# Patient Record
Sex: FEMALE | Race: WHITE | Employment: UNEMPLOYED | URBAN - METROPOLITAN AREA
[De-identification: names, ages, dates, MRNs, and addresses within clinical notes are randomized per-mention and may not be internally consistent; named-entity substitution may affect disease eponyms.]

---

## 2017-09-28 ENCOUNTER — HOSPITAL ENCOUNTER (OUTPATIENT)
Dept: MRI IMAGING | Age: 77
Discharge: HOME | End: 2017-09-28
Attending: ORTHOPAEDIC SURGERY
Payer: MEDICARE

## 2017-09-28 DIAGNOSIS — M41.50 DEGENERATIVE SCOLIOSIS IN ADULT PATIENT: ICD-10-CM

## 2017-09-28 DIAGNOSIS — M54.40 LOW BACK PAIN WITH SCIATICA: ICD-10-CM

## 2017-09-28 PROCEDURE — 72148 MRI LUMBAR SPINE W/O DYE: CPT

## 2017-11-20 NOTE — PERIOP NOTES
[facility]  Ambulatory Surgery Unit  Pre-operative Instructions    Procedure Date  11/21/17            Tentative Arrival Time 2:00pm      1. On the day of your procedure, please report to the Ambulatory Surgery Unit Registration Desk and sign in at your designated time. The Ambulatory Surgery Unit is located in [facility] on the [facility] side of the [facility] across from the [address] [place] [address]. Please have all of your health insurance cards and a photo ID. 2. You must have someone with you to drive you home as directed by your surgeon. 3. You may have a light breakfast and take normal morning medications. 4. We recommend you do not drink any alcoholic beverages for 24 hours before and after your procedure. 5. Contact your surgeons office for instructions on the following medications: non-steroidal anti-inflammatory drugs (i.e. Advil, Aleve), vitamins, and supplements. (Some surgeons will want you to stop these medications prior to surgery and others may allow you to take them)   **If you are currently taking Plavix, Coumadin, Aspirin and/or other blood-thinning agents, contact your surgeon for instructions. ** Your surgeon will partner with the physician prescribing these medications to determine if it is safe to stop or if you need to continue taking. Please do not stop taking these medications without instructions from your surgeon. 6. In an effort to help prevent surgical site infection, we ask that you shower with an anti-bacterial soap (i.e. Dial or Safeguard) on the morning of your procedure. Do not apply any lotions, powders, or deodorants after showering. 7. Wear comfortable clothes. Wear glasses instead of contacts. Do not bring any jewelry or money (other than copays or fees as instructed). Do not wear make-up, particularly mascara, the morning of your procedure. Wear your hair loose or down, no ponytails, buns, pati pins or clips.  All body piercings must be removed. 8. You should understand that if you do not follow these instructions your procedure may be cancelled. If your physical condition changes (i.e. fever, cold or flu) please contact your surgeon as soon as possible. 9. It is important that you be on time. If a situation occurs where you may be late, or if you have any questions or problems, please call [phone].    10. Your procedure time may be subject to change. You will receive a phone call the day prior to confirm your arrival time. I understand a pre-operative phone call will be made to verify my procedure time. In the event that I am not available, I give permission for a message to be left on my answering service and/or with another person?       yes         ___________________      ___________________      ___________________reviewed with patient and daughter, Gaby Tadeo per patient's request.  (Signature of Patient)          (Witness)                   (Date and Time)

## 2017-11-20 NOTE — PERIOP NOTES
Patient gave permission for me to review pre op instructions, directions, arrival time with her daughter, Angela Tiwari. I instructed Marko Giraldo to have patient bring list of medications day of procedure since patient very hard of hearing.

## 2017-11-21 ENCOUNTER — HOSPITAL ENCOUNTER (OUTPATIENT)
Age: 77
Setting detail: OUTPATIENT SURGERY
Discharge: HOME | End: 2017-11-21
Attending: PHYSICAL MEDICINE & REHABILITATION | Admitting: PHYSICAL MEDICINE & REHABILITATION
Payer: MEDICARE

## 2017-11-21 ENCOUNTER — APPOINTMENT (OUTPATIENT)
Dept: GENERAL RADIOLOGY | Age: 77
End: 2017-11-21
Attending: PHYSICAL MEDICINE & REHABILITATION
Payer: MEDICARE

## 2017-11-21 VITALS
RESPIRATION RATE: 16 BRPM | TEMPERATURE: 98.7 F | HEIGHT: 62 IN | WEIGHT: 158.38 LBS | BODY MASS INDEX: 29.15 KG/M2 | OXYGEN SATURATION: 96 % | SYSTOLIC BLOOD PRESSURE: 169 MMHG | HEART RATE: 53 BPM | DIASTOLIC BLOOD PRESSURE: 58 MMHG

## 2017-11-21 PROCEDURE — 74011000250 HC RX REV CODE- 250: Performed by: PHYSICAL MEDICINE & REHABILITATION

## 2017-11-21 PROCEDURE — 72020 X-RAY EXAM OF SPINE 1 VIEW: CPT

## 2017-11-21 PROCEDURE — 76210000046 HC AMBSU PH II REC FIRST 0.5 HR: Performed by: PHYSICAL MEDICINE & REHABILITATION

## 2017-11-21 PROCEDURE — 76030000002 HC AMB SURG OR TIME FIRST 0.: Performed by: PHYSICAL MEDICINE & REHABILITATION

## 2017-11-21 PROCEDURE — 74011250636 HC RX REV CODE- 250/636: Performed by: PHYSICAL MEDICINE & REHABILITATION

## 2017-11-21 PROCEDURE — 77030003665 HC NDL SPN BBMI -A: Performed by: PHYSICAL MEDICINE & REHABILITATION

## 2017-11-21 PROCEDURE — 76000 FLUOROSCOPY <1 HR PHYS/QHP: CPT

## 2017-11-21 RX ORDER — METHYLPREDNISOLONE ACETATE 40 MG/ML
40 INJECTION, SUSPENSION INTRA-ARTICULAR; INTRALESIONAL; INTRAMUSCULAR; SOFT TISSUE ONCE
Status: COMPLETED | OUTPATIENT
Start: 2017-11-21 | End: 2017-11-21

## 2017-11-21 RX ORDER — LIDOCAINE HYDROCHLORIDE 20 MG/ML
5 INJECTION, SOLUTION INFILTRATION; PERINEURAL ONCE
Status: COMPLETED | OUTPATIENT
Start: 2017-11-21 | End: 2017-11-21

## 2017-11-21 RX ORDER — MECLIZINE HCL 12.5 MG 12.5 MG/1
6.25 TABLET ORAL
COMMUNITY

## 2017-11-21 RX ORDER — BUPIVACAINE HYDROCHLORIDE 5 MG/ML
5 INJECTION, SOLUTION EPIDURAL; INTRACAUDAL ONCE
Status: COMPLETED | OUTPATIENT
Start: 2017-11-21 | End: 2017-11-21

## 2017-11-21 RX ORDER — ASCORBIC ACID 500 MG
500 TABLET ORAL DAILY
COMMUNITY

## 2017-11-21 RX ORDER — CARVEDILOL 12.5 MG/1
12.5 TABLET ORAL 2 TIMES DAILY WITH MEALS
COMMUNITY
End: 2020-08-13 | Stop reason: DRUGHIGH

## 2017-11-21 RX ORDER — AMLODIPINE BESYLATE 2.5 MG/1
2.5 TABLET ORAL DAILY
Status: ON HOLD | COMMUNITY
End: 2020-10-21 | Stop reason: SDUPTHER

## 2017-11-21 NOTE — OP NOTES
Facet Steroid Injection Operative Report    Indications: This is a 76 y.o. female who presents with LBP. She was positive for LS DJD. The patient was admitted for surgery as conservative measures have failed. Date of Surgery: 11/21/2017    Preoperative Diagnosis: LS DJD    Postoperative Diagnosis: LS DJD    Surgeon(s) and Role:     * Vianney Morrow MD - Primary     Procedure:  Procedure(s):  RIGHT L4-5 AND L5-S1 FACET INJECTION    Procedure in Detail:  After appropriate informed consent was obtained, the patient was taken to the operating suite and placed in the prone position on the operating table on appropriate padding. The LS region was prepped and draped in the usual sterile fashion. Intraoperative fluoroscopy was used to localize the LS spine. The skin was infiltrated with 2% lidocaine. An 22-g needle was advanced into the Right L4-5 and L5-S1 facets under fluoroscopic guidance. Next, 1ml of 0.5% marcaine and 40mg of Depo-Medrol were injected. The needle was removed from the patient. The patient was then turned back into the supine position on the stretcher and was taken to the Recovery Room in stable condition.     Estimated Blood Loss:  none     Specimens: None       Drains: None          Complications:  None    Signed By: Vianney Morrow MD                        November 21, 2017

## 2017-11-21 NOTE — PERIOP NOTES
Skin assessment:   WNL       Neuro:  Push/Pull assessment:     LUE Response: equal weak   LLE Response: equal weak   RUE Response: equal weak   RLE Response: equal weak    OB/Gyn assessment:    LMP: post menopause

## 2017-11-21 NOTE — IP AVS SNAPSHOT
[address] [facility] 
[phone] Patient: Kasey Reynoso MRN: [id number] :1940 About your hospitalization You were admitted on:  2017 You last received care in the:  [facility] [place] [facility] You were discharged on:  2017 Why you were hospitalized Your primary diagnosis was:  Not on File Discharge Orders None A check collin indicates which time of day the medication should be taken. My Medications ASK your physician about these medications Instructions Each Dose to Equal  
 Morning Noon Evening Bedtime AMARYL 2 mg tablet Generic drug:  glimepiride Your last dose was: Your next dose is: Take 2 mg by mouth every morning. 2 mg  
    
   
   
   
  
 citalopram 20 mg tablet Commonly known as:  Petar Clements Your last dose was: Your next dose is: Take 20 mg by mouth every evening. 20 mg  
    
   
   
   
  
 fenofibrate nanocrystallized 145 mg tablet Commonly known as:  [employer] Your last dose was: Your next dose is: Take 145 mg by mouth nightly. 145 mg  
    
   
   
   
  
 ferrous sulfate 325 mg (65 mg iron) EC tablet Commonly known as:  IRON Your last dose was: Your next dose is: Take 325 mg by mouth Daily (before breakfast). 325 mg FLEXERIL 5 mg tablet Generic drug:  cyclobenzaprine Your last dose was: Your next dose is: Take 5 mg by mouth three (3) times daily as needed for Muscle Spasm(s). 5 mg  
    
   
   
   
  
 lisinopril 20 mg tablet Commonly known as:  May Burgess Your last dose was: Your next dose is: Take 20 mg by mouth daily. 20 mg  
    
   
   
   
  
 pentoxifylline  mg CR tablet Commonly known as:  TRENTAL Your last dose was: Your next dose is: Take 400 mg by mouth daily. 400 mg  
    
   
   
   
  
 promethazine 6.25 mg/5 mL syrup Commonly known as:  PHENERGAN Your last dose was: Your next dose is: Take 6.25 mg by mouth four (4) times daily as needed for Nausea. 6.25 mg  
    
   
   
   
  
 ROBITUSSIN COUGH & COLD CF PO Your last dose was: Your next dose is: Take  by mouth. simvastatin 20 mg tablet Commonly known as:  ZOCOR Your last dose was: Your next dose is: Take 20 mg by mouth nightly. 20 mg  
    
   
   
   
  
 timolol maleate 0.5 % Drpd ophthalmic solution Your last dose was: Your next dose is:    
   
   
 Administer 1 Drop to both eyes two (2) times a day. 1 Drop  
    
   
   
   
  
 traMADol 50 mg tablet Commonly known as:  ULTRAM  
   
Your last dose was: Your next dose is: Take 50 mg by mouth every six (6) hours as needed for Pain. 50 mg  
    
   
   
   
  
 traZODone 50 mg tablet Commonly known as:  Ant Lipps Your last dose was: Your next dose is: Take 1 Tab by mouth nightly. 50 mg Discharge Instructions Discharge Instructions Lumbar Facet Block/Medial Branch Block You had a lumbar facet injection today. You will probably have some numbness in your lower back area for the next 6-8 hours. The steroids will slowly become effective, reducing your pain, over the next 2 weeks. You should begin feeling better after a few days, but it may take up to 2 weeks to notice the difference. The benefit you get from your injection will last a variable amount of time, depending on the severity of your spine problem.    
If the results you experience are significant, but not lasting a long time, you may be a candidate for a procedure that can be longer lasting (radiofrequency ablation of the nerves innervating the facet joints). ? Pain:  Most people do not have any increase in pain after this injection. However, you might experience some soreness at the site of the injection. If this happens, putting an icepack over the sore area will help. ? Bandage: You have a small bandage covering the site of the injection. You may remove it when you get home. ? Restrictions:  Someone should drive you home after the injection. After that, you have no restrictions. You may resume your normal level of activity. You may take a shower or bath, and you may eat normally. You should continue your current exercised and/or therapy routine. ? Medications:  Continue your current medications as prescribed. If your pain decreases, you may reduce the amount of your pain medicines. If you stopped taking anticoagulants or blood-thinners before the injection, start them tomorrow. If you have diabetes, your blood sugar may be elevated for a few days. Call your primary doctor with any questions. Call Dr. Pancho Castro at [phone] if you experience: ? Fever (101 degrees Fahrenheit or greater) ? Nausea or vomiting 
? Headache unrelieved by your normal pain medicine ? Redness or swelling at the injection site that lasts more than 1 day ? New numbness, tingling, weakness, or pain that you didnt have before the injection If still having pain in 1-2 weeks, call office at [phone] for a follow up appointment. DISCHARGE SUMMARY from Nurse The following personal items collected during your admission are returned to you:  
Dental Appliance:   
Vision:   
Hearing Aid:   
Jewelry:   
Clothing:   
Other Valuables:   
Valuables sent to safe: If you were given prescriptions, please review the written information on prescribed medications.   
 
· You will receive a Post Operative Call from one of the Recovery Room Nurses on the day after your surgery to check on you. It is very important for us to know how you are recovering after your surgery. · You may receive an e-mail or letter in the mail from [facility] regarding your experience with us in the Ambulatory Surgery Unit. Your feedback is valuable to us and we appreciate your participation in the survey. If you have not had your influenza or pneumococcal vaccines, please follow up with your primary care physician. The discharge information has been reviewed with the patient. The patient verbalized understanding. Introducing [facility]! Tawny Fuentes introduces Suso patient portal. Now you can access parts of your medical record, email your doctor's office, and request medication refills online. 1. In your internet browser, go to [email or web address] 2. Click on the First Time User? Click Here link in the Sign In box. You will see the New Member Sign Up page. 3. Enter your Suso Access Code exactly as it appears below. You will not need to use this code after youve completed the sign-up process. If you do not sign up before the expiration date, you must request a new code. · Suso Access Code: [email or web address] Expires: 12/27/2017 11:58 AM 
 
4. Enter the last four digits of your Social Security Number (xxxx) and Date of Birth (mm/dd/yyyy) as indicated and click Submit. You will be taken to the next sign-up page. 5. Create a Suso ID. This will be your Suso login ID and cannot be changed, so think of one that is secure and easy to remember. 6. Create a Suso password. You can change your password at any time. 7. Enter your Password Reset Question and Answer. This can be used at a later time if you forget your password. 8. Enter your e-mail address. You will receive e-mail notification when new information is available in [address] 9. Click Sign Up. You can now view and download portions of your medical record. 10. Click the Download Summary menu link to download a portable copy of your medical information. If you have questions, please visit the Frequently Asked Questions section of the [employer]Tweet website. Remember, [employer] is NOT to be used for urgent needs. For medical emergencies, dial 911. Now available from your iPhone and Android! Providers Seen During Your Hospitalization Provider Specialty Primary office phone Allyson Bermgan MD Physical Medicine and Rehab [phone] Your Primary Care Physician (PCP) Primary Care Physician Office Phone Office Fax Vitaly Trimble [address] [phone] You are allergic to the following Allergen Reactions Aspirin Hives Codeine Hives And N&V Recent Documentation Height Weight BMI OB Status Smoking Status 1.575 m 71.8 kg 28.97 kg/m2 Postmenopausal Never Smoker Emergency Contacts Name Discharge Info Relation Home Work Mobile Mayte Sheehan DISCHARGE CAREGIVER [3] Other Relative [6] [phone] Danni Camargo DISCHARGE CAREGIVER [3] Child [2]   [phone] Patient Belongings The following personal items are in your possession at time of discharge: 
                             
 
  
  
 Please provide this summary of care documentation to your next provider. Signatures-by signing, you are acknowledging that this After Visit Summary has been reviewed with you and you have received a copy. Patient Signature:  ____________________________________________________________ Date:  ____________________________________________________________  
  
Rina Alex Provider Signature:  ____________________________________________________________ Date:  ____________________________________________________________

## 2017-11-21 NOTE — H&P
Procedural Case Note    11/21/2017    (3:31 PM)    Mateo Kraft    1940   (76 y.o.)    [id number]    CC:  pain    ROS:   Complete ROS obtained, no CP, no SOB, no N or V    PMH:     Past Medical History:   Diagnosis Date    Chronic pain     hip, back    Diabetes ([address]     GERD (gastroesophageal reflux disease)     Hemorrhagic gastritis     10/2013    [place] (hard of hearing[place]     no hearing aides    Hyperlipemia     Hypertension     Psychiatric disorder     Depression    Stroke [facility]) 1990's    No residual says patient       ALLERGIES:     Allergies   Allergen Reactions    Aspirin Hives    Codeine Hives     And N&V       MEDS:     Current Facility-Administered Medications   Medication Dose Route Frequency    bupivacaine (PF) (MARCAINE) 0.5 % (5 mg/mL) injection 25 mg  5 mL Epidural ONCE    methylPREDNISolone acetate (DEPO-MEDROL) 40 mg/mL injection 40 mg  40 mg IntraMUSCular ONCE    lidocaine (XYLOCAINE) 20 mg/mL (2 %) injection 100 mg  5 mL IntraDERMal ONCE    iohexol (OMNIPAQUE) 180 mg iodine/mL injection 10 mL  10 mL Intra artICUlar ONCE    sodium bicarbonate (4%) (NEUT) injection 1 mL  1 mL SubCUTAneous ONCE          Visit Vitals    /53 (BP 1 Location: Right arm, BP Patient Position: At rest)    Pulse (!) 58    Temp 98.3 °F (36.8 °C)    Resp 20    Ht 5' 2\" (1.575 m)    Wt 71.8 kg (158 lb 6 oz)    SpO2 96%    BMI 28.97 kg/m2     PE:  Gen: NAD  Head: normocephalic  Heart: RRR  Lungs: CTA mana  Abd: NT, ND, soft  Neuro: awake and alert  Skin: intact    IMPRESSION:   LS DJD    Note:  The clinical status was discussed in detail with the patient. The procedure was again discussed and described in detail. All understand and accept the planned procedure and risks; reject other forms of treatment. All questions are answered.     Dave Saab MD

## 2017-11-21 NOTE — PERIOP NOTES
Maninder Padgett  1940  [id number]    Situation:  Verbal report given from: NATALIE Grimes RN  Procedure: Procedure(s):  RIGHT L4-5 AND L5-S1 FACET INJECTION    Background:    Preoperative diagnosis: LUMBAR SPINE DEGENERATIVE JOINT DISEASE    Postoperative diagnosis: LUMBAR SPINE DEGENERATIVE JOINT DISEASE    :  Dr. Shandra White:  Intra-procedure medications, procedure, and allergies reviewed        Recommendation:    Discharge patient home after discharge instructions reviewed with patient. Rest until local has worn off.    1605 Pt. Alert. Denies pain or chill. Discharge instructions reviewed with caregiver and patient. Allowed and answered questions. Tolerating PO fluids. Both state ready for discharge.

## 2017-11-21 NOTE — DISCHARGE INSTRUCTIONS
Discharge Instructions    Lumbar Facet Block/Medial Branch Block    You had a lumbar facet injection today. You will probably have some numbness in your lower back area for the next 6-8 hours. The steroids will slowly become effective, reducing your pain, over the next 2 weeks. You should begin feeling better after a few days, but it may take up to 2 weeks to notice the difference. The benefit you get from your injection will last a variable amount of time, depending on the severity of your spine problem. If the results you experience are significant, but not lasting a long time, you may be a candidate for a procedure that can be longer lasting (radiofrequency ablation of the nerves innervating the facet joints).  Pain:  Most people do not have any increase in pain after this injection. However, you might experience some soreness at the site of the injection. If this happens, putting an icepack over the sore area will help.  Bandage: You have a small bandage covering the site of the injection. You may remove it when you get home.  Restrictions:  Someone should drive you home after the injection. After that, you have no restrictions. You may resume your normal level of activity. You may take a shower or bath, and you may eat normally. You should continue your current exercised and/or therapy routine.  Medications:  Continue your current medications as prescribed. If your pain decreases, you may reduce the amount of your pain medicines. If you stopped taking anticoagulants or blood-thinners before the injection, start them tomorrow. If you have diabetes, your blood sugar may be elevated for a few days. Call your primary doctor with any questions.     Call Dr. Meño Simon at [phone] if you experience:   Fever (101 degrees Fahrenheit or greater)   Nausea or vomiting   Headache unrelieved by your normal pain medicine   Redness or swelling at the injection site that lasts more than 1 day   New numbness, tingling, weakness, or pain that you didnt have before the injection     If still having pain in 1-2 weeks, call office at [phone] for a follow up appointment. DISCHARGE SUMMARY from Nurse    The following personal items collected during your admission are returned to you:   Dental Appliance:    Vision:    Hearing Aid:    Jewelry:    Clothing:    Other Valuables:    Valuables sent to safe: If you were given prescriptions, please review the written information on prescribed medications. · You will receive a Post Operative Call from one of the Recovery Room Nurses on the day after your surgery to check on you. It is very important for us to know how you are recovering after your surgery. · You may receive an e-mail or letter in the mail from [place] regarding your experience with us in the Ambulatory Surgery Unit. Your feedback is valuable to us and we appreciate your participation in the survey. If you have not had your influenza or pneumococcal vaccines, please follow up with your primary care physician. The discharge information has been reviewed with the patient. The patient verbalized understanding.

## 2018-05-09 NOTE — PERIOP NOTES
[facility]  Ambulatory Surgery Unit  Pre-operative Instructions    Procedure Date  5/15/18            Tentative Arrival Time 1:30pm      1. On the day of your procedure, please report to the Ambulatory Surgery Unit Registration Desk and sign in at your designated time. The Ambulatory Surgery Unit is located in [facility] ER on the [facility] side of the [facility] across from the [address] [place] [address]. Please have all of your health insurance cards and a photo ID. 2. You must have someone with you to drive you home as directed by your surgeon. 3. You may have a light breakfast and take normal morning medications. 4. We recommend you do not drink any alcoholic beverages for 24 hours before and after your procedure. 5. Contact your surgeons office for instructions on the following medications: non-steroidal anti-inflammatory drugs (i.e. Advil, Aleve), vitamins, and supplements. (Some surgeons will want you to stop these medications prior to surgery and others may allow you to take them)   **If you are currently taking Plavix, Coumadin, Aspirin and/or other blood-thinning agents, contact your surgeon for instructions. ** Your surgeon will partner with the physician prescribing these medications to determine if it is safe to stop or if you need to continue taking. Please do not stop taking these medications without instructions from your surgeon. 6. In an effort to help prevent surgical site infection, we ask that you shower with an anti-bacterial soap (i.e. Dial or Safeguard) on the morning of your procedure. Do not apply any lotions, powders, or deodorants after showering. 7. Wear comfortable clothes. Wear glasses instead of contacts. Do not bring any jewelry or money (other than copays or fees as instructed). Do not wear make-up, particularly mascara, the morning of your procedure. Wear your hair loose or down, no ponytails, buns, pati pins or clips.  All body piercings must be removed. 8. You should understand that if you do not follow these instructions your procedure may be cancelled. If your physical condition changes (i.e. fever, cold or flu) please contact your surgeon as soon as possible. 9. It is important that you be on time. If a situation occurs where you may be late, or if you have any questions or problems, please call [phone].    10. Your procedure time may be subject to change. You will receive a phone call the day prior to confirm your arrival time. I understand a pre-operative phone call will be made to verify my procedure time. In the event that I am not available, I give permission for a message to be left on my answering service and/or with another person?       yes         ___________________      ___________________      ___________________reviewed with patient's daughter, Cyndee Driver per patient's request.  (Signature of Patient)          (Witness)                   (Date and Time)

## 2018-05-09 NOTE — PERIOP NOTES
Patient is hard of hearing (no hearing aids) and requested daughter, Radha Faust, review her health history since patient could not hear questions I asked.

## 2018-05-15 ENCOUNTER — APPOINTMENT (OUTPATIENT)
Dept: GENERAL RADIOLOGY | Age: 78
End: 2018-05-15
Attending: PHYSICAL MEDICINE & REHABILITATION
Payer: MEDICARE

## 2018-05-15 ENCOUNTER — HOSPITAL ENCOUNTER (OUTPATIENT)
Age: 78
Setting detail: OUTPATIENT SURGERY
Discharge: HOME | End: 2018-05-15
Attending: PHYSICAL MEDICINE & REHABILITATION | Admitting: PHYSICAL MEDICINE & REHABILITATION
Payer: MEDICARE

## 2018-05-15 VITALS
OXYGEN SATURATION: 94 % | SYSTOLIC BLOOD PRESSURE: 174 MMHG | DIASTOLIC BLOOD PRESSURE: 96 MMHG | RESPIRATION RATE: 20 BRPM | HEART RATE: 57 BPM | HEIGHT: 62 IN | TEMPERATURE: 98.5 F | BODY MASS INDEX: 28.52 KG/M2 | WEIGHT: 155 LBS

## 2018-05-15 PROCEDURE — 74011250636 HC RX REV CODE- 250/636: Performed by: PHYSICAL MEDICINE & REHABILITATION

## 2018-05-15 PROCEDURE — 76030000017 HC AMB SURG FIRST 0.5 HR INTENSV-TIER 1: Performed by: PHYSICAL MEDICINE & REHABILITATION

## 2018-05-15 PROCEDURE — 72020 X-RAY EXAM OF SPINE 1 VIEW: CPT

## 2018-05-15 PROCEDURE — 76210000046 HC AMBSU PH II REC FIRST 0.5 HR: Performed by: PHYSICAL MEDICINE & REHABILITATION

## 2018-05-15 PROCEDURE — 77030003665 HC NDL SPN BBMI -A: Performed by: PHYSICAL MEDICINE & REHABILITATION

## 2018-05-15 PROCEDURE — 74011636320 HC RX REV CODE- 636/320: Performed by: PHYSICAL MEDICINE & REHABILITATION

## 2018-05-15 PROCEDURE — 76000 FLUOROSCOPY <1 HR PHYS/QHP: CPT

## 2018-05-15 PROCEDURE — 74011000250 HC RX REV CODE- 250: Performed by: PHYSICAL MEDICINE & REHABILITATION

## 2018-05-15 RX ORDER — BUPIVACAINE HYDROCHLORIDE 5 MG/ML
5 INJECTION, SOLUTION EPIDURAL; INTRACAUDAL ONCE
Status: DISCONTINUED | OUTPATIENT
Start: 2018-05-15 | End: 2018-05-15 | Stop reason: HOSPADM

## 2018-05-15 RX ORDER — METHYLPREDNISOLONE ACETATE 40 MG/ML
40 INJECTION, SUSPENSION INTRA-ARTICULAR; INTRALESIONAL; INTRAMUSCULAR; SOFT TISSUE ONCE
Status: COMPLETED | OUTPATIENT
Start: 2018-05-15 | End: 2018-05-15

## 2018-05-15 RX ORDER — LIDOCAINE HYDROCHLORIDE 20 MG/ML
10 INJECTION, SOLUTION INFILTRATION; PERINEURAL ONCE
Status: COMPLETED | OUTPATIENT
Start: 2018-05-15 | End: 2018-05-15

## 2018-05-15 NOTE — PERIOP NOTES
1620-Pt has strong and equal plantar and dorsi flexion. 1628-Pt able to stand and hold weight. No swelling or bleeding at injection sites. When pt assisted up to wheelchair, pt reports feeling shaky, assisted to wheelchair with 2 assist, pt states her legs don't feel like they did when she came in. Reinforced with pt and daughter pt is to walk with walker today and to be only up with assistance. Transported via w/c to awaiting transportation with minimal assistance. Pt was able to get in car on own.

## 2018-05-15 NOTE — PERIOP NOTES
Bre Renteria  1940  [id number]    Situation:  Verbal report given from: NATALIE Buchanan RN  Procedure: Procedure(s):  RIGHT L4 + LEFT L5 TRANSFORAMINAL EPIDURAL STEROID INJECTION    Background:    Preoperative diagnosis: LUMBAR DEGENERATIVE DISC DISEASE     Postoperative diagnosis: LUMBAR DEGENERATIVE [place] DISEASE     :  Dr. Oralia Lowe:  Intra-procedure medications, procedure, and allergies reviewed        Recommendation:    Discharge patient home after discharge instructions reviewed with patient. Rest until local has worn off.

## 2018-05-15 NOTE — PERIOP NOTES
Skin assessment:   WNL   Skin color: normal for ethnicity   Skin condition: normal for age   Skin integrity: good   Turgor: good    Neuro:  Push/Pull assessment:     LUE Response: moderate    LLE Response: strong push/moderate pull   RUE Response: moderate    RLE Response: strong push/moderate pull

## 2018-05-15 NOTE — OP NOTES
Epidural Steroid Injection Operative Report    Indications: This is a 76 y.o. female who presents with low back pain. She was positive for LS DDD. The patient was admitted for surgery as conservative measures have failed. Date of Surgery: 5/15/2018    Preoperative Diagnosis: LS DDD    Postoperative Diagnosis: LS DDD    Surgeon(s) and Role:     * Silvino Walter MD - Primary     Procedure:  Procedure(s):  RIGHT L4 + LEFT L5 TRANSFORAMINAL EPIDURAL STEROID INJECTION    Procedure in Detail:  After appropriate informed consent was obtained, the patient was taken to the operating suite and placed in the prone position on the operating table on appropriate padding. The LS region was prepped and draped in the usual sterile fashion. Intraoperative fluoroscopy was used to localize the LS spine. The skin was infiltrated with 2% lidocaine. An 22-g needle was advanced into the Right L4 and Left L5 neuroforamen under fluoroscopic guidance. A small amount of contrast was injected into the epidural space, confirming appropriate needle placement on fluoroscopy. Next, 2ml of 2% lidocaine and 80mg of Depo-Medrol were injected. The needle was removed from the patient. The patient was then turned back into the supine position on the stretcher and was taken to the Recovery Room in stable condition.     Estimated Blood Loss:  none     Specimens: None       Drains: None          Complications:  None    Signed By: Silvino Walter MD                        May 15, 2018

## 2018-05-15 NOTE — H&P
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 Visit Vitals    /74 (BP 1 Location: Right arm, BP Patient Position: At rest)    Pulse 62    Temp 98.3 °F (36.8 °C)    Resp 16    Ht 5' 2\" (1.575 m)    Wt 70.3 kg (155 lb)    SpO2 96%    BMI 28.35 kg/m2     PE:  Gen: NAD  Head: normocephalic  Heart: RRR  Lungs: CTA mana  Abd: NT, ND, soft  Neuro: awake and alert  Skin: intact    IMPRESSION:   LS DDD    Note:  The clinical status was discussed in detail with the patient. The procedure was again discussed and described in detail. All understand and accept the planned procedure and risks; reject other forms of treatment. All questions are answered.     Lizbet Walters MD

## 2018-05-15 NOTE — IP AVS SNAPSHOT
[address] [facility] 
[phone] Patient: Shandra Staples MRN: [id number] :1940 About your hospitalization You were admitted on:  May 15, 2018 You last received care in the:  [facility] You were discharged on:  May 15, 2018 Why you were hospitalized Your primary diagnosis was:  Not on File Follow-up Information Follow up With Details Comments Contact Info Dorisann Lundborg, MD   [address] [place] [address] 
[phone] Discharge Orders None A check collin indicates which time of day the medication should be taken. My Medications ASK your doctor about these medications Instructions Each Dose to Equal  
 Morning Noon Evening Bedtime  
 amLODIPine 2.5 mg tablet Commonly known as:  Juliocesar Albright Your last dose was: Your next dose is: Take 2.5 mg by mouth daily. 2.5 mg  
    
   
   
   
  
 carvedilol 12.5 mg tablet Commonly known as:  Jez Lucia Your last dose was: Your next dose is: Take 12.5 mg by mouth two (2) times daily (with meals). 12.5 mg  
    
   
   
   
  
 citalopram 20 mg tablet Commonly known as:  Ayan Hernandez Your last dose was: Your next dose is: Take 20 mg by mouth every evening. 20 mg  
    
   
   
   
  
 fenofibrate nanocrystallized 145 mg tablet Commonly known as:  [employer] Your last dose was: Your next dose is: Take 145 mg by mouth nightly. 145 mg  
    
   
   
   
  
 ferrous sulfate 325 mg (65 mg iron) EC tablet Commonly known as:  IRON Your last dose was: Your next dose is: Take 325 mg by mouth Daily (before breakfast). 325 mg  
    
   
   
   
  
 lisinopril 20 mg tablet Commonly known as:  Hebertnicolasa Zhou Your last dose was: Your next dose is: Take 20 mg by mouth two (2) times a day. 20 mg  
    
   
   
   
  
 meclizine 12.5 mg tablet Commonly known as:  ANTIVERT Your last dose was: Your next dose is: Take 6.25 mg by mouth three (3) times daily as needed (1/2 tab). 6.25 mg  
    
   
   
   
  
 pentoxifylline  mg CR tablet Commonly known as:  TRENTAL Your last dose was: Your next dose is: Take 400 mg by mouth two (2) times a day. 400 mg  
    
   
   
   
  
 simvastatin 20 mg tablet Commonly known as:  ZOCOR Your last dose was: Your next dose is: Take 20 mg by mouth nightly. 20 mg  
    
   
   
   
  
 traMADol 50 mg tablet Commonly known as:  ULTRAM  
   
Your last dose was: Your next dose is: Take 50 mg by mouth every eight (8) hours as needed for Pain. 50 mg  
    
   
   
   
  
 traZODone 50 mg tablet Commonly known as:  Donneta Strayhorn Your last dose was: Your next dose is: Take 1 Tab by mouth nightly. 50 mg  
    
   
   
   
  
 VITAMIN C 500 mg tablet Generic drug:  ascorbic acid (vitamin C) Your last dose was: Your next dose is: Take 500 mg by mouth daily. 500 mg Opioid Education Prescription Opioids: What You Need to Know: 
 
Prescription opioids can be used to help relieve moderate-to-severe pain and are often prescribed following a surgery or injury, or for certain health conditions. These medications can be an important part of treatment but also come with serious risks. Opioids are strong pain medicines. Examples include hydrocodone, oxycodone, fentanyl, and morphine. Heroin is an example of an illegal opioid. It is important to work with your health care provider to make sure you are getting the safest, most effective care. WHAT ARE THE RISKS AND SIDE EFFECTS OF OPIOID USE? Prescription opioids carry serious risks of addiction and overdose, especially with prolonged use. An opioid overdose, often marked by slow breathing, can cause sudden death. The use of prescription opioids can have a number of side effects as well, even when taken as directed. · Tolerance-meaning you might need to take more of a medication for the same pain relief · Physical dependence-meaning you have symptoms of withdrawal when the medication is stopped. Withdrawal symptoms can include nausea, sweating, chills, diarrhea, stomach cramps, and muscle aches. Withdrawal can last up to several weeks, depending on which drug you took and how long you took it. · Increased sensitivity to pain · Constipation · Nausea, vomiting, and dry mouth · Sleepiness and dizziness · Confusion · Depression · Low levels of testosterone that can result in lower sex drive, energy, and strength · Itching and sweating RISKS ARE GREATER WITH:      
· History of drug misuse, substance use disorder, or overdose · Mental health conditions (such as depression or anxiety) · Sleep apnea · Older age (72 years or older) · Pregnancy Avoid alcohol while taking prescription opioids. Also, unless specifically advised by your health care provider, medications to avoid include: · Benzodiazepines (such as Xanax or Valium) · Muscle relaxants (such as Soma or Flexeril) · Hypnotics (such as Ambien or Lunesta) · Other prescription opioids KNOW YOUR OPTIONS Talk to your health care provider about ways to manage your pain that don't involve prescription opioids. Some of these options may actually work better and have fewer risks and side effects. Options may include: 
· Pain relievers such as acetaminophen, ibuprofen, and naproxen · Some medications that are also used for depression or seizures · Physical therapy and exercise · Counseling to help patients learn how to cope better with triggers of pain and stress. · Application of heat or cold compress · Massage therapy · Relaxation techniques Be Informed Make sure you know the name of your medication, how much and how often to take it, and its potential risks & side effects. IF YOU ARE PRESCRIBED OPIOIDS FOR PAIN: 
· Never take opioids in greater amounts or more often than prescribed. Remember the goal is not to be pain-free but to manage your pain at a tolerable level. · Follow up with your primary care provider to: · Work together to create a plan on how to manage your pain. · Talk about ways to help manage your pain that don't involve prescription opioids. · Talk about any and all concerns and side effects. · Help prevent misuse and abuse. · Never sell or share prescription opioids · Help prevent misuse and abuse. · Store prescription opioids in a secure place and out of reach of others (this may include visitors, children, friends, and family). · Safely dispose of unused/unwanted prescription opioids: Find your community drug take-back program or your pharmacy mail-back program, or flush them down the toilet, following guidance from the Food and Drug Administration (www.fda.gov/Drugs/ResourcesForYou). · Visit www.cdc.gov/drugoverdose to learn about the risks of opioid abuse and overdose. · If you believe you may be struggling with addiction, tell your health care provider and ask for guidance or call [address] [place], [place] [address]stdibs at [phone]-WEPX. Discharge Instructions Dr. Cassie Vazquez Discharge Instructions Transforaminal Epidural Steroid Injection/ Selective Nerve Block You had a transforaminal epidural steroid injection/ selective nerve block today. You will probably have some numbness, and possibly weakness, in your leg for the next 6 to 8 hours. The steroids will slowly become effective, reducing your pain, over the next 2 weeks.  You should begin feeling better after a few days, but it may take up to 2 weeks to notice the difference. The benefit you get from your injection will last a variable amount of time, depending on the severity of your lumbar spine problem. ? Pain: Most people do not have any increase in pain after this injection. However, you might experience some soreness in your low back. If this happens, putting an ice pack over the sore area will help. ? Bandage: You will have a small bandage covering the site of the injection. You may remove it once you get home. ? Restrictions: Someone should drive you home after the injection. After that, you have no restrictions. You need to be careful while walking, as you may still have some numbness or weakness in your leg. You may resume your normal level of activity. You may take a shower or bath, and you may eat normally. You should continue your current exercises and/or therapy routine. ?  Medications: Continue your current medications as prescribed. If your pain decreases, you may reduce the amount of your pain medicines. If you stopped taking anticoagulants or blood-thinners before the injection, start them tomorrow. If you have diabetes, your blood sugar may be elevated for a few days. Call your primary doctor with any questions. Call Dr. Malorie Aleman at [phone] if you experience: ? Fever (101 degrees Fahrenheit or greater) ? Nausea or vomiting 
? Headache unrelieved by your normal pain medicine ? Redness or swelling at the injection site that lasts more than 1 day ? New numbness, tingling, weakness, or pain that you didnt have before the injection Follow-up appointment: If still having pain in 1-2 weeks, call office at [phone] for a follow up appointment. DISCHARGE SUMMARY from Nurse The following personal items collected during your admission are returned to you:  
Dental Appliance: Dental Appliances: Lowers, Uppers, With patient Vision:   
Hearing Aid:   
 Jewelry: Jewelry: None Clothing: Clothing: With patient Other Valuables: Other Valuables: Hima Hendricks, With patient Valuables sent to safe: If you were given prescriptions, please review the written information on prescribed medications. · You will receive a Post Operative Call from one of the Recovery Room Nurses on the day after your surgery to check on you. It is very important for us to know how you are recovering after your surgery. · You may receive an e-mail or letter in the mail from [employer] regarding your experience with us in the Ambulatory Surgery Unit. Your feedback is valuable to us and we appreciate your participation in the survey. If you have not had your influenza or pneumococcal vaccines, please follow up with your primary care physician. The discharge information has been reviewed with the patient. The patient verbalized understanding. Introducing [facility]! [employer] introduces [employer] patient portal. Now you can access parts of your medical record, email your doctor's office, and request medication refills online. 1. In your internet browser, go to [email or web address] [facility] 2. Click on the First Time User? Click Here link in the Sign In box. You will see the New Member Sign Up page. 3. Enter your [employer] Access Code exactly as it appears below. You will not need to use this code after youve completed the sign-up process. If you do not sign up before the expiration date, you must request a new code. · [employer] Access Code: [email or web address] Expires: 7/10/2018  1:13 PM 
 
4. Enter the last four digits of your Social Security Number (xxxx) and Date of Birth (mm/dd/yyyy) as indicated and click Submit. You will be taken to the next sign-up page. 5. Create a [employer] ID. This will be your [employer] login ID and cannot be changed, so think of one that is secure and easy to remember. 6. Create a [email or web address] password. You can change your password at any time. 7. Enter your Password Reset Question and Answer. This can be used at a later time if you forget your password. 8. Enter your e-mail address. You will receive e-mail notification when new information is available in [address] 9. Click Sign Up. You can now view and download portions of your medical record. 10. Click the Download Summary menu link to download a portable copy of your medical information. If you have questions, please visit the Frequently Asked Questions section of the [employer] website. Remember, [employer] is NOT to be used for urgent needs. For medical emergencies, dial 911. Now available from your iPhone and Android! Introducing Eric Elliott As a Akil Escobars patient, I wanted to make you aware of our electronic visit tool called Eric Elliott. [employer] allows you to connect within minutes with a medical provider 24 hours a day, seven days a week via a mobile device or tablet or logging into a secure website from your computer. You can access Eric Elliott from anywhere in the [place]. A virtual visit might be right for you when you have a simple condition and feel like you just dont want to get out of bed, or cant get away from work for an appointment, when your regular [facility] provider is not available (evenings, weekends or holidays), or when youre out of town and need minor care. Electronic visits cost only $49 and if the [facility] [employer] provider determines a prescription is needed to treat your condition, one can be electronically transmitted to a nearby pharmacy*. Please take a moment to enroll today if you have not already done so. The enrollment process is free and takes just a few minutes. To enroll, please download the [employer] chaparrita to your tablet or phone, or visit [email or web address] to enroll on your computer. And, as an [address] [place] [address] patient with a [employer] account, the results of your visits will be scanned into your electronic medical record and your primary care provider will be able to view the scanned results. We urge you to continue to see your regular [facility] provider for your ongoing medical care. And while your primary care provider may not be the one available when you seek a Eric Elliott virtual visit, the peace of mind you get from getting a real diagnosis real time can be priceless. For more information on Eric Urbinafin, view our Frequently Asked Questions (FAQs) at [email or web address]. Sincerely, 
 
Abby Amador MD 
Chief Medical Officer Korey Blanca Hernandez *:  certain medications cannot be prescribed via Eric Andresherifin Providers Seen During Your Hospitalization Provider Specialty Primary office phone Brooke Schumacher MD Physical Medicine and Rehab [phone] Your Primary Care Physician (PCP) Primary Care Physician Office Phone Office Fax Martina Goldbergves [place] [address] [facility]. [phone] You are allergic to the following Allergen Reactions Aspirin Hives Codeine Hives And N&V Recent Documentation Height Weight BMI OB Status Smoking Status 1.575 m 70.3 kg 28.35 kg/m2 Postmenopausal Never Smoker Emergency Contacts Name Discharge Info Relation Home Work Mobile CamargoDanni DISCHARGE CAREGIVER [3] Daughter [21] [phone] Mayte Sheehan DISCHARGE CAREGIVER [3] Other Relative [6] [phone] Patient Belongings The following personal items are in your possession at time of discharge: 
  Dental Appliances: Lowers, Uppers, With patient         Home Medications: None   Jewelry: None  Clothing: With patient    Other Valuables: [place] Dirk, With patient Please provide this summary of care documentation to your next provider. Signatures-by signing, you are acknowledging that this After Visit Summary has been reviewed with you and you have received a copy. Patient Signature:  ____________________________________________________________ Date:  ____________________________________________________________  
  
Ranulfo Ala Provider Signature:  ____________________________________________________________ Date:  ____________________________________________________________

## 2018-05-15 NOTE — DISCHARGE INSTRUCTIONS
Dr. Keira Torres Discharge Instructions  Transforaminal Epidural Steroid Injection/ Selective Nerve Block    You had a transforaminal epidural steroid injection/ selective nerve block today. You will probably have some numbness, and possibly weakness, in your leg for the next 6 to 8 hours. The steroids will slowly become effective, reducing your pain, over the next 2 weeks. You should begin feeling better after a few days, but it may take up to 2 weeks to notice the difference. The benefit you get from your injection will last a variable amount of time, depending on the severity of your lumbar spine problem.  Pain: Most people do not have any increase in pain after this injection. However, you might experience some soreness in your low back. If this happens, putting an ice pack over the sore area will help.  Bandage: You will have a small bandage covering the site of the injection. You may remove it once you get home.  Restrictions: Someone should drive you home after the injection. After that, you have no restrictions. You need to be careful while walking, as you may still have some numbness or weakness in your leg. You may resume your normal level of activity. You may take a shower or bath, and you may eat normally. You should continue your current exercises and/or therapy routine.   Medications: Continue your current medications as prescribed. If your pain decreases, you may reduce the amount of your pain medicines. If you stopped taking anticoagulants or blood-thinners before the injection, start them tomorrow. If you have diabetes, your blood sugar may be elevated for a few days. Call your primary doctor with any questions.   Call Dr. Keira Torres at [phone] if you experience:   Fever (101 degrees Fahrenheit or greater)   Nausea or vomiting   Headache unrelieved by your normal pain medicine   Redness or swelling at the injection site that lasts more than 1 day   New numbness, tingling, weakness, or pain that you didnt have before the injection    Follow-up appointment:   If still having pain in 1-2 weeks, call office at [phone] for a follow up appointment. DISCHARGE SUMMARY from Nurse    The following personal items collected during your admission are returned to you:   Dental Appliance: Dental Appliances: Lowers, Uppers, With patient  Vision:    Hearing Aid:    Jewelry: Jewelry: None  Clothing: Clothing: With patient  Other Valuables: Other Valuables: Rula Mims, With patient  Valuables sent to safe: If you were given prescriptions, please review the written information on prescribed medications. · You will receive a Post Operative Call from one of the Recovery Room Nurses on the day after your surgery to check on you. It is very important for us to know how you are recovering after your surgery. · You may receive an e-mail or letter in the mail from [employer] regarding your experience with us in the Ambulatory Surgery Unit. Your feedback is valuable to us and we appreciate your participation in the survey. If you have not had your influenza or pneumococcal vaccines, please follow up with your primary care physician. The discharge information has been reviewed with the patient. The patient verbalized understanding.

## 2020-08-13 RX ORDER — CARVEDILOL 3.12 MG/1
3.12 TABLET ORAL 2 TIMES DAILY WITH MEALS
COMMUNITY

## 2020-08-13 NOTE — PERIOP NOTES
[facility]  Ambulatory Surgery Unit  Pre-operative Instructions    Procedure Date  8/18            Tentative Arrival Time 2:00pm      1. On the day of your procedure, please report to the Ambulatory Surgery Unit Registration Desk and sign in at your designated time. The Ambulatory Surgery Unit is located in [facility] on the [facility] side of the [facility] across from the [address] [place] [address]. Please have all of your health insurance cards and a photo ID. 2. You must have someone with you to drive you home as directed by your surgeon. 3. You may have a light breakfast and take normal morning medications. 4. We recommend you do not drink any alcoholic beverages for 24 hours before and after your procedure. 5. Contact your surgeons office for instructions on the following medications: non-steroidal anti-inflammatory drugs (i.e. Advil, Aleve), vitamins, and supplements. (Some surgeons will want you to stop these medications prior to surgery and others may allow you to take them)   **If you are currently taking Plavix, Coumadin, Aspirin and/or other blood-thinning agents, contact your surgeon for instructions. ** Your surgeon will partner with the physician prescribing these medications to determine if it is safe to stop or if you need to continue taking. Please do not stop taking these medications without instructions from your surgeon. 6. In an effort to help prevent surgical site infection, we ask that you shower with an anti-bacterial soap (i.e. Dial or Safeguard) on the morning of your procedure. Do not apply any lotions, powders, or deodorants after showering. 7. Wear comfortable clothes. Wear glasses instead of contacts. Do not bring any jewelry or money (other than copays or fees as instructed). Do not wear make-up, particularly mascara, the morning of your procedure. Wear your hair loose or down, no ponytails, buns, pati pins or clips.  All body piercings must be removed. 8. You should understand that if you do not follow these instructions your procedure may be cancelled. If your physical condition changes (i.e. fever, cold or flu) please contact your surgeon as soon as possible. 9. It is important that you be on time. If a situation occurs where you may be late, or if you have any questions or problems, please call [phone].    10. Your procedure time may be subject to change. You will receive a phone call the day prior to confirm your arrival time. I understand a pre-operative phone call will be made to verify my procedure time. In the event that I am not available, I give permission for a message to be left on my answering service and/or with another person?       yes      Reviewed by phone with pt, verbalized understanding.   ___________________      ___________________      ___________________  (Signature of Patient)          (Witness)                   (Date and Time)

## 2020-08-18 ENCOUNTER — APPOINTMENT (OUTPATIENT)
Dept: GENERAL RADIOLOGY | Age: 80
End: 2020-08-18
Attending: PHYSICAL MEDICINE & REHABILITATION
Payer: MEDICARE

## 2020-08-18 ENCOUNTER — HOSPITAL ENCOUNTER (OUTPATIENT)
Age: 80
Setting detail: OUTPATIENT SURGERY
Discharge: HOME | End: 2020-08-18
Attending: PHYSICAL MEDICINE & REHABILITATION | Admitting: PHYSICAL MEDICINE & REHABILITATION
Payer: MEDICARE

## 2020-08-18 VITALS
TEMPERATURE: 97.7 F | DIASTOLIC BLOOD PRESSURE: 52 MMHG | SYSTOLIC BLOOD PRESSURE: 149 MMHG | BODY MASS INDEX: 29.44 KG/M2 | HEART RATE: 59 BPM | HEIGHT: 62 IN | RESPIRATION RATE: 16 BRPM | OXYGEN SATURATION: 98 % | WEIGHT: 160 LBS

## 2020-08-18 LAB
GLUCOSE BLD STRIP.AUTO-MCNC: 124 MG/DL (ref 65–100)
SERVICE CMNT-IMP: ABNORMAL

## 2020-08-18 PROCEDURE — 74011000250 HC RX REV CODE- 250: Performed by: PHYSICAL MEDICINE & REHABILITATION

## 2020-08-18 PROCEDURE — 82962 GLUCOSE BLOOD TEST: CPT

## 2020-08-18 PROCEDURE — 72100 X-RAY EXAM L-S SPINE 2/3 VWS: CPT

## 2020-08-18 PROCEDURE — 74011250636 HC RX REV CODE- 250/636: Performed by: PHYSICAL MEDICINE & REHABILITATION

## 2020-08-18 PROCEDURE — 77030003665 HC NDL SPN BBMI -A: Performed by: PHYSICAL MEDICINE & REHABILITATION

## 2020-08-18 PROCEDURE — 76000 FLUOROSCOPY <1 HR PHYS/QHP: CPT

## 2020-08-18 PROCEDURE — 76030000002 HC AMB SURG OR TIME FIRST 0.: Performed by: PHYSICAL MEDICINE & REHABILITATION

## 2020-08-18 PROCEDURE — 76210000046 HC AMBSU PH II REC FIRST 0.5 HR: Performed by: PHYSICAL MEDICINE & REHABILITATION

## 2020-08-18 PROCEDURE — 74011636320 HC RX REV CODE- 636/320: Performed by: PHYSICAL MEDICINE & REHABILITATION

## 2020-08-18 RX ORDER — LIDOCAINE HYDROCHLORIDE 20 MG/ML
7 INJECTION, SOLUTION INFILTRATION; PERINEURAL ONCE
Status: COMPLETED | OUTPATIENT
Start: 2020-08-18 | End: 2020-08-18

## 2020-08-18 RX ORDER — BUPIVACAINE HYDROCHLORIDE 5 MG/ML
5 INJECTION, SOLUTION EPIDURAL; INTRACAUDAL ONCE
Status: DISCONTINUED | OUTPATIENT
Start: 2020-08-18 | End: 2020-08-18 | Stop reason: HOSPADM

## 2020-08-18 RX ORDER — METHYLPREDNISOLONE ACETATE 40 MG/ML
40 INJECTION, SUSPENSION INTRA-ARTICULAR; INTRALESIONAL; INTRAMUSCULAR; SOFT TISSUE ONCE
Status: COMPLETED | OUTPATIENT
Start: 2020-08-18 | End: 2020-08-18

## 2020-08-18 NOTE — DISCHARGE INSTRUCTIONS
Dr. Gurinder Grant Discharge Instructions  Transforaminal Epidural Steroid Injection/ Selective Nerve Block    You had a transforaminal epidural steroid injection/ selective nerve block today. You will probably have some numbness, and possibly weakness, in your leg for the next 6 to 8 hours. The steroids will slowly become effective, reducing your pain, over the next 2 weeks. You should begin feeling better after a few days, but it may take up to 2 weeks to notice the difference. The benefit you get from your injection will last a variable amount of time, depending on the severity of your lumbar spine problem.  Pain: Most people do not have any increase in pain after this injection. However, you might experience some soreness in your low back. If this happens, putting an ice pack over the sore area will help.  Bandage: You will have a small bandage covering the site of the injection. You may remove it once you get home.  Restrictions: Someone should drive you home after the injection. After that, you have no restrictions. You need to be careful while walking, as you may still have some numbness or weakness in your leg. You may resume your normal level of activity. You may take a shower or bath, and you may eat normally. You should continue your current exercises and/or therapy routine.   Medications: Continue your current medications as prescribed. If your pain decreases, you may reduce the amount of your pain medicines. If you stopped taking anticoagulants or blood-thinners before the injection, start them tomorrow. If you have diabetes, your blood sugar may be elevated for a few days. Call your primary doctor with any questions.   Call Dr. Gurinder Grant at [phone] if you experience:   Fever (101 degrees Fahrenheit or greater)   Nausea or vomiting   Headache unrelieved by your normal pain medicine   Redness or swelling at the injection site that lasts more than 1 day   New numbness, tingling, weakness, or pain that you didnt have before the injection    Follow-up appointment:   If still having pain in 1-2 weeks, call office at [phone] for a follow up appointment. DISCHARGE SUMMARY from Nurse    The following personal items collected during your admission are returned to you:   Dental Appliance: Dental Appliances: Lowers, Uppers  Vision: Visual Aid: Glasses  Hearing Aid:    Jewelry:    Clothing:    Other Valuables:    Valuables sent to safe: If you were given prescriptions, please review the written information on prescribed medications. · You will receive a Post Operative Call from one of the Recovery Room Nurses on the day after your surgery to check on you. It is very important for us to know how you are recovering after your surgery. · You may receive an e-mail or letter in the mail from [place] regarding your experience with us in the Ambulatory Surgery Unit. Your feedback is valuable to us and we appreciate your participation in the survey. If you have not had your influenza or pneumococcal vaccines, please follow up with your primary care physician. The discharge information has been reviewed with the patient. The patient verbalized understanding. Patient Education   Learning About Coronavirus [phone])  Coronavirus [phone]): Overview  What is coronavirus (AIAUC-40)? The coronavirus disease (COVID-19) is caused by a virus. It is an illness that was first found in [place], [place], in December 2019. It has since spread worldwide. The virus can cause fever, cough, and trouble breathing. In severe cases, it can cause pneumonia and make it hard to breathe without help. It can cause death. Coronaviruses are a large group of viruses. They cause the common cold. They also cause more serious illnesses like Middle East respiratory syndrome (MERS) and severe acute respiratory syndrome (SARS). COVID-19 is caused by a novel coronavirus.  That means it's a new type that has not been seen in people before. This virus spreads person-to-person through droplets from coughing and sneezing. It can also spread when you are close to someone who is infected. And it can spread when you touch something that has the virus on it, such as a doorknob or a tabletop. What can you do to protect yourself from coronavirus (COVID-19)? The best way to protect yourself from getting sick is to:  · Avoid areas where there is an outbreak. · Avoid contact with people who may be infected. · Wash your hands often with soap or alcohol-based hand sanitizers. · Avoid crowds and try to stay at least 6 feet away from other people. · Wash your hands often, especially after you cough or sneeze. Use soap and water, and scrub for at least 20 seconds. If soap and water aren't available, use an alcohol-based hand . · Avoid touching your mouth, nose, and eyes. What can you do to avoid spreading the virus to others? To help avoid spreading the virus to others:  · Cover your mouth with a tissue when you cough or sneeze. Then throw the tissue in the trash. · Use a disinfectant to clean things that you touch often. · Stay home if you are sick or have been exposed to the virus. Don't go to school, work, or public areas. And don't use public transportation. · If you are sick:  ? Leave your home only if you need to get medical care. But call the doctor's office first so they know you're coming. And wear a face mask, if you have one.  ? If you have a face mask, wear it whenever you're around other people. It can help stop the spread of the virus when you cough or sneeze. ? Clean and disinfect your home every day. Use household  and disinfectant wipes or sprays. Take special care to clean things that you grab with your hands. These include doorknobs, remote controls, phones, and handles on your refrigerator and microwave. And don't forget countertops, tabletops, bathrooms, and computer keyboards.   When to call for help  Call 911 anytime you think you may need emergency care. For example, call if:  · You have severe trouble breathing. (You can't talk at all.)  · You have constant chest pain or pressure. · You are severely dizzy or lightheaded. · You are confused or can't think clearly. · Your face and lips have a blue color. · You pass out (lose consciousness) or are very hard to wake up. Call your doctor now if you develop symptoms such as:  · Shortness of breath. · Fever. · Cough. If you need to get care, call ahead to the doctor's office for instructions before you go. Make sure you wear a face mask, if you have one, to prevent exposing other people to the virus. Where can you get the latest information? The following health organizations are tracking and studying this virus. Their websites contain the most up-to-date information. Juan Francisco Pederson also learn what to do if you think you may have been exposed to the virus. · U.S. Centers for Disease Control and Prevention (CDC): The CDC provides updated news about the disease and travel advice. The website also tells you how to prevent the spread of infection. www.cdc.gov  · World Health Organization [facility]): WHO offers information about the virus outbreaks. WHO also has travel advice. www.who.int  Current as of: April 1, 2020               Content Version: 12.4  © 2740-5278 [employer]wise, Incorporated. Care instructions adapted under license by your healthcare professional. If you have questions about a medical condition or this instruction, always ask your healthcare professional. [address] any warranty or liability for your use of this information.

## 2020-08-18 NOTE — PERIOP NOTES
Neuro:  Push/Pull assessment:     LUE Response:WNL    LLE Response: weak  RUE Response:WNL      RLE Response:weak

## 2020-08-18 NOTE — OP NOTES
Epidural Steroid Injection Operative Report    Indications: This is a 78 y.o. female who presents with low back pain. She was positive for LS DDD. The patient was admitted for surgery as conservative measures have failed. Date of Surgery: 8/18/2020    Preoperative Diagnosis: LS DDD    Postoperative Diagnosis: LS DDD    Surgeon(s) and Role:     * Kirsten Fontanez MD - Primary     Procedure:  Procedure(s):  RIGHT L2 AND L3 TRANSFORAMINAL EPDIURAL  STEROID INJECTION    Procedure in Detail:  After appropriate informed consent was obtained, the patient was taken to the operating suite and placed in the prone position on the operating table on appropriate padding. The LS region was prepped and draped in the usual sterile fashion. Intraoperative fluoroscopy was used to localize the LS spine. The skin was infiltrated with 2% lidocaine. An 22-g needle was advanced into the Right L2 and L3 neuroforamen under fluoroscopic guidance. A small amount of contrast was injected into the epidural space, confirming appropriate needle placement on fluoroscopy. Next, 2ml of 2% lidocaine and 80mg of Depo-Medrol were injected. The needle was removed from the patient. The patient was then turned back into the supine position on the stretcher and was taken to the Recovery Room in stable condition.     Estimated Blood Loss:  none     Specimens: None       Drains: None          Complications:  None    Signed By: Cynthia Angeles MD                        August 18, 2020

## 2020-08-18 NOTE — H&P
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 Visit Vitals  /88 (BP 1 Location: Right arm, BP Patient Position: At rest)   Pulse 63   Temp 98.2 °F (36.8 °C)   Resp 18   Ht 5' 2\" (1.575 m)   Wt 72.6 kg (160 lb)   SpO2 98%   BMI 29.26 kg/m²     PE:  Gen: NAD  Head: normocephalic  Heart: RRR  Lungs: CTA mana  Abd: NT, ND, soft  Neuro: awake and alert  Skin: intact    IMPRESSION:   LS DDD    Note:  The clinical status was discussed in detail with the patient. The procedure was again discussed and described in detail. All understand and accept the planned procedure and risks; reject other forms of treatment. All questions are answered.     Georges Small MD

## 2020-08-18 NOTE — PERIOP NOTES
Martin Thompson  1940  [id number]    Situation:  Verbal report given from: Brandon Segura RN  Procedure: Procedure(s):  RIGHT L2 AND L3 TRANSFORAMINAL EPDIURAL  STEROID INJECTION    Background:    Preoperative diagnosis: DEGENERATIVE DISC DISEASE, LUMBAR    Postoperative diagnosis: [address] DISEASE, LUMBAR    :  Dr. Petar Rosario:  Intra-procedure medications, procedure, and allergies reviewed        Recommendation:    Discharge patient home after discharge instructions reviewed with patient. Rest until local has worn off.

## 2020-08-18 NOTE — PERIOP NOTES
Permission received to review discharge instructions and discuss private health information with dtashley Leyva and will be driving patient home

## 2020-08-18 NOTE — PERIOP NOTES
Pt has strong and equal plantar and dorsi flexion. Pt able to stand and hold weight. No swelling or bleeding at injection sites. Blood sugar by fingerstick is 124. D/c instructions reviewed, all questions answered. Reviewed when to follow up and when to call the doctor. 1544-Pt voided x 1 in bathroom    1558-Transported via w/c to awaiting transportation.

## 2020-08-28 NOTE — PERIOP NOTES
[facility]  Ambulatory Surgery Unit  Pre-operative Instructions    Procedure Date  Tuesday, September 1, 2020            Tentative Arrival Time 1215      1. On the day of your procedure, please report to the Ambulatory Surgery Unit Registration Desk and sign in at your designated time. The Ambulatory Surgery Unit is located in [facility] on the [facility] side of the [facility] across from the [address] [place] [address]. Please have all of your health insurance cards and a photo ID. 2. You must have someone with you to drive you home as directed by your surgeon. 3. You may have a light breakfast and take normal morning medications. 4. We recommend you do not drink any alcoholic beverages for 24 hours before and after your procedure. 5. Contact your surgeons office for instructions on the following medications: non-steroidal anti-inflammatory drugs (i.e. Advil, Aleve), vitamins, and supplements. (Some surgeons will want you to stop these medications prior to surgery and others may allow you to take them)   **If you are currently taking Plavix, Coumadin, Aspirin and/or other blood-thinning agents, contact your surgeon for instructions. ** Your surgeon will partner with the physician prescribing these medications to determine if it is safe to stop or if you need to continue taking. Please do not stop taking these medications without instructions from your surgeon. 6. In an effort to help prevent surgical site infection, we ask that you shower with an anti-bacterial soap (i.e. Dial or Safeguard) on the morning of your procedure. Do not apply any lotions, powders, or deodorants after showering. 7. Wear comfortable clothes. Wear glasses instead of contacts. Do not bring any jewelry or money (other than copays or fees as instructed). Do not wear make-up, particularly mascara, the morning of your procedure. Wear your hair loose or down, no ponytails, buns, pati pins or clips.  All body piercings must be removed. 8. You should understand that if you do not follow these instructions your procedure may be cancelled. If your physical condition changes (i.e. fever, cold or flu) please contact your surgeon as soon as possible. 9. It is important that you be on time. If a situation occurs where you may be late, or if you have any questions or problems, please call [phone].    10. Your procedure time may be subject to change. You will receive a phone call the day prior to confirm your arrival time. I understand a pre-operative phone call will be made to verify my procedure time. In the event that I am not available, I give permission for a message to be left on my answering service and/or with another person?       yes    Preop instructions reviewed  Pt verbalized understanding.      ___________________      ___________________      ___________________  (Signature of Patient)          (Witness)                   (Date and Time)

## 2020-09-01 ENCOUNTER — APPOINTMENT (OUTPATIENT)
Dept: GENERAL RADIOLOGY | Age: 80
End: 2020-09-01
Attending: PHYSICAL MEDICINE & REHABILITATION
Payer: MEDICARE

## 2020-09-01 ENCOUNTER — HOSPITAL ENCOUNTER (OUTPATIENT)
Age: 80
Setting detail: OUTPATIENT SURGERY
Discharge: HOME | End: 2020-09-01
Attending: PHYSICAL MEDICINE & REHABILITATION | Admitting: PHYSICAL MEDICINE & REHABILITATION
Payer: MEDICARE

## 2020-09-01 VITALS
HEART RATE: 64 BPM | HEIGHT: 62 IN | RESPIRATION RATE: 16 BRPM | DIASTOLIC BLOOD PRESSURE: 61 MMHG | BODY MASS INDEX: 29.63 KG/M2 | SYSTOLIC BLOOD PRESSURE: 147 MMHG | OXYGEN SATURATION: 98 % | WEIGHT: 161 LBS | TEMPERATURE: 98.7 F

## 2020-09-01 LAB
GLUCOSE BLD STRIP.AUTO-MCNC: 131 MG/DL (ref 65–100)
SERVICE CMNT-IMP: ABNORMAL

## 2020-09-01 PROCEDURE — 74011250636 HC RX REV CODE- 250/636: Performed by: PHYSICAL MEDICINE & REHABILITATION

## 2020-09-01 PROCEDURE — 74011000250 HC RX REV CODE- 250: Performed by: PHYSICAL MEDICINE & REHABILITATION

## 2020-09-01 PROCEDURE — 76210000046 HC AMBSU PH II REC FIRST 0.5 HR: Performed by: PHYSICAL MEDICINE & REHABILITATION

## 2020-09-01 PROCEDURE — 82962 GLUCOSE BLOOD TEST: CPT

## 2020-09-01 PROCEDURE — 76000 FLUOROSCOPY <1 HR PHYS/QHP: CPT

## 2020-09-01 PROCEDURE — 74011636320 HC RX REV CODE- 636/320: Performed by: PHYSICAL MEDICINE & REHABILITATION

## 2020-09-01 PROCEDURE — 76030000002 HC AMB SURG OR TIME FIRST 0.: Performed by: PHYSICAL MEDICINE & REHABILITATION

## 2020-09-01 PROCEDURE — 72020 X-RAY EXAM OF SPINE 1 VIEW: CPT

## 2020-09-01 RX ORDER — BUPIVACAINE HYDROCHLORIDE 5 MG/ML
5 INJECTION, SOLUTION EPIDURAL; INTRACAUDAL ONCE
Status: DISCONTINUED | OUTPATIENT
Start: 2020-09-01 | End: 2020-09-01 | Stop reason: HOSPADM

## 2020-09-01 RX ORDER — METHYLPREDNISOLONE ACETATE 40 MG/ML
40 INJECTION, SUSPENSION INTRA-ARTICULAR; INTRALESIONAL; INTRAMUSCULAR; SOFT TISSUE ONCE
Status: COMPLETED | OUTPATIENT
Start: 2020-09-01 | End: 2020-09-01

## 2020-09-01 RX ORDER — LIDOCAINE HYDROCHLORIDE 20 MG/ML
7 INJECTION, SOLUTION INFILTRATION; PERINEURAL ONCE
Status: COMPLETED | OUTPATIENT
Start: 2020-09-01 | End: 2020-09-01

## 2020-09-01 NOTE — PERIOP NOTES
Permission received to review discharge instructions and discuss private health information with daughter, Naz Lynn. Hand  are equally strong, bilat. Leg/foot pushes/pulls are equally strong, bilat.

## 2020-09-01 NOTE — DISCHARGE INSTRUCTIONS
Dr. Carissa Porter Discharge Instructions  Transforaminal Epidural Steroid Injection/ Selective Nerve Block    You had a transforaminal epidural steroid injection/ selective nerve block today. You will probably have some numbness, and possibly weakness, in your leg for the next 6 to 8 hours. The steroids will slowly become effective, reducing your pain, over the next 2 weeks. You should begin feeling better after a few days, but it may take up to 2 weeks to notice the difference. The benefit you get from your injection will last a variable amount of time, depending on the severity of your lumbar spine problem.  Pain: Most people do not have any increase in pain after this injection. However, you might experience some soreness in your low back. If this happens, putting an ice pack over the sore area will help.  Bandage: You will have a small bandage covering the site of the injection. You may remove it once you get home.  Restrictions: Someone should drive you home after the injection. After that, you have no restrictions. You need to be careful while walking, as you may still have some numbness or weakness in your leg. You may resume your normal level of activity. You may take a shower or bath, and you may eat normally. You should continue your current exercises and/or therapy routine.   Medications: Continue your current medications as prescribed. If your pain decreases, you may reduce the amount of your pain medicines. If you stopped taking anticoagulants or blood-thinners before the injection, start them tomorrow. If you have diabetes, your blood sugar may be elevated for a few days. Call your primary doctor with any questions.   Call Dr. Carissa Porter at [phone] if you experience:   Fever (101 degrees Fahrenheit or greater)   Nausea or vomiting   Headache unrelieved by your normal pain medicine   Redness or swelling at the injection site that lasts more than 1 day   New numbness, tingling, weakness, or pain that you didnt have before the injection    Follow-up appointment:   If still having pain in 1-2 weeks, call office at [phone] for a follow up appointment. DISCHARGE SUMMARY from Nurse    The following personal items collected during your admission are returned to you:   Dental Appliance: Dental Appliances: Lowers, Uppers, With patient  Vision: Visual Aid: Glasses  Hearing Aid:    Jewelry: Jewelry: Necklace, With patient  Clothing: Clothing: With patient  Other Valuables: Other Valuables: [place](Given to daughter)  Valuables sent to safe: If you were given prescriptions, please review the written information on prescribed medications. · You will receive a Post Operative Call from one of the Recovery Room Nurses on the day after your surgery to check on you. It is very important for us to know how you are recovering after your surgery. · You may receive an e-mail or letter in the mail from [place] regarding your experience with us in the Ambulatory Surgery Unit. Your feedback is valuable to us and we appreciate your participation in the survey. If you have not had your influenza or pneumococcal vaccines, please follow up with your primary care physician. The discharge information has been reviewed with the patient. The patient verbalized understanding.

## 2020-09-01 NOTE — H&P
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    no residual    Stroke (HCC) 1990's    No residual says patient       ALLERGIES:     Allergies   Allergen Reactions    Aspirin Hives    Codeine Hives and Nausea and Vomiting       MEDS:     Current Facility-Administered Medications   Medication Dose Route Frequency    bupivacaine (PF) (MARCAINE) 0.5 % (5 mg/mL) injection 25 mg  5 mL Epidural ONCE    methylPREDNISolone acetate (DEPO-MEDROL) 40 mg/mL injection 40 mg  40 mg IntraMUSCular ONCE    lidocaine (XYLOCAINE) 20 mg/mL (2 %) injection 140 mg  7 mL IntraDERMal ONCE    iohexoL (OMNIPAQUE) 180 mg iodine/mL injection 10 mL  10 mL Intra artICUlar ONCE          Visit Vitals  /56 (BP 1 Location: Right arm, BP Patient Position: At rest)   Pulse 60   Temp 98.4 °F (36.9 °C)   Resp 16   Ht 5' 2\" (1.575 m)   Wt 73 kg (161 lb)   SpO2 99%   BMI 29.45 kg/m²     PE:  Gen: NAD  Head: normocephalic  Heart: RRR  Lungs: CTA mana  Abd: NT, ND, soft  Neuro: awake and alert  Skin: intact    IMPRESSION:   LS DDD    Note:  The clinical status was discussed in detail with the patient. The procedure was again discussed and described in detail. All understand and accept the planned procedure and risks; reject other forms of treatment. All questions are answered.     Kacey Oh MD

## 2020-09-01 NOTE — PERIOP NOTES
Ty Fairmont  1940  [id number]    Situation:  Verbal report given from: DANO Kam RN  Procedure: Procedure(s):  LEFT L4 AND L5 TRANSFORAMINAL EPDIURAL  STEROID INJECTION    Background:    Preoperative diagnosis: DDD, LUMBAR    Postoperative diagnosis: DDD, LUMBAR    :  Dr. Cindy Lo:  Intra-procedure medications, procedure, and allergies reviewed        Recommendation:    Discharge patient home after discharge instructions reviewed with patient. Rest until local has worn off.

## 2020-09-01 NOTE — OP NOTES
Epidural Steroid Injection Operative Report    Indications: This is a 78 y.o. female who presents with low back pain. She was positive for LS DDD. The patient was admitted for surgery as conservative measures have failed. Date of Surgery: 9/1/2020    Preoperative Diagnosis: LS DDD    Postoperative Diagnosis: LS DDD    Surgeon(s) and Role:     * Mariel Martinez MD - Primary     Procedure:  Procedure(s):  LEFT L4 AND L5 TRANSFORAMINAL EPDIURAL  STEROID INJECTION    Procedure in Detail:  After appropriate informed consent was obtained, the patient was taken to the operating suite and placed in the prone position on the operating table on appropriate padding. The LS region was prepped and draped in the usual sterile fashion. Intraoperative fluoroscopy was used to localize the LS spine. The skin was infiltrated with 2% lidocaine. An 22-g needle was advanced into the Left L4 and L5 neuroforamen under fluoroscopic guidance. A small amount of contrast was injected into the epidural space, confirming appropriate needle placement on fluoroscopy. Next, 2ml of 2% lidocaine and 80mg of Depo-Medrol were injected. The needle was removed from the patient. The patient was then turned back into the supine position on the stretcher and was taken to the Recovery Room in stable condition.     Estimated Blood Loss:  none     Specimens: None       Drains: None          Complications:  None    Signed By: Krysta Will MD                        September 1, 2020

## 2020-09-01 NOTE — PERIOP NOTES
Pt has strong and unequal plantar and dorsi flexion, pt is weaker on the L side. Pt able to stand and hold weight. No swelling or bleeding at injection sites. 1355-Pt voided in bathroom with assistance. 1403-Transported via w/c to awaiting transportation. No complaints at time of d/c home.   Blood sugar result is 131

## 2020-09-03 LAB
GLUCOSE BLD STRIP.AUTO-MCNC: 182 MG/DL (ref 65–100)
SERVICE CMNT-IMP: ABNORMAL

## 2020-10-14 ENCOUNTER — APPOINTMENT (OUTPATIENT)
Dept: GENERAL RADIOLOGY | Age: 80
DRG: 517 | End: 2020-10-14
Attending: PHYSICIAN ASSISTANT
Payer: MEDICARE

## 2020-10-14 ENCOUNTER — HOSPITAL ENCOUNTER (INPATIENT)
Age: 80
LOS: 7 days | Discharge: NURSING, REHAB OR LONG-TERM CARE | DRG: 517 | End: 2020-10-21
Attending: STUDENT IN AN ORGANIZED HEALTH CARE EDUCATION/TRAINING PROGRAM | Admitting: INTERNAL MEDICINE
Payer: MEDICARE

## 2020-10-14 ENCOUNTER — APPOINTMENT (OUTPATIENT)
Dept: CT IMAGING | Age: 80
DRG: 517 | End: 2020-10-14
Attending: PHYSICIAN ASSISTANT
Payer: MEDICARE

## 2020-10-14 DIAGNOSIS — S32.019D CLOSED FRACTURE OF FIRST LUMBAR VERTEBRA WITH ROUTINE HEALING, UNSPECIFIED FRACTURE MORPHOLOGY, SUBSEQUENT ENCOUNTER: ICD-10-CM

## 2020-10-14 DIAGNOSIS — S32.010A CLOSED COMPRESSION FRACTURE OF BODY OF L1 VERTEBRA (HCC): Primary | ICD-10-CM

## 2020-10-14 DIAGNOSIS — M54.50 SEVERE LOW BACK PAIN: ICD-10-CM

## 2020-10-14 PROBLEM — S32.009A LUMBAR VERTEBRAL FRACTURE (HCC): Status: ACTIVE | Noted: 2020-10-14

## 2020-10-14 LAB
ABO + RH BLD: NORMAL
ALBUMIN SERPL-MCNC: 2.5 G/DL (ref 3.5–5)
ALBUMIN/GLOB SERPL: 0.6 {RATIO} (ref 1.1–2.2)
ALP SERPL-CCNC: 123 U/L (ref 45–117)
ALT SERPL-CCNC: 19 U/L (ref 12–78)
ANION GAP SERPL CALC-SCNC: 1 MMOL/L (ref 5–15)
AST SERPL-CCNC: 31 U/L (ref 15–37)
BASOPHILS # BLD: 0 K/UL (ref 0–0.1)
BASOPHILS NFR BLD: 1 % (ref 0–1)
BILIRUB SERPL-MCNC: 0.5 MG/DL (ref 0.2–1)
BLOOD GROUP ANTIBODIES SERPL: NORMAL
BUN SERPL-MCNC: 24 MG/DL (ref 6–20)
BUN/CREAT SERPL: 24 (ref 12–20)
CALCIUM SERPL-MCNC: 8.7 MG/DL (ref 8.5–10.1)
CHLORIDE SERPL-SCNC: 108 MMOL/L (ref 97–108)
CO2 SERPL-SCNC: 35 MMOL/L (ref 21–32)
CREAT SERPL-MCNC: 1.02 MG/DL (ref 0.55–1.02)
DIFFERENTIAL METHOD BLD: ABNORMAL
EOSINOPHIL # BLD: 0.2 K/UL (ref 0–0.4)
EOSINOPHIL NFR BLD: 3 % (ref 0–7)
ERYTHROCYTE [DISTWIDTH] IN BLOOD BY AUTOMATED COUNT: 13.6 % (ref 11.5–14.5)
GLOBULIN SER CALC-MCNC: 3.9 G/DL (ref 2–4)
GLUCOSE SERPL-MCNC: 136 MG/DL (ref 65–100)
HCT VFR BLD AUTO: 28.8 % (ref 35–47)
HGB BLD-MCNC: 9 G/DL (ref 11.5–16)
IMM GRANULOCYTES # BLD AUTO: 0 K/UL (ref 0–0.04)
IMM GRANULOCYTES NFR BLD AUTO: 1 % (ref 0–0.5)
LYMPHOCYTES # BLD: 1.1 K/UL (ref 0.8–3.5)
LYMPHOCYTES NFR BLD: 23 % (ref 12–49)
MCH RBC QN AUTO: 30.6 PG (ref 26–34)
MCHC RBC AUTO-ENTMCNC: 31.3 G/DL (ref 30–36.5)
MCV RBC AUTO: 98 FL (ref 80–99)
MONOCYTES # BLD: 0.5 K/UL (ref 0–1)
MONOCYTES NFR BLD: 11 % (ref 5–13)
NEUTS SEG # BLD: 2.9 K/UL (ref 1.8–8)
NEUTS SEG NFR BLD: 61 % (ref 32–75)
NRBC # BLD: 0 K/UL (ref 0–0.01)
NRBC BLD-RTO: 0 PER 100 WBC
PLATELET # BLD AUTO: 118 K/UL (ref 150–400)
PMV BLD AUTO: 10 FL (ref 8.9–12.9)
POTASSIUM SERPL-SCNC: 3.9 MMOL/L (ref 3.5–5.1)
PROT SERPL-MCNC: 6.4 G/DL (ref 6.4–8.2)
RBC # BLD AUTO: 2.94 M/UL (ref 3.8–5.2)
SODIUM SERPL-SCNC: 144 MMOL/L (ref 136–145)
SPECIMEN EXP DATE BLD: NORMAL
WBC # BLD AUTO: 4.8 K/UL (ref 3.6–11)

## 2020-10-14 PROCEDURE — 65270000029 HC RM PRIVATE

## 2020-10-14 PROCEDURE — 72100 X-RAY EXAM L-S SPINE 2/3 VWS: CPT

## 2020-10-14 PROCEDURE — 80053 COMPREHEN METABOLIC PANEL: CPT

## 2020-10-14 PROCEDURE — 99284 EMERGENCY DEPT VISIT MOD MDM: CPT

## 2020-10-14 PROCEDURE — 85025 COMPLETE CBC W/AUTO DIFF WBC: CPT

## 2020-10-14 PROCEDURE — 96375 TX/PRO/DX INJ NEW DRUG ADDON: CPT

## 2020-10-14 PROCEDURE — 96374 THER/PROPH/DIAG INJ IV PUSH: CPT

## 2020-10-14 PROCEDURE — 93005 ELECTROCARDIOGRAM TRACING: CPT

## 2020-10-14 PROCEDURE — 86900 BLOOD TYPING SEROLOGIC ABO: CPT

## 2020-10-14 PROCEDURE — 74011250636 HC RX REV CODE- 250/636: Performed by: PHYSICIAN ASSISTANT

## 2020-10-14 PROCEDURE — 72131 CT LUMBAR SPINE W/O DYE: CPT

## 2020-10-14 PROCEDURE — 36415 COLL VENOUS BLD VENIPUNCTURE: CPT

## 2020-10-14 RX ORDER — MORPHINE SULFATE 10 MG/ML
4 INJECTION, SOLUTION INTRAMUSCULAR; INTRAVENOUS
Status: COMPLETED | OUTPATIENT
Start: 2020-10-14 | End: 2020-10-14

## 2020-10-14 RX ORDER — FENTANYL CITRATE 50 UG/ML
50 INJECTION, SOLUTION INTRAMUSCULAR; INTRAVENOUS
Status: COMPLETED | OUTPATIENT
Start: 2020-10-14 | End: 2020-10-14

## 2020-10-14 RX ORDER — SODIUM CHLORIDE 0.9 % (FLUSH) 0.9 %
5-40 SYRINGE (ML) INJECTION EVERY 8 HOURS
Status: DISCONTINUED | OUTPATIENT
Start: 2020-10-14 | End: 2020-10-21 | Stop reason: HOSPADM

## 2020-10-14 RX ORDER — DIPHENHYDRAMINE HYDROCHLORIDE 50 MG/ML
25 INJECTION, SOLUTION INTRAMUSCULAR; INTRAVENOUS
Status: COMPLETED | OUTPATIENT
Start: 2020-10-14 | End: 2020-10-14

## 2020-10-14 RX ORDER — POLYETHYLENE GLYCOL 3350 17 G/17G
17 POWDER, FOR SOLUTION ORAL DAILY PRN
Status: DISCONTINUED | OUTPATIENT
Start: 2020-10-14 | End: 2020-10-21 | Stop reason: HOSPADM

## 2020-10-14 RX ORDER — ACETAMINOPHEN 650 MG/1
650 SUPPOSITORY RECTAL
Status: DISCONTINUED | OUTPATIENT
Start: 2020-10-14 | End: 2020-10-21 | Stop reason: HOSPADM

## 2020-10-14 RX ORDER — ONDANSETRON 2 MG/ML
4 INJECTION INTRAMUSCULAR; INTRAVENOUS
Status: DISCONTINUED | OUTPATIENT
Start: 2020-10-14 | End: 2020-10-21 | Stop reason: HOSPADM

## 2020-10-14 RX ORDER — MORPHINE SULFATE 2 MG/ML
1 INJECTION, SOLUTION INTRAMUSCULAR; INTRAVENOUS
Status: DISCONTINUED | OUTPATIENT
Start: 2020-10-14 | End: 2020-10-17

## 2020-10-14 RX ORDER — ONDANSETRON 2 MG/ML
4 INJECTION INTRAMUSCULAR; INTRAVENOUS
Status: COMPLETED | OUTPATIENT
Start: 2020-10-14 | End: 2020-10-14

## 2020-10-14 RX ORDER — ENOXAPARIN SODIUM 100 MG/ML
40 INJECTION SUBCUTANEOUS DAILY
Status: DISCONTINUED | OUTPATIENT
Start: 2020-10-15 | End: 2020-10-21 | Stop reason: HOSPADM

## 2020-10-14 RX ORDER — SODIUM CHLORIDE 9 MG/ML
25 INJECTION, SOLUTION INTRAVENOUS CONTINUOUS
Status: DISCONTINUED | OUTPATIENT
Start: 2020-10-14 | End: 2020-10-20

## 2020-10-14 RX ORDER — NALOXONE HYDROCHLORIDE 0.4 MG/ML
0.4 INJECTION, SOLUTION INTRAMUSCULAR; INTRAVENOUS; SUBCUTANEOUS
Status: DISCONTINUED | OUTPATIENT
Start: 2020-10-14 | End: 2020-10-21 | Stop reason: HOSPADM

## 2020-10-14 RX ORDER — SODIUM CHLORIDE 0.9 % (FLUSH) 0.9 %
5-40 SYRINGE (ML) INJECTION AS NEEDED
Status: DISCONTINUED | OUTPATIENT
Start: 2020-10-14 | End: 2020-10-21 | Stop reason: HOSPADM

## 2020-10-14 RX ORDER — ACETAMINOPHEN 325 MG/1
650 TABLET ORAL
Status: DISCONTINUED | OUTPATIENT
Start: 2020-10-14 | End: 2020-10-21 | Stop reason: HOSPADM

## 2020-10-14 RX ORDER — PROMETHAZINE HYDROCHLORIDE 25 MG/1
12.5 TABLET ORAL
Status: DISCONTINUED | OUTPATIENT
Start: 2020-10-14 | End: 2020-10-21 | Stop reason: HOSPADM

## 2020-10-14 RX ADMIN — DIPHENHYDRAMINE HYDROCHLORIDE 25 MG: 50 INJECTION, SOLUTION INTRAMUSCULAR; INTRAVENOUS at 20:27

## 2020-10-14 RX ADMIN — ONDANSETRON 4 MG: 2 INJECTION INTRAMUSCULAR; INTRAVENOUS at 20:27

## 2020-10-14 RX ADMIN — MORPHINE SULFATE 4 MG: 10 INJECTION INTRAVENOUS at 20:28

## 2020-10-14 RX ADMIN — SODIUM CHLORIDE 250 ML: 900 INJECTION, SOLUTION INTRAVENOUS at 20:36

## 2020-10-14 RX ADMIN — FENTANYL CITRATE 50 MCG: 50 INJECTION, SOLUTION INTRAMUSCULAR; INTRAVENOUS at 17:39

## 2020-10-14 NOTE — ED NOTES
1700: Pt placed on external catheter device. Family at bedside. 1815: Pt repositioned in bed for comfort. Pillow placed on R side. 1930: Pt to CT.
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
## 2020-10-15 ENCOUNTER — APPOINTMENT (OUTPATIENT)
Dept: MRI IMAGING | Age: 80
DRG: 517 | End: 2020-10-15
Attending: HOSPITALIST
Payer: MEDICARE

## 2020-10-15 LAB
ANION GAP SERPL CALC-SCNC: 1 MMOL/L (ref 5–15)
APPEARANCE UR: CLEAR
ATRIAL RATE: 58 BPM
BACTERIA URNS QL MICRO: NEGATIVE /HPF
BASOPHILS # BLD: 0 K/UL (ref 0–0.1)
BASOPHILS NFR BLD: 1 % (ref 0–1)
BILIRUB UR QL: NEGATIVE
BUN SERPL-MCNC: 24 MG/DL (ref 6–20)
BUN/CREAT SERPL: 25 (ref 12–20)
CALCIUM SERPL-MCNC: 8.4 MG/DL (ref 8.5–10.1)
CALCULATED P AXIS, ECG09: 61 DEGREES
CALCULATED R AXIS, ECG10: 18 DEGREES
CALCULATED T AXIS, ECG11: 30 DEGREES
CHLORIDE SERPL-SCNC: 110 MMOL/L (ref 97–108)
CO2 SERPL-SCNC: 34 MMOL/L (ref 21–32)
COLOR UR: ABNORMAL
CREAT SERPL-MCNC: 0.97 MG/DL (ref 0.55–1.02)
DIAGNOSIS, 93000: NORMAL
DIFFERENTIAL METHOD BLD: ABNORMAL
EOSINOPHIL # BLD: 0.2 K/UL (ref 0–0.4)
EOSINOPHIL NFR BLD: 4 % (ref 0–7)
EPITH CASTS URNS QL MICRO: ABNORMAL /LPF
ERYTHROCYTE [DISTWIDTH] IN BLOOD BY AUTOMATED COUNT: 13.8 % (ref 11.5–14.5)
GLUCOSE BLD STRIP.AUTO-MCNC: 154 MG/DL (ref 65–100)
GLUCOSE SERPL-MCNC: 120 MG/DL (ref 65–100)
GLUCOSE UR STRIP.AUTO-MCNC: NEGATIVE MG/DL
HCT VFR BLD AUTO: 27.2 % (ref 35–47)
HGB BLD-MCNC: 8.3 G/DL (ref 11.5–16)
HGB UR QL STRIP: NEGATIVE
HYALINE CASTS URNS QL MICRO: ABNORMAL /LPF (ref 0–5)
IMM GRANULOCYTES # BLD AUTO: 0 K/UL (ref 0–0.04)
IMM GRANULOCYTES NFR BLD AUTO: 1 % (ref 0–0.5)
KETONES UR QL STRIP.AUTO: NEGATIVE MG/DL
LEUKOCYTE ESTERASE UR QL STRIP.AUTO: NEGATIVE
LYMPHOCYTES # BLD: 0.9 K/UL (ref 0.8–3.5)
LYMPHOCYTES NFR BLD: 21 % (ref 12–49)
MCH RBC QN AUTO: 30.6 PG (ref 26–34)
MCHC RBC AUTO-ENTMCNC: 30.5 G/DL (ref 30–36.5)
MCV RBC AUTO: 100.4 FL (ref 80–99)
MONOCYTES # BLD: 0.4 K/UL (ref 0–1)
MONOCYTES NFR BLD: 10 % (ref 5–13)
NEUTS SEG # BLD: 2.7 K/UL (ref 1.8–8)
NEUTS SEG NFR BLD: 63 % (ref 32–75)
NITRITE UR QL STRIP.AUTO: NEGATIVE
NRBC # BLD: 0 K/UL (ref 0–0.01)
NRBC BLD-RTO: 0 PER 100 WBC
P-R INTERVAL, ECG05: 186 MS
PH UR STRIP: 7.5 [PH] (ref 5–8)
PLATELET # BLD AUTO: 109 K/UL (ref 150–400)
PMV BLD AUTO: 10.8 FL (ref 8.9–12.9)
POTASSIUM SERPL-SCNC: 3.9 MMOL/L (ref 3.5–5.1)
PROT UR STRIP-MCNC: ABNORMAL MG/DL
Q-T INTERVAL, ECG07: 466 MS
QRS DURATION, ECG06: 86 MS
QTC CALCULATION (BEZET), ECG08: 457 MS
RBC # BLD AUTO: 2.71 M/UL (ref 3.8–5.2)
RBC #/AREA URNS HPF: ABNORMAL /HPF (ref 0–5)
SERVICE CMNT-IMP: ABNORMAL
SODIUM SERPL-SCNC: 145 MMOL/L (ref 136–145)
SP GR UR REFRACTOMETRY: 1.02 (ref 1–1.03)
UROBILINOGEN UR QL STRIP.AUTO: 1 EU/DL (ref 0.2–1)
VENTRICULAR RATE, ECG03: 58 BPM
WBC # BLD AUTO: 4.3 K/UL (ref 3.6–11)
WBC URNS QL MICRO: ABNORMAL /HPF (ref 0–4)

## 2020-10-15 PROCEDURE — 80048 BASIC METABOLIC PNL TOTAL CA: CPT

## 2020-10-15 PROCEDURE — 85025 COMPLETE CBC W/AUTO DIFF WBC: CPT

## 2020-10-15 PROCEDURE — 36415 COLL VENOUS BLD VENIPUNCTURE: CPT

## 2020-10-15 PROCEDURE — 74011250637 HC RX REV CODE- 250/637: Performed by: INTERNAL MEDICINE

## 2020-10-15 PROCEDURE — 97530 THERAPEUTIC ACTIVITIES: CPT

## 2020-10-15 PROCEDURE — 74011250637 HC RX REV CODE- 250/637: Performed by: HOSPITALIST

## 2020-10-15 PROCEDURE — 97165 OT EVAL LOW COMPLEX 30 MIN: CPT

## 2020-10-15 PROCEDURE — 81001 URINALYSIS AUTO W/SCOPE: CPT

## 2020-10-15 PROCEDURE — 82962 GLUCOSE BLOOD TEST: CPT

## 2020-10-15 PROCEDURE — 65270000029 HC RM PRIVATE

## 2020-10-15 PROCEDURE — 72148 MRI LUMBAR SPINE W/O DYE: CPT

## 2020-10-15 PROCEDURE — 74011250636 HC RX REV CODE- 250/636: Performed by: INTERNAL MEDICINE

## 2020-10-15 PROCEDURE — 97162 PT EVAL MOD COMPLEX 30 MIN: CPT

## 2020-10-15 RX ORDER — LANOLIN ALCOHOL/MO/W.PET/CERES
325 CREAM (GRAM) TOPICAL
Status: DISCONTINUED | OUTPATIENT
Start: 2020-10-16 | End: 2020-10-21 | Stop reason: HOSPADM

## 2020-10-15 RX ORDER — PENTOXIFYLLINE 400 MG/1
400 TABLET, EXTENDED RELEASE ORAL 2 TIMES DAILY
Status: DISCONTINUED | OUTPATIENT
Start: 2020-10-15 | End: 2020-10-21 | Stop reason: HOSPADM

## 2020-10-15 RX ORDER — AMLODIPINE BESYLATE 2.5 MG/1
2.5 TABLET ORAL DAILY
Status: DISCONTINUED | OUTPATIENT
Start: 2020-10-15 | End: 2020-10-16

## 2020-10-15 RX ORDER — TRAZODONE HYDROCHLORIDE 50 MG/1
25 TABLET ORAL
Status: DISCONTINUED | OUTPATIENT
Start: 2020-10-15 | End: 2020-10-21 | Stop reason: HOSPADM

## 2020-10-15 RX ORDER — MECLIZINE HCL 12.5 MG 12.5 MG/1
6.25 TABLET ORAL
Status: DISCONTINUED | OUTPATIENT
Start: 2020-10-15 | End: 2020-10-21 | Stop reason: HOSPADM

## 2020-10-15 RX ORDER — TRAMADOL HYDROCHLORIDE 50 MG/1
50 TABLET ORAL
Status: DISCONTINUED | OUTPATIENT
Start: 2020-10-15 | End: 2020-10-21 | Stop reason: HOSPADM

## 2020-10-15 RX ORDER — CARVEDILOL 3.12 MG/1
3.12 TABLET ORAL 2 TIMES DAILY WITH MEALS
Status: DISCONTINUED | OUTPATIENT
Start: 2020-10-15 | End: 2020-10-16

## 2020-10-15 RX ORDER — CITALOPRAM 20 MG/1
20 TABLET, FILM COATED ORAL EVERY EVENING
Status: DISCONTINUED | OUTPATIENT
Start: 2020-10-15 | End: 2020-10-21 | Stop reason: HOSPADM

## 2020-10-15 RX ORDER — HYDRALAZINE HYDROCHLORIDE 20 MG/ML
10 INJECTION INTRAMUSCULAR; INTRAVENOUS
Status: DISCONTINUED | OUTPATIENT
Start: 2020-10-15 | End: 2020-10-21 | Stop reason: HOSPADM

## 2020-10-15 RX ORDER — ATORVASTATIN CALCIUM 10 MG/1
10 TABLET, FILM COATED ORAL
Status: DISCONTINUED | OUTPATIENT
Start: 2020-10-15 | End: 2020-10-21 | Stop reason: HOSPADM

## 2020-10-15 RX ORDER — FENOFIBRATE 145 MG/1
145 TABLET, COATED ORAL
Status: DISCONTINUED | OUTPATIENT
Start: 2020-10-15 | End: 2020-10-21 | Stop reason: HOSPADM

## 2020-10-15 RX ORDER — ASCORBIC ACID 500 MG
500 TABLET ORAL DAILY
Status: DISCONTINUED | OUTPATIENT
Start: 2020-10-15 | End: 2020-10-21 | Stop reason: HOSPADM

## 2020-10-15 RX ADMIN — Medication 10 ML: at 13:58

## 2020-10-15 RX ADMIN — MORPHINE SULFATE 1 MG: 2 INJECTION, SOLUTION INTRAMUSCULAR; INTRAVENOUS at 19:05

## 2020-10-15 RX ADMIN — TRAZODONE HYDROCHLORIDE 25 MG: 50 TABLET ORAL at 21:03

## 2020-10-15 RX ADMIN — PENTOXIFYLLINE 400 MG: 400 TABLET, EXTENDED RELEASE ORAL at 19:35

## 2020-10-15 RX ADMIN — OXYCODONE HYDROCHLORIDE AND ACETAMINOPHEN 500 MG: 500 TABLET ORAL at 11:49

## 2020-10-15 RX ADMIN — ENOXAPARIN SODIUM 40 MG: 40 INJECTION SUBCUTANEOUS at 09:51

## 2020-10-15 RX ADMIN — AMLODIPINE BESYLATE 2.5 MG: 2.5 TABLET ORAL at 11:49

## 2020-10-15 RX ADMIN — MORPHINE SULFATE 1 MG: 2 INJECTION, SOLUTION INTRAMUSCULAR; INTRAVENOUS at 12:07

## 2020-10-15 RX ADMIN — FENOFIBRATE 145 MG: 145 TABLET ORAL at 21:03

## 2020-10-15 RX ADMIN — SODIUM CHLORIDE 75 ML/HR: 900 INJECTION, SOLUTION INTRAVENOUS at 14:30

## 2020-10-15 RX ADMIN — CARVEDILOL 3.12 MG: 3.12 TABLET, FILM COATED ORAL at 11:49

## 2020-10-15 RX ADMIN — PENTOXIFYLLINE 400 MG: 400 TABLET, EXTENDED RELEASE ORAL at 13:57

## 2020-10-15 RX ADMIN — CITALOPRAM HYDROBROMIDE 20 MG: 20 TABLET ORAL at 18:33

## 2020-10-15 RX ADMIN — TRAMADOL HYDROCHLORIDE 50 MG: 50 TABLET ORAL at 21:03

## 2020-10-15 RX ADMIN — CARVEDILOL 3.12 MG: 3.12 TABLET, FILM COATED ORAL at 19:27

## 2020-10-15 RX ADMIN — ATORVASTATIN CALCIUM 10 MG: 10 TABLET, FILM COATED ORAL at 21:03

## 2020-10-15 RX ADMIN — SODIUM CHLORIDE 75 ML/HR: 900 INJECTION, SOLUTION INTRAVENOUS at 00:11

## 2020-10-15 NOTE — PROGRESS NOTES
Problem: Self Care Deficits Care Plan (Adult)  Goal: *Acute Goals and Plan of Care (Insert Text)  Description:   FUNCTIONAL STATUS PRIOR TO ADMISSION: Patient was modified independent using a single point cane for functional mobility. HOME SUPPORT: The patient lived with family and had not needed assist up until last week after her back had been hurting. .    Occupational Therapy Goals  Initiated 10/15/2020  1. Patient will perform grooming with supervision/set-up within 7 day(s). 2.  Patient will perform bathing with moderate assistance  within 7 day(s). 3.  Patient will perform lower body dressing with maximal assistance within 7 day(s). 4.  Patient will perform toilet transfers with maximal assistance within 7 day(s). 5.  Patient will perform all aspects of toileting with minimal assistance/contact guard assist within 7 day(s). 6.  Patient will participate in upper extremity therapeutic exercise/activities with minimal assistance/contact guard assist for 5 minutes within 7 day(s). 7.  Patient will utilize energy conservation techniques during functional activities with verbal cues within 7 day(s). Outcome: Not Met   OCCUPATIONAL THERAPY EVALUATION  Patient: Gloria Us (89 y.o. female)  Date: 10/15/2020  Primary Diagnosis: Lumbar vertebral fracture ([facility] [address] [S32.009A]        Precautions:   Fall    ASSESSMENT  Based on the objective data described below, the patient presents with decreased endurance, strength, functional mobility, ADLs and back pain. Pt lives with family and per family pt  Was independent with ADLs and ILS with cane at times, approx one week ago. Pt has been  In bed for the  Past week per family due to pain. She was supine in bed and family in room, orthostatics taken and BP was stable.   She was educated on log rolling and mod assist of 2 for bed mobility, and once she was sitting up on EOb she was stating that she had to lay down and was not able to relax or rate her pain.Pt was able to bring her legs into bed with CGA and rolled in bed to her back with CGA. She was mod to min for sit to supine and left in bed with HoB elevated. Pt has functional range in BUE and her strength is functional.  Tired to educate pt that laying in bed is not good for her back and may cause more pain. Current Level of Function Impacting Discharge (ADLs/self-care): decreased endurance, strength, functional mobility, ADLs and pain in back    Functional Outcome Measure: The patient scored 20/100  on the Barthel outcome measure which is indicative of max for ADLs. Other factors to consider for discharge: pending progress pt will need rehab      Patient will benefit from skilled therapy intervention to address the above noted impairments. PLAN :  Recommendations and Planned Interventions: self care training, functional mobility training, therapeutic exercise, balance training, therapeutic activities, endurance activities, patient education and home safety training    Frequency/Duration: Patient will be followed by occupational therapy 4 times a week to address goals. Recommendation for discharge: (in order for the patient to meet his/her long term goals)  To be determined: pending her progress    This discharge recommendation:  Has been made in collaboration with the attending provider and/or case management    IF patient discharges home will need the following DME: tbd       SUBJECTIVE:   Patient stated I cant! I cant! 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    no residual    Stroke ([address] 1990's    No residual says patient     Past Surgical History:   Procedure Laterality Date    HX  SECTION  1979    HX CHOLECYSTECTOMY      SC COLONOSCOPY FLX DX W/COLLJ SPEC WHEN PFRMD  2013         SC ESOPHAGOGASTRODUODENOSCOPY TRANSORAL DIAGNOSTIC  10/10/2013            Expanded or extensive additional review of patient history:     Home Situation  Home Environment: Private residence  # Steps to Enter: 4  Rails to Enter: Yes  Hand Rails : Left  One/Two Story Residence: One story  Living Alone: No  Support Systems: Child(diane)  Patient Expects to be Discharged to[de-identified] Private residence  Current DME Used/Available at Home: Marry Ryan, straight, Walker, rollator, Walker, rolling, Commode, bedside    Hand dominance: Right    EXAMINATION OF PERFORMANCE DEFICITS:  Cognitive/Behavioral Status:  Neurologic State: Alert  Orientation Level: Oriented to person;Oriented to place;Oriented to situation  Cognition: Follows commands  Perception: Appears intact  Perseveration: Perseverates during conversation  Safety/Judgement: Fall prevention    Skin: in fair health     Edema: none      Hearing: Auditory  Auditory Impairment: Hard of hearing, bilateral, None    Vision/Perceptual:            intact                         Range of Motion:    AROM: Generally decreased, functional  PROM: Generally decreased, functional                      Strength:    Strength: Generally decreased, functional                Coordination:  Coordination: Within functional limits  Fine Motor Skills-Upper: Left Intact; Right Intact    Gross Motor Skills-Upper: Left Intact; Right Intact    Tone & Sensation:    Tone: Normal  Sensation: Intact                      Balance:  Sitting: Impaired; Without support  Sitting - Static: Fair (occasional)  Sitting - Dynamic: Fair (occasional)  Standing: (uanble/ pt refused)    Functional Mobility and Transfers for ADLs:  Bed Mobility:  Rolling: Minimum assistance;Assist x2  Supine to Sit: Minimum assistance; Moderate assistance;Assist x2  Sit to Supine: Minimum assistance;Assist x2  Scooting: Maximum assistance; Moderate assistance;Assist x2(due to pain)    Transfers:  Sit to Stand: (not tested due to pain)    ADL Assessment:  Feeding: Setup    Oral Facial Hygiene/Grooming: Setup    Bathing: Maximum assistance    Upper Body Dressing: Moderate assistance;Minimum assistance    Lower Body Dressing: Maximum assistance    Toileting: Maximum assistance                ADL Intervention and task modifications:  Patient instructed and demonstrated 0/3 back precautions with verabl cues. Cognitive Retraining  Safety/Judgement: Fall prevention  Barthel Index:    Bathin  Bladder: 0  Bowels: 5  Groomin  Dressin  Feedin  Mobility: 0  Stairs: 0  Toilet Use: 0  Transfer (Bed to Chair and Back): 5  Total: 20/100        The Barthel ADL Index: Guidelines  1. The index should be used as a record of what a patient does, not as a record of what a patient could do. 2. The main aim is to establish degree of independence from any help, physical or verbal, however minor and for whatever reason. 3. The need for supervision renders the patient not independent. 4. A patient's performance should be established using the best available evidence. Asking the patient, friends/relatives and nurses are the usual sources, but direct observation and common sense are also important. However direct testing is not needed. 5. Usually the patient's performance over the preceding 24-48 hours is important, but occasionally longer periods will be relevant. 6. Middle categories imply that the patient supplies over 50 per cent of the effort. 7. Use of aids to be independent is allowed. Augusto Allen., Barthel, D.W. (6255). Functional evaluation: the Barthel Index. [facility] (14)2. PATRICK Benoit, Elijah Rodriguez., Melissa Hirsch., Charmaine, [address] ().  Measuring the change indisability after inpatient rehabilitation; comparison of the responsiveness of the Barthel Index and Functional [place] Measure. Journal of Neurology, Neurosurgery, and Psychiatry, 66(4), 739-903. BOBO Deng, ESTELLE Cool, & Peyton Calix M.A. (2004.) Assessment of post-stroke quality of life in cost-effectiveness studies: The usefulness of the Barthel Index and the EuroQoL-5D. Quality of Life Research, 15, 612-25           Occupational Therapy Evaluation Charge Determination   History Examination Decision-Making   MEDIUM Complexity : Expanded review of history including physical, cognitive and psychosocial  history  LOW Complexity : 1-3 performance deficits relating to physical, cognitive , or psychosocial skils that result in activity limitations and / or participation restrictions  LOW Complexity : No comorbidities that affect functional and no verbal or physical assistance needed to complete eval tasks       Based on the above components, the patient evaluation is determined to be of the following complexity level: LOW   Pain Ratin    Activity Tolerance:   Poor  Please refer to the flowsheet for vital signs taken during this treatment. After treatment patient left in no apparent distress:    Supine in bed, Call bell within reach and Caregiver / family present    COMMUNICATION/EDUCATION:   The patients plan of care was discussed with: Physical therapist and Registered nurse. Home safety education was provided and the patient/caregiver indicated understanding. and Patient is unable to participate in goal setting and plan of care. This patients plan of care is appropriate for delegation to [facility].     Thank you for this referral.  Fide Haider OT  Time Calculation: 20 mins

## 2020-10-15 NOTE — H&P
Hospitalist Admission Note    NAME: Artemio Shaffer   :  1940   MRN:  [id number]     Date/Time:  10/14/2020 11:08 PM    Patient PCP: Roseline Bae MD  ______________________________________________________________________  Given the patient's current clinical presentation, I have a high level of concern for decompensation if discharged from the emergency department. Complex decision making was performed, which includes reviewing the patient's available past medical records, laboratory results, and x-ray films. My assessment of this patient's clinical condition and my plan of care is as follows. Assessment / Plan:  L1 partial compression fracture  -Admit patient to Ortho unit  - Pain medication  - Orthopedic consultation    Hypertension  - Continue home antihypertensive medication    Hyperlipidemia-continue statin    Depression  -Continue home medication      Code Status: Full   Surrogate Decision Maker:    DVT Prophylaxis: Lovenox   GI Prophylaxis: not indicated          Subjective:   CHIEF COMPLAINT: back pain     HISTORY OF PRESENT ILLNESS:     78years old female from home with past medical history significant for hypertension, chronic pain, depression, hyperlipidemia presented to the hospital for evaluation of worsening lower back pain started about 1 week ago, patient is stated she was in the car in her way to get a flu shot when the  accidentally hit a bump and she developed after that severe back pain, she was seen at ED 3 days ago and was discharged home without any imaging, lumbar spine x-ray today show L1 compression fracture. We were asked to admit for work up and evaluation of the above problems.      Past Medical History:   Diagnosis Date    Chronic pain     hip, back    Depression     Diabetes (HCC)     GERD (gastroesophageal reflux disease)     Hemorrhagic gastritis     10/2013    [place] (hard of hearing)     no hearing aides    Hyperlipemia     Hypertension     [facility]cerebrum) [facility] [address]     no residual    Stroke ([facility] [address]     No residual says patient        Past Surgical History:   Procedure Laterality Date    HX  SECTION      HX CHOLECYSTECTOMY      VT COLONOSCOPY FLX DX W/COLLJ SPEC WHEN PFRMD  2013         VT ESOPHAGOGASTRODUODENOSCOPY TRANSORAL DIAGNOSTIC  10/10/2013            Social History     Tobacco Use    Smoking status: Never Smoker    Smokeless tobacco: Never Used   Substance Use Topics    Alcohol use: No        Family History   Problem Relation Age of Onset    Other Other         HTN and DM     Allergies   Allergen Reactions    Aspirin Hives    Codeine Hives and Nausea and Vomiting        Prior to Admission medications    Medication Sig Start Date End Date Taking? Authorizing Provider   carvediloL (Coreg) 3.125 mg tablet Take 3.125 mg by mouth two (2) times daily (with meals). Provider, Historical   meclizine (ANTIVERT) 12.5 mg tablet Take 6.25 mg by mouth three (3) times daily as needed. Provider, Historical   ascorbic acid, vitamin C, (VITAMIN C) 500 mg tablet Take 500 mg by mouth daily. Provider, Historical   amLODIPine (NORVASC) 2.5 mg tablet Take 2.5 mg by mouth daily. Provider, Historical   traMADol (ULTRAM) 50 mg tablet Take 50 mg by mouth every eight (8) hours as needed for Pain. Other, MD Lilli   ferrous sulfate (IRON) 325 mg (65 mg iron) EC tablet Take 325 mg by mouth Daily (before breakfast). Provider, Historical   traZODone (DESYREL) 50 mg tablet Take 25 mg by mouth nightly. 13   Patrice Broussard MD   pentoxifylline CR (TRENTAL) 400 mg CR tablet Take 400 mg by mouth two (2) times a day. Provider, Historical   simvastatin (ZOCOR) 20 mg tablet Take 20 mg by mouth nightly. Provider, Historical   fenofibrate nanocrystallized (TRICOR) 145 mg tablet Take 145 mg by mouth nightly.     Provider, Historical   citalopram (CELEXA) 20 mg tablet Take 20 mg by mouth every evening. Provider, Historical       REVIEW OF SYSTEMS:     I am not able to complete the review of systems because: The patient is intubated and sedated    The patient has altered mental status due to his acute medical problems    The patient has baseline aphasia from prior stroke(s)    The patient has baseline dementia and is not reliable historian    The patient is in acute medical distress and unable to provide information           Total of 12 systems reviewed as follows:       POSITIVE= underlined text  Negative = text not underlined  General:  fever, chills, sweats, generalized weakness, weight loss/gain,      loss of appetite   Eyes:    blurred vision, eye pain, loss of vision, double vision  ENT:    rhinorrhea, pharyngitis   Respiratory:   cough, sputum production, SOB, DAO, wheezing, pleuritic pain   Cardiology:   chest pain, palpitations, orthopnea, PND, edema, syncope   Gastrointestinal:  abdominal pain , N/V, diarrhea, dysphagia, constipation, bleeding   Genitourinary:  frequency, urgency, dysuria, hematuria, incontinence   Muskuloskeletal :  arthralgia, myalgia, back pain  Hematology:  easy bruising, nose or gum bleeding, lymphadenopathy   Dermatological: rash, ulceration, pruritis, color change / jaundice  Endocrine:   hot flashes or polydipsia   Neurological:  headache, dizziness, confusion, focal weakness, paresthesia,     Speech difficulties, memory loss, gait difficulty  Psychological: Feelings of anxiety, depression, agitation    Objective:   VITALS:    Visit Vitals  BP (!) 142/68   Pulse (!) 58   Temp 98.5 °F (36.9 °C)   Resp 18   Ht 5' 2\" (1.575 m)   Wt 73 kg (161 lb)   SpO2 96%   BMI 29.45 kg/m²       PHYSICAL EXAM:    General:    Alert, cooperative, no distress, appears stated age.      HEENT: Atraumatic, anicteric sclerae, pink conjunctivae     No oral ulcers, mucosa moist, throat clear, dentition fair  Neck:  Supple, symmetrical,  thyroid: non tender  Lungs:   Clear to auscultation bilaterally. No Wheezing or Rhonchi. No rales. Chest wall:  No tenderness  No Accessory muscle use. Heart:   Regular  rhythm,  No  murmur   No edema  Abdomen:   Soft, non-tender. Not distended. Bowel sounds normal  Extremities: No cyanosis. No clubbing,      Skin turgor normal, Capillary refill normal, Radial dial pulse 2+  Skin:     Not pale. Not Jaundiced  No rashes   Psych:  Good insight. Not depressed. Not anxious or agitated. Neurologic: EOMs intact. No facial asymmetry. No aphasia or slurred speech. Symmetrical strength, Sensation grossly intact. Alert and oriented X 4.     _______________________________________________________________________  Care Plan discussed with:    Comments   Patient y    Family      RN y    Care Manager                    Consultant:      _______________________________________________________________________  Expected  Disposition:   Home with Family y   HH/PT/OT/RN    SNF/LTC    MAURI    ________________________________________________________________________  TOTAL TIME:  61 Minutes    Critical Care Provided     Minutes non procedure based      Comments    y Reviewed previous records   >50% of visit spent in counseling and coordination of care y Discussion with patient and/or family and questions answered       ________________________________________________________________________  Signed: Makayla Rivera MD    Procedures: see electronic medical records for all procedures/Xrays and details which were not copied into this note but were reviewed prior to creation of Plan.     LAB DATA REVIEWED:    Recent Results (from the past 24 hour(s))   METABOLIC PANEL, COMPREHENSIVE    Collection Time: 10/14/20  8:26 PM   Result Value Ref Range    Sodium 144 136 - 145 mmol/L    Potassium 3.9 3.5 - 5.1 mmol/L    Chloride 108 97 - 108 mmol/L    CO2 35 (H) 21 - 32 mmol/L    Anion gap 1 (L) 5 - 15 mmol/L    Glucose 136 (H) 65 - 100 mg/dL    BUN 24 (H) 6 - 20 MG/DL    Creatinine 1.02 0.55 - 1.02 MG/DL    BUN/Creatinine ratio 24 (H) 12 - 20      GFR est AA >60 >60 ml/min/1.73m2    GFR est non-AA 52 (L) >60 ml/min/1.73m2    Calcium 8.7 8.5 - 10.1 MG/DL    Bilirubin, total 0.5 0.2 - 1.0 MG/DL    ALT (SGPT) 19 12 - 78 U/L    AST (SGOT) 31 15 - 37 U/L    Alk. phosphatase 123 (H) 45 - 117 U/L    Protein, total 6.4 6.4 - 8.2 g/dL    Albumin 2.5 (L) 3.5 - 5.0 g/dL    Globulin 3.9 2.0 - 4.0 g/dL    A-G Ratio 0.6 (L) 1.1 - 2.2     CBC WITH AUTOMATED DIFF    Collection Time: 10/14/20  8:26 PM   Result Value Ref Range    WBC 4.8 3.6 - 11.0 K/uL    RBC 2.94 (L) 3.80 - 5.20 M/uL    HGB 9.0 (L) 11.5 - 16.0 g/dL    HCT 28.8 (L) 35.0 - 47.0 %    MCV 98.0 80.0 - 99.0 FL    MCH 30.6 26.0 - 34.0 PG    MCHC 31.3 30.0 - 36.5 g/dL    RDW 13.6 11.5 - 14.5 %    PLATELET 725 (L) 369 - 400 K/uL    MPV 10.0 8.9 - 12.9 FL    NRBC 0.0 0  WBC    ABSOLUTE NRBC 0.00 0.00 - 0.01 K/uL    NEUTROPHILS 61 32 - 75 %    LYMPHOCYTES 23 12 - 49 %    MONOCYTES 11 5 - 13 %    EOSINOPHILS 3 0 - 7 %    BASOPHILS 1 0 - 1 %    IMMATURE GRANULOCYTES 1 (H) 0.0 - 0.5 %    ABS. NEUTROPHILS 2.9 1.8 - 8.0 K/UL    ABS. LYMPHOCYTES 1.1 0.8 - 3.5 K/UL    ABS. MONOCYTES 0.5 0.0 - 1.0 K/UL    ABS. EOSINOPHILS 0.2 0.0 - 0.4 K/UL    ABS. BASOPHILS 0.0 0.0 - 0.1 K/UL    ABS. IMM.  GRANS. 0.0 0.00 - 0.04 K/UL    DF AUTOMATED     TYPE & SCREEN    Collection Time: 10/14/20  8:26 PM   Result Value Ref Range    Crossmatch Expiration 10/17/2020     ABO/Rh(D) O POSITIVE     Antibody screen NEG    EKG, 12 LEAD, INITIAL    Collection Time: 10/14/20  8:43 PM   Result Value Ref Range    Ventricular Rate 58 BPM    Atrial Rate 58 BPM    P-R Interval 186 ms    QRS Duration 86 ms    Q-T Interval 466 ms    QTC Calculation (Bezet) 457 ms    Calculated P Axis 61 degrees    Calculated R Axis 18 degrees    Calculated T Axis 30 degrees    Diagnosis       Sinus bradycardia  When compared with ECG of 25-JUN-2015 23:00,  Non-specific change in ST segment in Inferior leads  Nonspecific T wave abnormality has replaced inverted T waves in Inferior   leads
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
## 2020-10-15 NOTE — PROGRESS NOTES
Bedside shift change report given to Alberto Smith RN (oncoming nurse) by Rosie Tanner RN (offgoing nurse). Report included the following information SBAR, Kardex, ED Summary, Intake/Output, MAR, Accordion and Recent Results.

## 2020-10-15 NOTE — ED NOTES
TRANSFER - OUT REPORT:    Verbal report given to Brandon RN(name) on Sheryl Julian  being transferred to ortho(unit) for routine progression of care       Report consisted of patients Situation, Background, Assessment and   Recommendations(SBAR). Information from the following report(s) SBAR, ED Summary, Procedure Summary and Accordion was reviewed with the receiving nurse. Lines:   Peripheral IV 10/14/20 Right Antecubital (Active)        Opportunity for questions and clarification was provided.       Patient transported with:   [employer] ([email or web address])

## 2020-10-15 NOTE — ROUTINE PROCESS
Attempted to call unit to make pt NPO after midnight and hold lovenox in am.  No answer per nurse. Orders placed.

## 2020-10-15 NOTE — PROGRESS NOTES
Problem: Pressure Injury - Risk of  Goal: *Prevention of pressure injury  Description: Document Zachary Scale and appropriate interventions in the flowsheet. Outcome: Progressing Towards Goal  Note: Pressure Injury Interventions:  Sensory Interventions: Minimize linen layers, Keep linens dry and wrinkle-free         Activity Interventions: Increase time out of bed, PT/OT evaluation    Mobility Interventions: HOB 30 degrees or less, Turn and reposition approx. every two hours(pillow and wedges)    Nutrition Interventions: Document food/fluid/supplement intake    Friction and Shear Interventions: Minimize layers, Lift sheet                Problem: Patient Education: Go to Patient Education Activity  Goal: Patient/Family Education  Outcome: Progressing Towards Goal     Problem: Falls - Risk of  Goal: *Absence of Falls  Description: Document Oscar Fall Risk and appropriate interventions in the flowsheet.   Outcome: Progressing Towards Goal  Note: Fall Risk Interventions:  Mobility Interventions: Patient to call before getting OOB, Communicate number of staff needed for ambulation/transfer         Medication Interventions: Patient to call before getting OOB, Teach patient to arise slowly    Elimination Interventions: Call light in reach, Patient to call for help with toileting needs              Problem: Patient Education: Go to Patient Education Activity  Goal: Patient/Family Education  Outcome: Progressing Towards Goal

## 2020-10-15 NOTE — PROGRESS NOTES
Problem: Mobility Impaired (Adult and Pediatric)  Goal: *Acute Goals and Plan of Care (Insert Text)  Description: FUNCTIONAL STATUS PRIOR TO ADMISSION: Patient was modified independent using a rolling walker for functional mobility. Patient was modified independent for basic and instrumental ADLs. Patient with lack of activity since last Thursday due to severe pain; h/o GLFs with last GLF being in May/June. HOME SUPPORT PRIOR TO ADMISSION: The patient lived with family but did not require assist.    Physical Therapy Goals  Initiated 10/15/2020  1. Patient will move from supine to sit and sit to supine , scoot up and down, and roll side to side in bed with modified independence within 7 day(s). 2.  Patient will transfer from bed to chair and chair to bed with modified independence using the least restrictive device within 7 day(s). 3.  Patient will perform sit to stand with modified independence within 7 day(s). 4.  Patient will ambulate with modified independence for 150 feet with the least restrictive device within 7 day(s). 5.  Patient will ascend/descend 4 stairs with 1 handrail(s) with modified independence within 7 day(s). Outcome: Not Met   PHYSICAL THERAPY EVALUATION  Patient: Kimberly Tipton (89 y.o. female)  Date: 10/15/2020  Primary Diagnosis: Lumbar vertebral fracture ([facility] [S32.009A]        Precautions:  Fall, [facility]    ASSESSMENT  Based on the objective data described below, the patient presents with impaired functional mobility and decreased independence secondary to impaired sitting balance, generalized weakness, decreased AROM, severe back pain, and poor activity tolerance following admission for L1 compression fx. Pt received supine in bed with daughter at bedside and agreeable to PT evaluation. Reviewed back precautions and log roll technique to improve activity tolerance and pain. She currently requires min-mod A x 1-2 for bed mobility.  Needed instruction and cueing to complete log roll technique. Patient only able to tolerate sitting on EOB <1 minute due to severe back pain. Attempted to instruct patient through relaxation and deep breathing techniques to improve pain and activity tolerance, however patient adamant about returning supine in bed. She was placed in modified chair position and left with all needs met and RN and daughter present following session. Provided extensive education on the importance of OOB mobility and the negative impacts of extended bedrest. Discharge recommendations TBD based on progress in acute PT and pain management. If patient returns home, she will require HHPT and 24/7 assistance. Current Level of Function Impacting Discharge (mobility/balance): min/mod A x 2    Functional Outcome Measure: The patient scored 20/100 on the Barthel Index outcome measure which is indicative of 80% impairment. Other factors to consider for discharge: increased fall risk; decline from baseline mobility; pain management     Patient will benefit from skilled therapy intervention to address the above noted impairments. PLAN :  Recommendations and Planned Interventions: bed mobility training, transfer training, gait training, therapeutic exercises, patient and family training/education, and therapeutic activities      Frequency/Duration: Patient will be followed by physical therapy:  5 times a week to address goals. Recommendation for discharge: (in order for the patient to meet his/her long term goals)  To be determined: HHPT and 24/7 assistance vs rehab pending progress in acute PT and pain management    This discharge recommendation:  Has been made in collaboration with the attending provider and/or case management    IF patient discharges home will need the following DME: to be determined (TBD)         SUBJECTIVE:   Patient stated I need to lay back down.     OBJECTIVE DATA SUMMARY:   HISTORY:    Past Medical History:   Diagnosis Date    Chronic pain     hip, back    Depression     Diabetes ([facility] [address] [facility]     GERD (gastroesophageal reflux disease)     Hemorrhagic gastritis     10/2013    [place] (hard of hearing)     no hearing aides    Hyperlipemia     Hypertension     Stroke (cerebrum) ([facility] [address]     no residual    Stroke ([facility] [address]     No residual says patient     Past Surgical History:   Procedure Laterality Date    HX  SECTION      HX CHOLECYSTECTOMY      [place] COLONOSCOPY FLX DX W/COLLJ SPEC WHEN PFRMD  2013         [place] ESOPHAGOGASTRODUODENOSCOPY TRANSORAL DIAGNOSTIC  10/10/2013            Personal factors and/or comorbidities impacting plan of care: h/o compression fxs; h/o CVA; chronic pain; HTN    Home Situation  Home Environment: Private residence  # Steps to Enter: 4  Rails to Enter: Yes  Hand Rails : Left  One/Two Story Residence: One story  Living Alone: No  Support Systems: Child(diane)  Patient Expects to be Discharged to[de-identified] Private residence  Current DME Used/Available at Home: [address] [facility] [address], straight, Walker, rollator, Walker, rolling, Commode, bedside    EXAMINATION/PRESENTATION/DECISION MAKING:   Critical Behavior:  Neurologic State: Alert  Orientation Level: Oriented to person, Oriented to place, Oriented to situation  Cognition: Follows commands  Safety/Judgement: Fall prevention  Hearing: Auditory  Auditory Impairment: Hard of hearing, bilateral, None  Skin:  Intact  Edema: None  Range Of Motion:  AROM: Generally decreased, functional           PROM: Generally decreased, functional           Strength:    Strength: Generally decreased, functional                    Tone & Sensation:   Tone: Normal              Sensation: Intact               Coordination:  Coordination: Within functional limits  Vision:      Functional Mobility:  Bed Mobility:  Rolling: Minimum assistance;Assist x2  Supine to Sit: Minimum assistance; Moderate assistance;Assist x2  Sit to Supine: Minimum assistance;Assist x2  Scooting: Maximum assistance; Moderate assistance;Assist x2(due to pain)  Transfers:  Sit to Stand: (not tested due to pain)                          Balance:   Sitting: Impaired; Without support  Sitting - Static: Fair (occasional)  Sitting - Dynamic: Fair (occasional)  Standing: (uanble/ pt refused)  Ambulation/Gait Training:                        Functional Measure:  Barthel Index:    Bathin  Bladder: 0  Bowels: 5  Groomin  Dressin  Feedin  Mobility: 0  Stairs: 0  Toilet Use: 0  Transfer (Bed to Chair and Back): 5  Total: 20/100       The Barthel ADL Index: Guidelines  1. The index should be used as a record of what a patient does, not as a record of what a patient could do. 2. The main aim is to establish degree of independence from any help, physical or verbal, however minor and for whatever reason. 3. The need for supervision renders the patient not independent. 4. A patient's performance should be established using the best available evidence. Asking the patient, friends/relatives and nurses are the usual sources, but direct observation and common sense are also important. However direct testing is not needed. 5. Usually the patient's performance over the preceding 24-48 hours is important, but occasionally longer periods will be relevant. 6. Middle categories imply that the patient supplies over 50 per cent of the effort. 7. Use of aids to be independent is allowed. Kristie Brunner., Barthel, D.W. ([id number]). Functional evaluation: the Barthel Index. [address] [facility] (14)2. [facility] Hospitalpa[facility] Sotelo solomon PATRICK Quispe, Gilson Vasquez, Katie Gallagher.[facility], [address] [place] [address] (). Measuring the change indisability after inpatient rehabilitation; comparison of the responsiveness of the Barthel Index and Functional [place] Measure. Journal of Neurology, Neurosurgery, and Psychiatry, 66(4), 924-316. Venita Alston, N.J.A, CRISTO Cool.J.M, & Junie Stockton M.A. (2004.) Assessment of post-stroke quality of life in cost-effectiveness studies:  The usefulness of the Barthel Index and the EuroQoL-5D. Quality of Life Research, 15, 123-16           Physical Therapy Evaluation Charge Determination   History Examination Presentation Decision-Making   HIGH Complexity :3+ comorbidities / personal factors will impact the outcome/ POC  HIGH Complexity : 4+ Standardized tests and measures addressing body structure, function, activity limitation and / or participation in recreation  MEDIUM Complexity : Evolving with changing characteristics  Other outcome measures Barthel Index  HIGH       Based on the above components, the patient evaluation is determined to be of the following complexity level: MEDIUM    Pain Ratin/10 pain post-activity    Activity Tolerance:   Poor and requires frequent rest breaks  Please refer to the flowsheet for vital signs taken during this treatment. After treatment patient left in no apparent distress:   Supine in bed, Call bell within reach, Caregiver / family present, and Side rails x 3    COMMUNICATION/EDUCATION:   The patients plan of care was discussed with: Physical therapist, Occupational therapist, Registered nurse, and Case management. Fall prevention education was provided and the patient/caregiver indicated understanding., Patient/family have participated as able in goal setting and plan of care. , and Patient/family agree to work toward stated goals and plan of care.     Thank you for this referral.  Steven Gant, PT, DPT   Time Calculation: 21 mins

## 2020-10-15 NOTE — PROGRESS NOTES
Hospitalist Progress Note    NAME: Александр Bacon   :  1940   MRN:  [id number]     Subjective:   Daily Progress Note: 10/15/2020 9:54 AM    Chief complaint: admitted with back pain  Case d/w staff she is comfortable in bed, sleeping easily arousable. BP elevated but has not gotten peds per staff. Current Facility-Administered Medications   Medication Dose Route Frequency    carvediloL (COREG) tablet 3.125 mg  3.125 mg Oral BID WITH MEALS    meclizine (ANTIVERT) tablet 6.25 mg  6.25 mg Oral TID PRN    atorvastatin (LIPITOR) tablet 10 mg  10 mg Oral QHS    amLODIPine (NORVASC) tablet 2.5 mg  2.5 mg Oral DAILY    . PHARMACY TO SUBSTITUTE PER PROTOCOL (Reordered from: ferrous sulfate (IRON) 325 mg (65 mg iron) EC tablet)    Per Protocol    citalopram (CELEXA) tablet 20 mg  20 mg Oral QPM    traMADoL (ULTRAM) tablet 50 mg  50 mg Oral Q8H PRN    traZODone (DESYREL) tablet 25 mg  25 mg Oral QHS    ascorbic acid (vitamin C) (VITAMIN C) tablet 500 mg  500 mg Oral DAILY    fenofibrate nanocrystallized (TRICOR) tablet 145 mg  145 mg Oral QHS    pentoxifylline CR (TRENTAL) tablet 400 mg  400 mg Oral BID    hydrALAZINE (APRESOLINE) 20 mg/mL injection 10 mg  10 mg IntraVENous Q6H PRN    sodium chloride (NS) flush 5-40 mL  5-40 mL IntraVENous Q8H    sodium chloride (NS) flush 5-40 mL  5-40 mL IntraVENous PRN    acetaminophen (TYLENOL) tablet 650 mg  650 mg Oral Q6H PRN    Or    acetaminophen (TYLENOL) suppository 650 mg  650 mg Rectal Q6H PRN    polyethylene glycol (MIRALAX) packet 17 g  17 g Oral DAILY PRN    promethazine (PHENERGAN) tablet 12.5 mg  12.5 mg Oral Q6H PRN    Or    ondansetron (ZOFRAN) injection 4 mg  4 mg IntraVENous Q6H PRN    enoxaparin (LOVENOX) injection 40 mg  40 mg SubCUTAneous DAILY    morphine injection 1 mg  1 mg IntraVENous Q6H PRN    naloxone (NARCAN) injection 0.4 mg  0.4 mg IntraVENous EVERY 2 MINUTES AS NEEDED    0.9% sodium chloride infusion  75 mL/hr IntraVENous CONTINUOUS          Objective:     Visit Vitals  BP (!) 156/72   Pulse 68   Temp 98.1 °F (36.7 °C)   Resp 16   Ht 5' 2\" (1.575 m)   Wt 73 kg (161 lb)   SpO2 93%   BMI 29.45 kg/m²      O2 Device: Room air    Temp (24hrs), Av.3 °F (36.8 °C), Min:98.1 °F (36.7 °C), Max:98.5 °F (36.9 °C)        Physical Exam:    Gen:   in no acute distress  HEENT:    hearing intact to voice, moist mucous membranes  Neck: Supple  Resp: No accessory muscle use, clear breath sounds without wheezes rales or rhonchi  Card: No murmurs, normal S1, S2 without thrills or peripheral edema  Abd:   Soft, non-tender, non-distended   Musc: No cyanosis or clubbing  Skin: No rashes or ulcers, skin turgor is good  Neuro:   Sleepy but arousable, follows commands appropriately  Psych:   flat affecct      Data Review    Recent Results (from the past 24 hour(s))   METABOLIC PANEL, COMPREHENSIVE    Collection Time: 10/14/20  8:26 PM   Result Value Ref Range    Sodium 144 136 - 145 mmol/L    Potassium 3.9 3.5 - 5.1 mmol/L    Chloride 108 97 - 108 mmol/L    CO2 35 (H) 21 - 32 mmol/L    Anion gap 1 (L) 5 - 15 mmol/L    Glucose 136 (H) 65 - 100 mg/dL    BUN 24 (H) 6 - 20 MG/DL    Creatinine 1.02 0.55 - 1.02 MG/DL    BUN/Creatinine ratio 24 (H) 12 - 20      GFR est AA >60 >60 ml/min/1.73m2    GFR est non-AA 52 (L) >60 ml/min/1.73m2    Calcium 8.7 8.5 - 10.1 MG/DL    Bilirubin, total 0.5 0.2 - 1.0 MG/DL    ALT (SGPT) 19 12 - 78 U/L    AST (SGOT) 31 15 - 37 U/L    Alk.  phosphatase 123 (H) 45 - 117 U/L    Protein, total 6.4 6.4 - 8.2 g/dL    Albumin 2.5 (L) 3.5 - 5.0 g/dL    Globulin 3.9 2.0 - 4.0 g/dL    A-G Ratio 0.6 (L) 1.1 - 2.2     CBC WITH AUTOMATED DIFF    Collection Time: 10/14/20  8:26 PM   Result Value Ref Range    WBC 4.8 3.6 - 11.0 K/uL    RBC 2.94 (L) 3.80 - 5.20 M/uL    HGB 9.0 (L) 11.5 - 16.0 g/dL    HCT 28.8 (L) 35.0 - 47.0 %    MCV 98.0 80.0 - 99.0 FL    MCH 30.6 26.0 - 34.0 PG    MCHC 31.3 30.0 - 36.5 g/dL    RDW 13.6 11.5 - 14.5 % PLATELET 475 (L) 829 - 400 K/uL    MPV 10.0 8.9 - 12.9 FL    NRBC 0.0 0  WBC    ABSOLUTE NRBC 0.00 0.00 - 0.01 K/uL    NEUTROPHILS 61 32 - 75 %    LYMPHOCYTES 23 12 - 49 %    MONOCYTES 11 5 - 13 %    EOSINOPHILS 3 0 - 7 %    BASOPHILS 1 0 - 1 %    IMMATURE GRANULOCYTES 1 (H) 0.0 - 0.5 %    ABS. NEUTROPHILS 2.9 1.8 - 8.0 K/UL    ABS. LYMPHOCYTES 1.1 0.8 - 3.5 K/UL    ABS. MONOCYTES 0.5 0.0 - 1.0 K/UL    ABS. EOSINOPHILS 0.2 0.0 - 0.4 K/UL    ABS. BASOPHILS 0.0 0.0 - 0.1 K/UL    ABS. IMM.  GRANS. 0.0 0.00 - 0.04 K/UL    DF AUTOMATED     TYPE & SCREEN    Collection Time: 10/14/20  8:26 PM   Result Value Ref Range    Crossmatch Expiration 10/17/2020     ABO/Rh(D) O POSITIVE     Antibody screen NEG    EKG, 12 LEAD, INITIAL    Collection Time: 10/14/20  8:43 PM   Result Value Ref Range    Ventricular Rate 58 BPM    Atrial Rate 58 BPM    P-R Interval 186 ms    QRS Duration 86 ms    Q-T Interval 466 ms    QTC Calculation (Bezet) 457 ms    Calculated P Axis 61 degrees    Calculated R Axis 18 degrees    Calculated T Axis 30 degrees    Diagnosis       Normal sinus rhythm  Nonspecific ST and T wave abnormality    Confirmed by Claretha Dubin M.D. ([id number]) on 10/15/2020 2:98:14 AM     METABOLIC PANEL, BASIC    Collection Time: 10/15/20  4:46 AM   Result Value Ref Range    Sodium 145 136 - 145 mmol/L    Potassium 3.9 3.5 - 5.1 mmol/L    Chloride 110 (H) 97 - 108 mmol/L    CO2 34 (H) 21 - 32 mmol/L    Anion gap 1 (L) 5 - 15 mmol/L    Glucose 120 (H) 65 - 100 mg/dL    BUN 24 (H) 6 - 20 MG/DL    Creatinine 0.97 0.55 - 1.02 MG/DL    BUN/Creatinine ratio 25 (H) 12 - 20      GFR est AA >60 >60 ml/min/1.73m2    GFR est non-AA 55 (L) >60 ml/min/1.73m2    Calcium 8.4 (L) 8.5 - 10.1 MG/DL   CBC WITH AUTOMATED DIFF    Collection Time: 10/15/20  4:46 AM   Result Value Ref Range    WBC 4.3 3.6 - 11.0 K/uL    RBC 2.71 (L) 3.80 - 5.20 M/uL    HGB 8.3 (L) 11.5 - 16.0 g/dL    HCT 27.2 (L) 35.0 - 47.0 %    .4 (H) 80.0 - 99.0 FL MCH 30.6 26.0 - 34.0 PG    MCHC 30.5 30.0 - 36.5 g/dL    RDW 13.8 11.5 - 14.5 %    PLATELET 173 (L) 775 - 400 K/uL    MPV 10.8 8.9 - 12.9 FL    NRBC 0.0 0  WBC    ABSOLUTE NRBC 0.00 0.00 - 0.01 K/uL    NEUTROPHILS 63 32 - 75 %    LYMPHOCYTES 21 12 - 49 %    MONOCYTES 10 5 - 13 %    EOSINOPHILS 4 0 - 7 %    BASOPHILS 1 0 - 1 %    IMMATURE GRANULOCYTES 1 (H) 0.0 - 0.5 %    ABS. NEUTROPHILS 2.7 1.8 - 8.0 K/UL    ABS. LYMPHOCYTES 0.9 0.8 - 3.5 K/UL    ABS. MONOCYTES 0.4 0.0 - 1.0 K/UL    ABS. EOSINOPHILS 0.2 0.0 - 0.4 K/UL    ABS. BASOPHILS 0.0 0.0 - 0.1 K/UL    ABS. IMM. GRANS. 0.0 0.00 - 0.04 K/UL    DF AUTOMATED       No results found for this visit on 10/14/20. All Micro Results     None           Radiology reports and films for the last 24 hours have been reviewed.     Assessment/Plan:         L1 partial compression fracture  -  Get MRI L spine  - Pain control, PT/OT eval  - Orthopedic/NSG consultation pending    Chr anemia- Hgb stable monitor periodically    Hypertension- Continue home meds, bp elevated from pain  Added prn hydralazine     Hyperlipidemia-continue statin     Depression-Continue home medication        Code Status: Full   Surrogate Decision Maker:     DVT Prophylaxis: Lovenox   Dispo: TBD    Signed By: Александр Terrazas MD     October 15, 2020

## 2020-10-16 ENCOUNTER — HOSPITAL ENCOUNTER (OUTPATIENT)
Dept: INTERVENTIONAL RADIOLOGY/VASCULAR | Age: 80
Discharge: HOME | End: 2020-10-16
Attending: HOSPITALIST

## 2020-10-16 LAB
ANION GAP SERPL CALC-SCNC: 2 MMOL/L (ref 5–15)
BASOPHILS # BLD: 0 K/UL (ref 0–0.1)
BASOPHILS NFR BLD: 1 % (ref 0–1)
BUN SERPL-MCNC: 22 MG/DL (ref 6–20)
BUN/CREAT SERPL: 26 (ref 12–20)
CALCIUM SERPL-MCNC: 8.2 MG/DL (ref 8.5–10.1)
CHLORIDE SERPL-SCNC: 110 MMOL/L (ref 97–108)
CO2 SERPL-SCNC: 32 MMOL/L (ref 21–32)
CREAT SERPL-MCNC: 0.85 MG/DL (ref 0.55–1.02)
DIFFERENTIAL METHOD BLD: ABNORMAL
EOSINOPHIL # BLD: 0 K/UL (ref 0–0.4)
EOSINOPHIL NFR BLD: 1 % (ref 0–7)
ERYTHROCYTE [DISTWIDTH] IN BLOOD BY AUTOMATED COUNT: 13.7 % (ref 11.5–14.5)
GLUCOSE SERPL-MCNC: 94 MG/DL (ref 65–100)
HCT VFR BLD AUTO: 25.4 % (ref 35–47)
HGB BLD-MCNC: 7.8 G/DL (ref 11.5–16)
IMM GRANULOCYTES # BLD AUTO: 0 K/UL (ref 0–0.04)
IMM GRANULOCYTES NFR BLD AUTO: 0 % (ref 0–0.5)
LYMPHOCYTES # BLD: 0.7 K/UL (ref 0.8–3.5)
LYMPHOCYTES NFR BLD: 16 % (ref 12–49)
MCH RBC QN AUTO: 30.4 PG (ref 26–34)
MCHC RBC AUTO-ENTMCNC: 30.7 G/DL (ref 30–36.5)
MCV RBC AUTO: 98.8 FL (ref 80–99)
MONOCYTES # BLD: 0.2 K/UL (ref 0–1)
MONOCYTES NFR BLD: 5 % (ref 5–13)
NEUTS SEG # BLD: 3.7 K/UL (ref 1.8–8)
NEUTS SEG NFR BLD: 77 % (ref 32–75)
NRBC # BLD: 0 K/UL (ref 0–0.01)
NRBC BLD-RTO: 0 PER 100 WBC
PLATELET # BLD AUTO: 97 K/UL (ref 150–400)
PMV BLD AUTO: 10.4 FL (ref 8.9–12.9)
POTASSIUM SERPL-SCNC: 4 MMOL/L (ref 3.5–5.1)
RBC # BLD AUTO: 2.57 M/UL (ref 3.8–5.2)
RBC MORPH BLD: ABNORMAL
SODIUM SERPL-SCNC: 144 MMOL/L (ref 136–145)
WBC # BLD AUTO: 4.6 K/UL (ref 3.6–11)

## 2020-10-16 PROCEDURE — 74011250637 HC RX REV CODE- 250/637: Performed by: HOSPITALIST

## 2020-10-16 PROCEDURE — 80048 BASIC METABOLIC PNL TOTAL CA: CPT

## 2020-10-16 PROCEDURE — 36415 COLL VENOUS BLD VENIPUNCTURE: CPT

## 2020-10-16 PROCEDURE — 97530 THERAPEUTIC ACTIVITIES: CPT

## 2020-10-16 PROCEDURE — 85025 COMPLETE CBC W/AUTO DIFF WBC: CPT

## 2020-10-16 PROCEDURE — 65270000029 HC RM PRIVATE

## 2020-10-16 PROCEDURE — 74011250637 HC RX REV CODE- 250/637: Performed by: INTERNAL MEDICINE

## 2020-10-16 PROCEDURE — 74011250636 HC RX REV CODE- 250/636: Performed by: INTERNAL MEDICINE

## 2020-10-16 RX ORDER — AMLODIPINE BESYLATE 5 MG/1
5 TABLET ORAL DAILY
Status: DISCONTINUED | OUTPATIENT
Start: 2020-10-17 | End: 2020-10-19

## 2020-10-16 RX ORDER — CARVEDILOL 6.25 MG/1
6.25 TABLET ORAL 2 TIMES DAILY WITH MEALS
Status: DISCONTINUED | OUTPATIENT
Start: 2020-10-16 | End: 2020-10-21 | Stop reason: HOSPADM

## 2020-10-16 RX ADMIN — Medication 10 ML: at 13:11

## 2020-10-16 RX ADMIN — CARVEDILOL 3.12 MG: 3.12 TABLET, FILM COATED ORAL at 11:51

## 2020-10-16 RX ADMIN — OXYCODONE HYDROCHLORIDE AND ACETAMINOPHEN 500 MG: 500 TABLET ORAL at 11:51

## 2020-10-16 RX ADMIN — PENTOXIFYLLINE 400 MG: 400 TABLET, EXTENDED RELEASE ORAL at 11:52

## 2020-10-16 RX ADMIN — TRAZODONE HYDROCHLORIDE 25 MG: 50 TABLET ORAL at 21:48

## 2020-10-16 RX ADMIN — PENTOXIFYLLINE 400 MG: 400 TABLET, EXTENDED RELEASE ORAL at 17:27

## 2020-10-16 RX ADMIN — Medication 10 ML: at 21:49

## 2020-10-16 RX ADMIN — ATORVASTATIN CALCIUM 10 MG: 10 TABLET, FILM COATED ORAL at 21:48

## 2020-10-16 RX ADMIN — ENOXAPARIN SODIUM 40 MG: 40 INJECTION SUBCUTANEOUS at 11:52

## 2020-10-16 RX ADMIN — CITALOPRAM HYDROBROMIDE 20 MG: 20 TABLET ORAL at 17:27

## 2020-10-16 RX ADMIN — CARVEDILOL 6.25 MG: 6.25 TABLET, FILM COATED ORAL at 17:27

## 2020-10-16 RX ADMIN — SODIUM CHLORIDE 75 ML/HR: 900 INJECTION, SOLUTION INTRAVENOUS at 06:39

## 2020-10-16 RX ADMIN — FENOFIBRATE 145 MG: 145 TABLET ORAL at 21:48

## 2020-10-16 RX ADMIN — MORPHINE SULFATE 1 MG: 2 INJECTION, SOLUTION INTRAMUSCULAR; INTRAVENOUS at 18:02

## 2020-10-16 RX ADMIN — TRAMADOL HYDROCHLORIDE 50 MG: 50 TABLET ORAL at 13:11

## 2020-10-16 RX ADMIN — AMLODIPINE BESYLATE 2.5 MG: 2.5 TABLET ORAL at 11:52

## 2020-10-16 RX ADMIN — FERROUS SULFATE TAB 325 MG (65 MG ELEMENTAL FE) 325 MG: 325 (65 FE) TAB at 11:52

## 2020-10-16 NOTE — PROGRESS NOTES
This patient was given ice water by the CNA and had a total of 400 ml at 04:00 am. CNA was educated about the patient's NPO status.  Patient was made aware earlier about the diet (NPO)

## 2020-10-16 NOTE — PROGRESS NOTES
Hospitalist Progress Note    NAME: Eve Ayoub   :  1940   MRN:  [id number]     Subjective:   Daily Progress Note: 10/16/2020 9:54 AM    Chief complaint: admitted with back pain  Case d/w staff she is c/o pain in back. She is not sure about getting kyphoplasty and will d/w her daughter.       Current Facility-Administered Medications   Medication Dose Route Frequency    carvediloL (COREG) tablet 3.125 mg  3.125 mg Oral BID WITH MEALS    meclizine (ANTIVERT) tablet 6.25 mg  6.25 mg Oral TID PRN    atorvastatin (LIPITOR) tablet 10 mg  10 mg Oral QHS    amLODIPine (NORVASC) tablet 2.5 mg  2.5 mg Oral DAILY    ferrous sulfate tablet 325 mg  325 mg Oral ACB    citalopram (CELEXA) tablet 20 mg  20 mg Oral QPM    traMADoL (ULTRAM) tablet 50 mg  50 mg Oral Q8H PRN    traZODone (DESYREL) tablet 25 mg  25 mg Oral QHS    ascorbic acid (vitamin C) (VITAMIN C) tablet 500 mg  500 mg Oral DAILY    fenofibrate nanocrystallized (TRICOR) tablet 145 mg  145 mg Oral QHS    pentoxifylline CR (TRENTAL) tablet 400 mg  400 mg Oral BID    hydrALAZINE (APRESOLINE) 20 mg/mL injection 10 mg  10 mg IntraVENous Q6H PRN    sodium chloride (NS) flush 5-40 mL  5-40 mL IntraVENous Q8H    sodium chloride (NS) flush 5-40 mL  5-40 mL IntraVENous PRN    acetaminophen (TYLENOL) tablet 650 mg  650 mg Oral Q6H PRN    Or    acetaminophen (TYLENOL) suppository 650 mg  650 mg Rectal Q6H PRN    polyethylene glycol (MIRALAX) packet 17 g  17 g Oral DAILY PRN    promethazine (PHENERGAN) tablet 12.5 mg  12.5 mg Oral Q6H PRN    Or    ondansetron (ZOFRAN) injection 4 mg  4 mg IntraVENous Q6H PRN    enoxaparin (LOVENOX) injection 40 mg  40 mg SubCUTAneous DAILY    morphine injection 1 mg  1 mg IntraVENous Q6H PRN    naloxone (NARCAN) injection 0.4 mg  0.4 mg IntraVENous EVERY 2 MINUTES AS NEEDED    0.9% sodium chloride infusion  75 mL/hr IntraVENous CONTINUOUS          Objective:     Visit Vitals  BP (!) 162/73   Pulse (!) 55 Temp 98.4 °F (36.9 °C)   Resp 18   Ht 5' 2\" (1.575 m)   Wt 73 kg (161 lb)   SpO2 97%   BMI 29.45 kg/m²      O2 Device: Room air    Temp (24hrs), Av.4 °F (36.9 °C), Min:98.2 °F (36.8 °C), Max:98.6 °F (37 °C)        Physical Exam:    Gen:   in no acute distress  HEENT:    hearing intact to voice, moist mucous membranes  Neck: Supple  Resp: No accessory muscle use, clear breath sounds without wheezes rales or rhonchi  Card: No murmurs, normal S1, S2 without thrills or peripheral edema  Abd:   Soft, non-tender, non-distended   Musc: No cyanosis or clubbing  Skin: No rashes or ulcers, skin turgor is good  Neuro:     follows commands appropriately  Psych:   flat affecct      Data Review    Recent Results (from the past 24 hour(s))   URINALYSIS W/ RFLX MICROSCOPIC    Collection Time: 10/15/20  2:40 PM   Result Value Ref Range    Color YELLOW/STRAW      Appearance CLEAR CLEAR      Specific gravity 1.023 1.003 - 1.030      pH (UA) 7.5 5.0 - 8.0      Protein TRACE (A) NEG mg/dL    Glucose Negative NEG mg/dL    Ketone Negative NEG mg/dL    Bilirubin Negative NEG      Blood Negative NEG      Urobilinogen 1.0 0.2 - 1.0 EU/dL    Nitrites Negative NEG      Leukocyte Esterase Negative NEG      WBC 0-4 0 - 4 /hpf    RBC 0-5 0 - 5 /hpf    Epithelial cells FEW FEW /lpf    Bacteria Negative NEG /hpf    Hyaline cast 0-2 0 - 5 /lpf   GLUCOSE, POC    Collection Time: 10/15/20 10:00 PM   Result Value Ref Range    Glucose (POC) 154 (H) 65 - 100 mg/dL    Performed by Darrold Crystal    METABOLIC PANEL, BASIC    Collection Time: 10/16/20  3:14 AM   Result Value Ref Range    Sodium 144 136 - 145 mmol/L    Potassium 4.0 3.5 - 5.1 mmol/L    Chloride 110 (H) 97 - 108 mmol/L    CO2 32 21 - 32 mmol/L    Anion gap 2 (L) 5 - 15 mmol/L    Glucose 94 65 - 100 mg/dL    BUN 22 (H) 6 - 20 MG/DL    Creatinine 0.85 0.55 - 1.02 MG/DL    BUN/Creatinine ratio 26 (H) 12 - 20      GFR est AA >60 >60 ml/min/1.73m2    GFR est non-AA >60 >60 ml/min/1.73m2 Calcium 8.2 (L) 8.5 - 10.1 MG/DL   CBC WITH AUTOMATED DIFF    Collection Time: 10/16/20  3:14 AM   Result Value Ref Range    WBC 4.6 3.6 - 11.0 K/uL    RBC 2.57 (L) 3.80 - 5.20 M/uL    HGB 7.8 (L) 11.5 - 16.0 g/dL    HCT 25.4 (L) 35.0 - 47.0 %    MCV 98.8 80.0 - 99.0 FL    MCH 30.4 26.0 - 34.0 PG    MCHC 30.7 30.0 - 36.5 g/dL    RDW 13.7 11.5 - 14.5 %    PLATELET 97 (L) 680 - 400 K/uL    MPV 10.4 8.9 - 12.9 FL    NRBC 0.0 0  WBC    ABSOLUTE NRBC 0.00 0.00 - 0.01 K/uL    NEUTROPHILS 77 (H) 32 - 75 %    LYMPHOCYTES 16 12 - 49 %    MONOCYTES 5 5 - 13 %    EOSINOPHILS 1 0 - 7 %    BASOPHILS 1 0 - 1 %    IMMATURE GRANULOCYTES 0 0.0 - 0.5 %    ABS. NEUTROPHILS 3.7 1.8 - 8.0 K/UL    ABS. LYMPHOCYTES 0.7 (L) 0.8 - 3.5 K/UL    ABS. MONOCYTES 0.2 0.0 - 1.0 K/UL    ABS. EOSINOPHILS 0.0 0.0 - 0.4 K/UL    ABS. BASOPHILS 0.0 0.0 - 0.1 K/UL    ABS. IMM. GRANS. 0.0 0.00 - 0.04 K/UL    DF MANUAL      RBC COMMENTS NORMOCYTIC, NORMOCHROMIC       No results found for this visit on 10/14/20. All Micro Results     None      MRI L spine  Acute vertebral compression deformities at L1 and L5 with less than 50% loss  vertebral body height. No significant cortical retropulsion or epidural  hematoma. No significant extension into the posterior elements. Fractures are  amenable to percutaneous kyphoplasty as clinically warranted.        Interval severe canal stenosis at L1-L2 L2-L3 and L3-L4 progressed compared to  the prior examination. Additional less severe degenerative findings are stable and as described above. Radiology reports and films for the last 24 hours have been reviewed. Assessment/Plan:         Acute L1/L5 compression fracture  -   MRI L spine noted  As above  - Pain control, PT/OT on case  - Orthopedic  On case  --  Kyphoplasty recommended patient undecided until speak with daughter    Chr anemia- Hgb stable monitor periodically, transfuse if <7.  Check CBC in AM    Hypertension-   home meds adjusted as bp elevated from pain   prn hydralazine     Hyperlipidemia-continue statin     Depression-Continue home medication        Code Status: Full   Surrogate Decision Maker: daughter     DVT Prophylaxis: Lovenox   Dispo: TBD    Signed By: Cleda Gowers, MD     October 16, 2020

## 2020-10-16 NOTE — PROGRESS NOTES
Problem: Pressure Injury - Risk of  Goal: *Prevention of pressure injury  Description: Document Zachary Scale and appropriate interventions in the flowsheet. Outcome: Progressing Towards Goal  Note: Pressure Injury Interventions:  Sensory Interventions: Float heels, Keep linens dry and wrinkle-free, Minimize linen layers         Activity Interventions: Increase time out of bed, Pressure redistribution bed/mattress(bed type)    Mobility Interventions: Float heels, HOB 30 degrees or less    Nutrition Interventions: Document food/fluid/supplement intake    Friction and Shear Interventions: Minimize layers, Lift sheet                Problem: Patient Education: Go to Patient Education Activity  Goal: Patient/Family Education  Outcome: Progressing Towards Goal     Problem: Falls - Risk of  Goal: *Absence of Falls  Description: Document Oscar Fall Risk and appropriate interventions in the flowsheet.   Outcome: Progressing Towards Goal  Note: Fall Risk Interventions:  Mobility Interventions: Communicate number of staff needed for ambulation/transfer, Patient to call before getting OOB         Medication Interventions: Patient to call before getting OOB    Elimination Interventions: Call light in reach, Patient to call for help with toileting needs              Problem: Patient Education: Go to Patient Education Activity  Goal: Patient/Family Education  Outcome: Progressing Towards Goal     Problem: Patient Education: Go to Patient Education Activity  Goal: Patient/Family Education  Outcome: Progressing Towards Goal     Problem: Patient Education: Go to Patient Education Activity  Goal: Patient/Family Education  Outcome: Progressing Towards Goal

## 2020-10-16 NOTE — PROGRESS NOTES
Bedside and Verbal shift change report given to Nam Rojo RN (oncoming nurse) by Lisa Iglesias RN (offgoing nurse). Report included the following information SBAR, Kardex, Intake/Output, MAR, Recent Results and Med Rec Status.

## 2020-10-16 NOTE — PROGRESS NOTES
Spoke with [employer], informed of conversation with daughter and holding off on Kyphoplasty at this time. Also requested she let Katiuska Cline NP know.

## 2020-10-16 NOTE — PROGRESS NOTES
Orthopedic NP Progress Note  Post Op day: * No surgery found *    October 16, 2020 8:33 AM     Tarah Manjarrez    Attending Physician: Treatment Team: Attending Provider: Marissa Flannery MD; Consulting Provider: Amanda Anderson MD; Consulting Provider: Nicolas Mendez MD; Consulting Provider: Ramiro Curran NP; Utilization Review: Iván Domínguez RN; Consulting Provider: Cruz Bae MD; Staff Nurse: Josselin Jeffers RN; Primary Nurse: Jesika Negrete     Vital Signs:    Patient Vitals for the past 8 hrs:   BP Temp Pulse Resp SpO2   10/16/20 0821 (!) 164/51 98.2 °F (36.8 °C) (!) 57 20 96 %   10/16/20 0336 (!) 166/78 98.6 °F (37 °C) (!) 57 18 96 %     BMI (calculated): 29.4 (10/14/20 1623)    Intake/Output:  No intake/output data recorded. 10/14 1901 - 10/16 0700  In: Consuello Sleight [P.O.:240; I.V.:1645]  Out: -     Pain Control:   Pain Assessment  Pain Scale 1: Numeric (0 - 10)  Pain Intensity 1: 3  Pain Onset 1: movement   Pain Location 1: Back  Pain Orientation 1: Lower  Pain Description 1: Aching  Pain Intervention(s) 1: Medication (see MAR)    LAB:    Recent Labs     10/16/20  0314   HCT 25.4*   HGB 7.8*     Lab Results   Component Value Date/Time    Sodium 144 10/16/2020 03:14 AM    Potassium 4.0 10/16/2020 03:14 AM    Chloride 110 (H) 10/16/2020 03:14 AM    CO2 32 10/16/2020 03:14 AM    Glucose 94 10/16/2020 03:14 AM    BUN 22 (H) 10/16/2020 03:14 AM    Creatinine 0.85 10/16/2020 03:14 AM    Calcium 8.2 (L) 10/16/2020 03:14 AM       Subjective:  Tarah Manjarrez is a 78 y.o. female  With compression fx, states pain is well managed and per nursing pt and daughter are going to talk this weekend about kyphoplasty      Objective: General: alert, cooperative, no distress. Neuro/Vascular: CNS Intact. Sensation stable. Brisk cap refill, + pulses UE/LE  Musculoskeletal:  +ROM UE/LE.     Skin: warm and dry     [place] - n  Drain - n       PT/OT:   Gait:                      Assessment:    s/p     Active Problems:    Lumbar vertebral fracture ([address] (10/14/2020)         Plan:   -  Continue PT/OT - up as tolerated   -  Continue established methods of pain control  -  Pt may eat since she is not getting kypho    Signed By: Navid Brown NP    Orthopedic Nurse Practitioner

## 2020-10-16 NOTE — PROGRESS NOTES
Bedside and Verbal shift change report given to [employer] (oncoming nurse) by Malcolm Cohen RN (offgoing nurse). Report included the following information SBAR, Kardex, Procedure Summary, Intake/Output, MAR and Recent Results.

## 2020-10-16 NOTE — PROGRESS NOTES
Problem: Mobility Impaired (Adult and Pediatric)  Goal: *Acute Goals and Plan of Care (Insert Text)  Description: FUNCTIONAL STATUS PRIOR TO ADMISSION: Patient was modified independent using a rolling walker for functional mobility. Patient was modified independent for basic and instrumental ADLs. Patient with lack of activity since last Thursday due to severe pain; h/o GLFs with last GLF being in May/June. HOME SUPPORT PRIOR TO ADMISSION: The patient lived with family but did not require assist.    Physical Therapy Goals  Initiated 10/15/2020  1. Patient will move from supine to sit and sit to supine , scoot up and down, and roll side to side in bed with modified independence within 7 day(s). 2.  Patient will transfer from bed to chair and chair to bed with modified independence using the least restrictive device within 7 day(s). 3.  Patient will perform sit to stand with modified independence within 7 day(s). 4.  Patient will ambulate with modified independence for 150 feet with the least restrictive device within 7 day(s). 5.  Patient will ascend/descend 4 stairs with 1 handrail(s) with modified independence within 7 day(s). Outcome: Progressing Towards Goal   PHYSICAL THERAPY TREATMENT  Patient: Gloria Us (19 y.o. female)  Date: 10/16/2020  Diagnosis: Lumbar vertebral fracture ([facility] [S32.009A]   <principal problem not specified>       Precautions: Fall  Chart, physical therapy assessment, plan of care and goals were reviewed. ASSESSMENT  Patient continues with skilled PT services and is progressing towards goals. Pt presents with decreased strength and increased pain with all mobility. Pt performed bed mobility at min A/mod A x2. Pt requiring max A x2 to scoot to EOB. Pt performed sit to stand transfer at min Ax2. Pt able to take steps to the [facility] at min A. Pt requiring mod A x2 to return to supine due to increased pain. Pts pain limiting mobility.         Current Level of Function Impacting Discharge (mobility/balance): bed mobility at min A/mod A x2, sit to stand transfer at min A x2. Other factors to consider for discharge: pain          PLAN :  Patient continues to benefit from skilled intervention to address the above impairments. Continue treatment per established plan of care. to address goals. Recommendation for discharge: (in order for the patient to meet his/her long term goals)  Therapy up to 5 days/week in SNF setting or intensive home health therapy program with 24/7 supervision      This discharge recommendation:  Has been made in collaboration with the attending provider and/or case management    IF patient discharges home will need the following DME: to be determined (TBD)       SUBJECTIVE:   Patient stated  I hurt I need to lay back down.     OBJECTIVE DATA SUMMARY:   Critical Behavior:  Neurologic State: Alert, Appropriate for age  Orientation Level: Oriented to person, Oriented to place, Oriented to situation  Cognition: Follows commands  Safety/Judgement: Fall prevention  Functional Mobility Training:  Bed Mobility:  Rolling: Minimum assistance; Additional time  Supine to Sit: Minimum assistance;Assist x2; Additional time  Sit to Supine: Minimum assistance; Moderate assistance;Assist x2; Additional time  Scooting: Maximum assistance;Assist x2        Transfers:  Sit to Stand: Minimum assistance;Assist x2                                Balance:  Sitting: Impaired  Sitting - Static: Fair (occasional)  Sitting - Dynamic: Fair (occasional)  Standing: Impaired  Standing - Static: Fair;Constant support  Standing - Dynamic : Fair;Constant support  Ambulation/Gait Training:         Pt able to take steps to [facility] with RW at min A x2     Pain Rating:  Pt with increased pain with all mobility. Activity Tolerance:   Poor  Please refer to the flowsheet for vital signs taken during this treatment.     After treatment patient left in no apparent distress:   Supine in bed, Call bell within reach, and Side rails x 3    COMMUNICATION/COLLABORATION:   The patients plan of care was discussed with: Registered nurse.      Roseline Mckeon PTA   Time Calculation: 9 mins

## 2020-10-16 NOTE — PROGRESS NOTES
Spoke with Patient daughter Hemalatha Modi at [phone]. Will hold off on Kyphoplasty today daughter wants to speak with mother about procedure and explain the benefit of having it done. Daughter requested information on kyphoplasty be left in patients room so when she comes to hospital tomorrow she can review.

## 2020-10-16 NOTE — PROGRESS NOTES
Problem: Pressure Injury - Risk of  Goal: *Prevention of pressure injury  Description: Document Zachary Scale and appropriate interventions in the flowsheet. Outcome: Progressing Towards Goal  Note: Pressure Injury Interventions:  Sensory Interventions: Float heels, Keep linens dry and wrinkle-free, Minimize linen layers         Activity Interventions: Increase time out of bed, Pressure redistribution bed/mattress(bed type)    Mobility Interventions: Float heels, HOB 30 degrees or less    Nutrition Interventions: Document food/fluid/supplement intake    Friction and Shear Interventions: Minimize layers, Lift sheet                Problem: Patient Education: Go to Patient Education Activity  Goal: Patient/Family Education  Outcome: Progressing Towards Goal     Problem: Falls - Risk of  Goal: *Absence of Falls  Description: Document Oscar Fall Risk and appropriate interventions in the flowsheet.   Outcome: Progressing Towards Goal  Note: Fall Risk Interventions:  Mobility Interventions: Communicate number of staff needed for ambulation/transfer, Patient to call before getting OOB         Medication Interventions: Patient to call before getting OOB    Elimination Interventions: Call light in reach, Patient to call for help with toileting needs              Problem: Patient Education: Go to Patient Education Activity  Goal: Patient/Family Education  Outcome: Progressing Towards Goal

## 2020-10-16 NOTE — PROGRESS NOTES
DR Swenson Bring patient on Lasix, Levothyroxine, and Vit B12, need to renew. List of med in the chart.

## 2020-10-17 LAB
ANION GAP SERPL CALC-SCNC: 2 MMOL/L (ref 5–15)
BASOPHILS # BLD: 0 K/UL (ref 0–0.1)
BASOPHILS NFR BLD: 1 % (ref 0–1)
BUN SERPL-MCNC: 19 MG/DL (ref 6–20)
BUN/CREAT SERPL: 22 (ref 12–20)
CALCIUM SERPL-MCNC: 8.5 MG/DL (ref 8.5–10.1)
CHLORIDE SERPL-SCNC: 114 MMOL/L (ref 97–108)
CO2 SERPL-SCNC: 29 MMOL/L (ref 21–32)
CREAT SERPL-MCNC: 0.85 MG/DL (ref 0.55–1.02)
DIFFERENTIAL METHOD BLD: ABNORMAL
EOSINOPHIL # BLD: 0.2 K/UL (ref 0–0.4)
EOSINOPHIL NFR BLD: 4 % (ref 0–7)
ERYTHROCYTE [DISTWIDTH] IN BLOOD BY AUTOMATED COUNT: 13.5 % (ref 11.5–14.5)
GLUCOSE BLD STRIP.AUTO-MCNC: 138 MG/DL (ref 65–100)
GLUCOSE BLD STRIP.AUTO-MCNC: 186 MG/DL (ref 65–100)
GLUCOSE BLD STRIP.AUTO-MCNC: 220 MG/DL (ref 65–100)
GLUCOSE SERPL-MCNC: 105 MG/DL (ref 65–100)
HCT VFR BLD AUTO: 27.5 % (ref 35–47)
HGB BLD-MCNC: 8.4 G/DL (ref 11.5–16)
IMM GRANULOCYTES # BLD AUTO: 0 K/UL (ref 0–0.04)
IMM GRANULOCYTES NFR BLD AUTO: 1 % (ref 0–0.5)
LYMPHOCYTES # BLD: 1.2 K/UL (ref 0.8–3.5)
LYMPHOCYTES NFR BLD: 28 % (ref 12–49)
MCH RBC QN AUTO: 30.3 PG (ref 26–34)
MCHC RBC AUTO-ENTMCNC: 30.5 G/DL (ref 30–36.5)
MCV RBC AUTO: 99.3 FL (ref 80–99)
MONOCYTES # BLD: 0.5 K/UL (ref 0–1)
MONOCYTES NFR BLD: 12 % (ref 5–13)
NEUTS SEG # BLD: 2.3 K/UL (ref 1.8–8)
NEUTS SEG NFR BLD: 54 % (ref 32–75)
NRBC # BLD: 0 K/UL (ref 0–0.01)
NRBC BLD-RTO: 0 PER 100 WBC
PLATELET # BLD AUTO: 111 K/UL (ref 150–400)
PMV BLD AUTO: 10.8 FL (ref 8.9–12.9)
POTASSIUM SERPL-SCNC: 4.5 MMOL/L (ref 3.5–5.1)
RBC # BLD AUTO: 2.77 M/UL (ref 3.8–5.2)
SERVICE CMNT-IMP: ABNORMAL
SODIUM SERPL-SCNC: 145 MMOL/L (ref 136–145)
WBC # BLD AUTO: 4.2 K/UL (ref 3.6–11)

## 2020-10-17 PROCEDURE — 85025 COMPLETE CBC W/AUTO DIFF WBC: CPT

## 2020-10-17 PROCEDURE — 80048 BASIC METABOLIC PNL TOTAL CA: CPT

## 2020-10-17 PROCEDURE — 74011250636 HC RX REV CODE- 250/636: Performed by: INTERNAL MEDICINE

## 2020-10-17 PROCEDURE — 74011250637 HC RX REV CODE- 250/637: Performed by: INTERNAL MEDICINE

## 2020-10-17 PROCEDURE — 82962 GLUCOSE BLOOD TEST: CPT

## 2020-10-17 PROCEDURE — 65270000029 HC RM PRIVATE

## 2020-10-17 PROCEDURE — 74011250637 HC RX REV CODE- 250/637: Performed by: HOSPITALIST

## 2020-10-17 PROCEDURE — 77030038269 HC DRN EXT URIN PURWCK BARD -A

## 2020-10-17 PROCEDURE — 36415 COLL VENOUS BLD VENIPUNCTURE: CPT

## 2020-10-17 RX ORDER — MORPHINE SULFATE 2 MG/ML
1 INJECTION, SOLUTION INTRAMUSCULAR; INTRAVENOUS
Status: DISCONTINUED | OUTPATIENT
Start: 2020-10-17 | End: 2020-10-21 | Stop reason: HOSPADM

## 2020-10-17 RX ADMIN — MORPHINE SULFATE 1 MG: 2 INJECTION, SOLUTION INTRAMUSCULAR; INTRAVENOUS at 17:46

## 2020-10-17 RX ADMIN — Medication 10 ML: at 06:23

## 2020-10-17 RX ADMIN — OXYCODONE HYDROCHLORIDE AND ACETAMINOPHEN 500 MG: 500 TABLET ORAL at 09:36

## 2020-10-17 RX ADMIN — CARVEDILOL 6.25 MG: 6.25 TABLET, FILM COATED ORAL at 09:36

## 2020-10-17 RX ADMIN — TRAMADOL HYDROCHLORIDE 50 MG: 50 TABLET ORAL at 21:20

## 2020-10-17 RX ADMIN — TRAZODONE HYDROCHLORIDE 25 MG: 50 TABLET ORAL at 21:20

## 2020-10-17 RX ADMIN — AMLODIPINE BESYLATE 5 MG: 5 TABLET ORAL at 09:35

## 2020-10-17 RX ADMIN — FERROUS SULFATE TAB 325 MG (65 MG ELEMENTAL FE) 325 MG: 325 (65 FE) TAB at 09:35

## 2020-10-17 RX ADMIN — FENOFIBRATE 145 MG: 145 TABLET ORAL at 21:20

## 2020-10-17 RX ADMIN — ACETAMINOPHEN 650 MG: 325 TABLET ORAL at 17:46

## 2020-10-17 RX ADMIN — MORPHINE SULFATE 1 MG: 2 INJECTION, SOLUTION INTRAMUSCULAR; INTRAVENOUS at 13:06

## 2020-10-17 RX ADMIN — ENOXAPARIN SODIUM 40 MG: 40 INJECTION SUBCUTANEOUS at 09:36

## 2020-10-17 RX ADMIN — ACETAMINOPHEN 650 MG: 325 TABLET ORAL at 09:35

## 2020-10-17 RX ADMIN — PENTOXIFYLLINE 400 MG: 400 TABLET, EXTENDED RELEASE ORAL at 09:38

## 2020-10-17 RX ADMIN — Medication 5 ML: at 21:20

## 2020-10-17 RX ADMIN — ATORVASTATIN CALCIUM 10 MG: 10 TABLET, FILM COATED ORAL at 21:20

## 2020-10-17 RX ADMIN — TRAMADOL HYDROCHLORIDE 50 MG: 50 TABLET ORAL at 11:44

## 2020-10-17 RX ADMIN — CARVEDILOL 6.25 MG: 6.25 TABLET, FILM COATED ORAL at 17:46

## 2020-10-17 RX ADMIN — PENTOXIFYLLINE 400 MG: 400 TABLET, EXTENDED RELEASE ORAL at 18:16

## 2020-10-17 RX ADMIN — CITALOPRAM HYDROBROMIDE 20 MG: 20 TABLET ORAL at 17:46

## 2020-10-17 RX ADMIN — Medication 10 ML: at 13:09

## 2020-10-17 NOTE — PROGRESS NOTES
Problem: Pressure Injury - Risk of  Goal: *Prevention of pressure injury  Description: Document Zachary Scale and appropriate interventions in the flowsheet. Outcome: Progressing Towards Goal  Note: Pressure Injury Interventions:  Sensory Interventions: Assess changes in LOC, Assess need for specialty bed, Avoid rigorous massage over bony prominences, Chair cushion, Check visual cues for pain, Discuss PT/OT consult with provider, Float heels, Keep linens dry and wrinkle-free, Maintain/enhance activity level, Minimize linen layers, Use 30-degree side-lying position, Turn and reposition approx. every two hours (pillows and wedges if needed), Sit a 90-degree angle/use footstool if needed, Pressure redistribution bed/mattress (bed type), Pad between skin to skin, Monitor skin under medical devices    Moisture Interventions: Absorbent underpads, Apply protective barrier, creams and emollients, Assess need for specialty bed, Contain wound drainage, Check for incontinence Q2 hours and as needed, Internal/External urinary devices, Limit adult briefs, Maintain skin hydration (lotion/cream), Minimize layers, Moisture barrier, Offer toileting Q_hr    Activity Interventions: Assess need for specialty bed, Increase time out of bed, Chair cushion, Pressure redistribution bed/mattress(bed type), PT/OT evaluation    Mobility Interventions: Assess need for specialty bed, Chair cushion, Float heels, Pressure redistribution bed/mattress (bed type), PT/OT evaluation, HOB 30 degrees or less, Turn and reposition approx.  every two hours(pillow and wedges)    Nutrition Interventions: Document food/fluid/supplement intake, Discuss nutritional consult with provider, Offer support with meals,snacks and hydration    Friction and Shear Interventions: Foam dressings/transparent film/skin sealants, HOB 30 degrees or less, Feet elevated on foot rest, Apply protective barrier, creams and emollients, Lift sheet, Lift team/patient mobility team, Minimize layers                Problem: Patient Education: Go to Patient Education Activity  Goal: Patient/Family Education  Outcome: Progressing Towards Goal     Problem: Falls - Risk of  Goal: *Absence of Falls  Description: Document Alyson Reyes Fall Risk and appropriate interventions in the flowsheet.   Outcome: Progressing Towards Goal  Note: Fall Risk Interventions:  Mobility Interventions: Assess mobility with egress test, Communicate number of staff needed for ambulation/transfer, PT Consult for mobility concerns, OT consult for ADLs, Patient to call before getting OOB, PT Consult for assist device competence, Strengthening exercises (ROM-active/passive), Utilize walker, cane, or other assistive device, Utilize gait belt for transfers/ambulation         Medication Interventions: Assess postural VS orthostatic hypotension, Evaluate medications/consider consulting pharmacy, Patient to call before getting OOB, Teach patient to arise slowly, Utilize gait belt for transfers/ambulation    Elimination Interventions: Call light in reach, Stay With Me (per policy), Toilet paper/wipes in reach, Toileting schedule/hourly rounds, Patient to call for help with toileting needs, Elevated toilet seat    History of Falls Interventions: Consult care management for discharge planning, Door open when patient unattended, Evaluate medications/consider consulting pharmacy, Room close to nurse's station, Utilize gait belt for transfer/ambulation, Assess for delayed presentation/identification of injury for 48 hrs (comment for end date), Vital signs minimum Q4HRs X 24 hrs (comment for end date)         Problem: Patient Education: Go to Patient Education Activity  Goal: Patient/Family Education  Outcome: Progressing Towards Goal     Problem: Patient Education: Go to Patient Education Activity  Goal: Patient/Family Education  Outcome: Progressing Towards Goal     Problem: Patient Education: Go to Patient Education Activity  Goal: Patient/Family Education  Outcome: Progressing Towards Goal     Problem: Pain  Goal: *Control of Pain  Outcome: Progressing Towards Goal  Goal: *PALLIATIVE CARE:  Alleviation of Pain  Outcome: Progressing Towards Goal     Problem: Patient Education: Go to Patient Education Activity  Goal: Patient/Family Education  Outcome: Progressing Towards Goal

## 2020-10-17 NOTE — PROGRESS NOTES
Bedside and Verbal shift change report given to Melissa MULLIGAN (oncoming nurse) by Iraj Porter (offgoing nurse). Report included the following information SBAR, Kardex, Intake/Output, MAR and Recent Results.

## 2020-10-17 NOTE — ROUTINE PROCESS
Bedside and Verbal shift change report given to Stephanie (oncoming nurse) by Chase Lee RN (offgoing nurse). Report included the following information SBAR, Kardex, Intake/Output, MAR and Recent Results.

## 2020-10-17 NOTE — PROGRESS NOTES
Bedside and Verbal shift change report given to [facility], RN (oncoming nurse) by Karime Parham RN (offgoing nurse). Report included the following information SBAR, Kardex, Intake/Output, MAR, Accordion and Med Rec Status.

## 2020-10-17 NOTE — PROGRESS NOTES
Bedside shift change report given to [place] (oncoming nurse) by Sajan Nelson (offgoing nurse). Report included the following information SBAR, Kardex, Intake/Output, MAR, Accordion and Recent Results.

## 2020-10-17 NOTE — PROGRESS NOTES
Hospitalist Progress Note    NAME: Kel Dickinson   :  1940   MRN:  [id number]     Subjective:   Daily Progress Note: 10/17/2020 9:54 AM    Chief complaint: admitted with back pain  Case d/w staff she is c/o pain in back. She states her pain is a 9/10, and is not controlled. She is very alert and animated, states she needs better control, and she wants to go home. She does agree to rehab . She states her daughter needs to make the decision about surgery.       Current Facility-Administered Medications   Medication Dose Route Frequency    morphine injection 1 mg  1 mg IntraVENous Q4H PRN    amLODIPine (NORVASC) tablet 5 mg  5 mg Oral DAILY    carvediloL (COREG) tablet 6.25 mg  6.25 mg Oral BID WITH MEALS    meclizine (ANTIVERT) tablet 6.25 mg  6.25 mg Oral TID PRN    atorvastatin (LIPITOR) tablet 10 mg  10 mg Oral QHS    ferrous sulfate tablet 325 mg  325 mg Oral ACB    citalopram (CELEXA) tablet 20 mg  20 mg Oral QPM    traMADoL (ULTRAM) tablet 50 mg  50 mg Oral Q8H PRN    traZODone (DESYREL) tablet 25 mg  25 mg Oral QHS    ascorbic acid (vitamin C) (VITAMIN C) tablet 500 mg  500 mg Oral DAILY    fenofibrate nanocrystallized (TRICOR) tablet 145 mg  145 mg Oral QHS    pentoxifylline CR (TRENTAL) tablet 400 mg  400 mg Oral BID    hydrALAZINE (APRESOLINE) 20 mg/mL injection 10 mg  10 mg IntraVENous Q6H PRN    sodium chloride (NS) flush 5-40 mL  5-40 mL IntraVENous Q8H    sodium chloride (NS) flush 5-40 mL  5-40 mL IntraVENous PRN    acetaminophen (TYLENOL) tablet 650 mg  650 mg Oral Q6H PRN    Or    acetaminophen (TYLENOL) suppository 650 mg  650 mg Rectal Q6H PRN    polyethylene glycol (MIRALAX) packet 17 g  17 g Oral DAILY PRN    promethazine (PHENERGAN) tablet 12.5 mg  12.5 mg Oral Q6H PRN    Or    ondansetron (ZOFRAN) injection 4 mg  4 mg IntraVENous Q6H PRN    enoxaparin (LOVENOX) injection 40 mg  40 mg SubCUTAneous DAILY    naloxone (NARCAN) injection 0.4 mg  0.4 mg IntraVENous EVERY 2 MINUTES AS NEEDED    0.9% sodium chloride infusion  25 mL/hr IntraVENous CONTINUOUS          Objective:     Visit Vitals  /70   Pulse 60   Temp 98.4 °F (36.9 °C)   Resp 18   Ht 5' 2\" (1.575 m)   Wt 73 kg (161 lb)   SpO2 96%   BMI 29.45 kg/m²      O2 Device: Room air    Temp (24hrs), Av.4 °F (36.9 °C), Min:98.1 °F (36.7 °C), Max:98.5 °F (36.9 °C)        Physical Exam:    Gen:   in no acute distress  HEENT:    hearing intact to voice, moist mucous membranes  Neck: Supple  Resp: No accessory muscle use, clear breath sounds without wheezes rales or rhonchi  Card: No murmurs, normal S1, S2 without thrills or peripheral edema  Abd:   Soft, non-tender, non-distended   Musc: No cyanosis or clubbing  Skin: No rashes or ulcers, skin turgor is good  Neuro:     follows commands appropriately  Psych:   flat affecct      Data Review    Recent Results (from the past 24 hour(s))   METABOLIC PANEL, BASIC    Collection Time: 10/17/20  5:05 AM   Result Value Ref Range    Sodium 145 136 - 145 mmol/L    Potassium 4.5 3.5 - 5.1 mmol/L    Chloride 114 (H) 97 - 108 mmol/L    CO2 29 21 - 32 mmol/L    Anion gap 2 (L) 5 - 15 mmol/L    Glucose 105 (H) 65 - 100 mg/dL    BUN 19 6 - 20 MG/DL    Creatinine 0.85 0.55 - 1.02 MG/DL    BUN/Creatinine ratio 22 (H) 12 - 20      GFR est AA >60 >60 ml/min/1.73m2    GFR est non-AA >60 >60 ml/min/1.73m2    Calcium 8.5 8.5 - 10.1 MG/DL   CBC WITH AUTOMATED DIFF    Collection Time: 10/17/20  5:05 AM   Result Value Ref Range    WBC 4.2 3.6 - 11.0 K/uL    RBC 2.77 (L) 3.80 - 5.20 M/uL    HGB 8.4 (L) 11.5 - 16.0 g/dL    HCT 27.5 (L) 35.0 - 47.0 %    MCV 99.3 (H) 80.0 - 99.0 FL    MCH 30.3 26.0 - 34.0 PG    MCHC 30.5 30.0 - 36.5 g/dL    RDW 13.5 11.5 - 14.5 %    PLATELET 619 (L) 659 - 400 K/uL    MPV 10.8 8.9 - 12.9 FL    NRBC 0.0 0  WBC    ABSOLUTE NRBC 0.00 0.00 - 0.01 K/uL    NEUTROPHILS 54 32 - 75 %    LYMPHOCYTES 28 12 - 49 %    MONOCYTES 12 5 - 13 %    EOSINOPHILS 4 0 - 7 % BASOPHILS 1 0 - 1 %    IMMATURE GRANULOCYTES 1 (H) 0.0 - 0.5 %    ABS. NEUTROPHILS 2.3 1.8 - 8.0 K/UL    ABS. LYMPHOCYTES 1.2 0.8 - 3.5 K/UL    ABS. MONOCYTES 0.5 0.0 - 1.0 K/UL    ABS. EOSINOPHILS 0.2 0.0 - 0.4 K/UL    ABS. BASOPHILS 0.0 0.0 - 0.1 K/UL    ABS. IMM. GRANS. 0.0 0.00 - 0.04 K/UL    DF AUTOMATED     GLUCOSE, POC    Collection Time: 10/17/20 12:24 PM   Result Value Ref Range    Glucose (POC) 138 (H) 65 - 100 mg/dL    Performed by Shin Mccarthy      No results found for this visit on 10/14/20. All Micro Results     None      MRI L spine  Acute vertebral compression deformities at L1 and L5 with less than 50% loss  vertebral body height. No significant cortical retropulsion or epidural  hematoma. No significant extension into the posterior elements. Fractures are  amenable to percutaneous kyphoplasty as clinically warranted.        Interval severe canal stenosis at L1-L2 L2-L3 and L3-L4 progressed compared to  the prior examination. Additional less severe degenerative findings are stable and as described above. Radiology reports and films for the last 24 hours have been reviewed. Assessment/Plan:         Acute L1/L5 compression fracture  -  MRI L spine noted  As above  -  Pain control - NOT controlled - increasing morphine  -- PT/OT on case  - Orthopedic  On case  --  Kyphoplasty recommended patient undecided until speak with daughter  -- Needs rehab placement or intensive home care    Chr anemia- Hgb stable monitor periodically, transfuse if <7.  Check CBC in AM    Hypertension-   home meds adjusted as bp elevated from pain   prn hydralazine     Hyperlipidemia-continue statin     Depression-Continue home medication        Code Status: Full   Surrogate Decision Maker: daughter     DVT Prophylaxis: Lovenox   Dispo: TBD    Signed By: Meryle Breslow, MD     October 17, 2020

## 2020-10-17 NOTE — PROGRESS NOTES
Problem: Pressure Injury - Risk of  Goal: *Prevention of pressure injury  Description: Document Zachary Scale and appropriate interventions in the flowsheet. Outcome: Progressing Towards Goal  Note: Pressure Injury Interventions:  Sensory Interventions: Assess changes in LOC, Float heels, Keep linens dry and wrinkle-free         Activity Interventions: Increase time out of bed    Mobility Interventions: Float heels, HOB 30 degrees or less    Nutrition Interventions: Document food/fluid/supplement intake    Friction and Shear Interventions: Minimize layers, Lift sheet                Problem: Patient Education: Go to Patient Education Activity  Goal: Patient/Family Education  Outcome: Progressing Towards Goal     Problem: Falls - Risk of  Goal: *Absence of Falls  Description: Document Oscar Fall Risk and appropriate interventions in the flowsheet.   Outcome: Progressing Towards Goal  Note: Fall Risk Interventions:  Mobility Interventions: Communicate number of staff needed for ambulation/transfer         Medication Interventions: Patient to call before getting OOB, Teach patient to arise slowly    Elimination Interventions: Call light in reach              Problem: Patient Education: Go to Patient Education Activity  Goal: Patient/Family Education  Outcome: Progressing Towards Goal

## 2020-10-18 PROCEDURE — 74011250636 HC RX REV CODE- 250/636: Performed by: INTERNAL MEDICINE

## 2020-10-18 PROCEDURE — 74011250636 HC RX REV CODE- 250/636: Performed by: HOSPITALIST

## 2020-10-18 PROCEDURE — 74011250637 HC RX REV CODE- 250/637: Performed by: HOSPITALIST

## 2020-10-18 PROCEDURE — 65270000029 HC RM PRIVATE

## 2020-10-18 PROCEDURE — 74011250637 HC RX REV CODE- 250/637: Performed by: INTERNAL MEDICINE

## 2020-10-18 RX ADMIN — PENTOXIFYLLINE 400 MG: 400 TABLET, EXTENDED RELEASE ORAL at 17:47

## 2020-10-18 RX ADMIN — ENOXAPARIN SODIUM 40 MG: 40 INJECTION SUBCUTANEOUS at 08:39

## 2020-10-18 RX ADMIN — PENTOXIFYLLINE 400 MG: 400 TABLET, EXTENDED RELEASE ORAL at 08:40

## 2020-10-18 RX ADMIN — MORPHINE SULFATE 1 MG: 2 INJECTION, SOLUTION INTRAMUSCULAR; INTRAVENOUS at 03:04

## 2020-10-18 RX ADMIN — MORPHINE SULFATE 1 MG: 2 INJECTION, SOLUTION INTRAMUSCULAR; INTRAVENOUS at 14:36

## 2020-10-18 RX ADMIN — TRAZODONE HYDROCHLORIDE 25 MG: 50 TABLET ORAL at 21:19

## 2020-10-18 RX ADMIN — FENOFIBRATE 145 MG: 145 TABLET ORAL at 21:19

## 2020-10-18 RX ADMIN — CITALOPRAM HYDROBROMIDE 20 MG: 20 TABLET ORAL at 17:10

## 2020-10-18 RX ADMIN — TRAMADOL HYDROCHLORIDE 50 MG: 50 TABLET ORAL at 17:10

## 2020-10-18 RX ADMIN — ATORVASTATIN CALCIUM 10 MG: 10 TABLET, FILM COATED ORAL at 21:19

## 2020-10-18 RX ADMIN — SODIUM CHLORIDE 25 ML/HR: 900 INJECTION, SOLUTION INTRAVENOUS at 21:19

## 2020-10-18 RX ADMIN — ACETAMINOPHEN 650 MG: 325 TABLET ORAL at 17:10

## 2020-10-18 RX ADMIN — MORPHINE SULFATE 1 MG: 2 INJECTION, SOLUTION INTRAMUSCULAR; INTRAVENOUS at 21:19

## 2020-10-18 RX ADMIN — AMLODIPINE BESYLATE 5 MG: 5 TABLET ORAL at 08:40

## 2020-10-18 RX ADMIN — TRAMADOL HYDROCHLORIDE 50 MG: 50 TABLET ORAL at 08:39

## 2020-10-18 RX ADMIN — OXYCODONE HYDROCHLORIDE AND ACETAMINOPHEN 500 MG: 500 TABLET ORAL at 08:39

## 2020-10-18 RX ADMIN — Medication 5 ML: at 21:19

## 2020-10-18 RX ADMIN — FERROUS SULFATE TAB 325 MG (65 MG ELEMENTAL FE) 325 MG: 325 (65 FE) TAB at 08:39

## 2020-10-18 RX ADMIN — Medication 10 ML: at 14:36

## 2020-10-18 RX ADMIN — ACETAMINOPHEN 650 MG: 325 TABLET ORAL at 08:39

## 2020-10-18 NOTE — PROGRESS NOTES
Daughter stated that she is okay for kyphoplasty to take place tomorrow but needs to be notified of what time the patient will have this procedure.

## 2020-10-18 NOTE — PROGRESS NOTES
Hospitalist Progress Note    NAME: Maria D Houston   :  1940   MRN:  [id number]     Subjective:   Daily Progress Note: 10/18/2020 9:54 AM    Chief complaint: admitted with back pain  Case d/w staff she is c/o pain in back. States pain is better controlled today. Decision regarding surgery to be made by her daughter.     Current Facility-Administered Medications   Medication Dose Route Frequency    morphine injection 1 mg  1 mg IntraVENous Q4H PRN    amLODIPine (NORVASC) tablet 5 mg  5 mg Oral DAILY    carvediloL (COREG) tablet 6.25 mg  6.25 mg Oral BID WITH MEALS    meclizine (ANTIVERT) tablet 6.25 mg  6.25 mg Oral TID PRN    atorvastatin (LIPITOR) tablet 10 mg  10 mg Oral QHS    ferrous sulfate tablet 325 mg  325 mg Oral ACB    citalopram (CELEXA) tablet 20 mg  20 mg Oral QPM    traMADoL (ULTRAM) tablet 50 mg  50 mg Oral Q8H PRN    traZODone (DESYREL) tablet 25 mg  25 mg Oral QHS    ascorbic acid (vitamin C) (VITAMIN C) tablet 500 mg  500 mg Oral DAILY    fenofibrate nanocrystallized (TRICOR) tablet 145 mg  145 mg Oral QHS    pentoxifylline CR (TRENTAL) tablet 400 mg  400 mg Oral BID    hydrALAZINE (APRESOLINE) 20 mg/mL injection 10 mg  10 mg IntraVENous Q6H PRN    sodium chloride (NS) flush 5-40 mL  5-40 mL IntraVENous Q8H    sodium chloride (NS) flush 5-40 mL  5-40 mL IntraVENous PRN    acetaminophen (TYLENOL) tablet 650 mg  650 mg Oral Q6H PRN    Or    acetaminophen (TYLENOL) suppository 650 mg  650 mg Rectal Q6H PRN    polyethylene glycol (MIRALAX) packet 17 g  17 g Oral DAILY PRN    promethazine (PHENERGAN) tablet 12.5 mg  12.5 mg Oral Q6H PRN    Or    ondansetron (ZOFRAN) injection 4 mg  4 mg IntraVENous Q6H PRN    enoxaparin (LOVENOX) injection 40 mg  40 mg SubCUTAneous DAILY    naloxone (NARCAN) injection 0.4 mg  0.4 mg IntraVENous EVERY 2 MINUTES AS NEEDED    0.9% sodium chloride infusion  25 mL/hr IntraVENous CONTINUOUS          Objective:     Visit Vitals  BP (!) 165/78   Pulse (!) 54   Temp 98.2 °F (36.8 °C)   Resp 18   Ht 5' 2\" (1.575 m)   Wt 73 kg (161 lb)   SpO2 97%   BMI 29.45 kg/m²      O2 Device: Room air    Temp (24hrs), Av.4 °F (36.9 °C), Min:98.2 °F (36.8 °C), Max:98.5 °F (36.9 °C)        Physical Exam:    Gen:   in no acute distress  HEENT:    hearing intact to voice, moist mucous membranes  Neck: Supple  Resp: No accessory muscle use, clear breath sounds without wheezes rales or rhonchi  Card: No murmurs, normal S1, S2 without thrills or peripheral edema  Abd:   Soft, non-tender, non-distended   Musc: No cyanosis or clubbing  Skin: No rashes or ulcers, skin turgor is good  Neuro:     follows commands appropriately  Psych:   flat affecct      Data Review    Recent Results (from the past 24 hour(s))   GLUCOSE, POC    Collection Time: 10/17/20  4:31 PM   Result Value Ref Range    Glucose (POC) 186 (H) 65 - 100 mg/dL    Performed by Matt Simental    GLUCOSE, POC    Collection Time: 10/17/20  9:05 PM   Result Value Ref Range    Glucose (POC) 220 (H) 65 - 100 mg/dL    Performed by Mayte Durand PCT      No results found for this visit on 10/14/20. All Micro Results     None      MRI L spine  Acute vertebral compression deformities at L1 and L5 with less than 50% loss  vertebral body height. No significant cortical retropulsion or epidural  hematoma. No significant extension into the posterior elements. Fractures are  amenable to percutaneous kyphoplasty as clinically warranted.        Interval severe canal stenosis at L1-L2 L2-L3 and L3-L4 progressed compared to  the prior examination. Additional less severe degenerative findings are stable and as described above. Radiology reports and films for the last 24 hours have been reviewed.     Assessment/Plan:         Acute L1/L5 compression fracture  -  MRI L spine noted  As above  -  Pain control - improved with current regimen - will need to eventually wean to PO however  -- PT/OT on case  - Orthopedic  On case  -- Kyphoplasty recommended patient undecided until speak with daughter  -- Needs rehab placement or intensive home care    Chr anemia- Hgb stable monitor periodically, transfuse if <7.  Check CBC in AM    Hypertension-   home meds adjusted as bp elevated from pain   prn hydralazine     Hyperlipidemia-continue statin     Depression-Continue home medication        Code Status: Full   Surrogate Decision Maker: daughter     DVT Prophylaxis: Lovenox   Dispo: TBD    Signed By: Yogesh Hall MD     October 18, 2020

## 2020-10-18 NOTE — PROGRESS NOTES
Problem: Pressure Injury - Risk of  Goal: *Prevention of pressure injury  Description: Document Zachary Scale and appropriate interventions in the flowsheet. Outcome: Progressing Towards Goal  Note: Pressure Injury Interventions:  Sensory Interventions: Assess changes in LOC, Assess need for specialty bed, Avoid rigorous massage over bony prominences, Check visual cues for pain, Discuss PT/OT consult with provider, Chair cushion, Keep linens dry and wrinkle-free, Float heels, Minimize linen layers, Maintain/enhance activity level    Moisture Interventions: Absorbent underpads, Apply protective barrier, creams and emollients, Assess need for specialty bed, Check for incontinence Q2 hours and as needed, Contain wound drainage, Offer toileting Q_hr, Moisture barrier, Maintain skin hydration (lotion/cream), Minimize layers    Activity Interventions: Assess need for specialty bed, Chair cushion, Increase time out of bed, Pressure redistribution bed/mattress(bed type), PT/OT evaluation    Mobility Interventions: Assess need for specialty bed, Chair cushion, Float heels, HOB 30 degrees or less, Pressure redistribution bed/mattress (bed type), PT/OT evaluation    Nutrition Interventions: Document food/fluid/supplement intake, Discuss nutritional consult with provider, Offer support with meals,snacks and hydration    Friction and Shear Interventions: Feet elevated on foot rest, Apply protective barrier, creams and emollients, HOB 30 degrees or less, Foam dressings/transparent film/skin sealants, Lift sheet, Lift team/patient mobility team, Minimize layers                Problem: Patient Education: Go to Patient Education Activity  Goal: Patient/Family Education  Outcome: Progressing Towards Goal     Problem: Falls - Risk of  Goal: *Absence of Falls  Description: Document Oscar Fall Risk and appropriate interventions in the flowsheet.   Outcome: Progressing Towards Goal  Note: Fall Risk Interventions:  Mobility Interventions: Assess mobility with egress test, Communicate number of staff needed for ambulation/transfer, OT consult for ADLs, Patient to call before getting OOB, PT Consult for mobility concerns, Strengthening exercises (ROM-active/passive), PT Consult for assist device competence, Utilize walker, cane, or other assistive device, Utilize gait belt for transfers/ambulation         Medication Interventions: Assess postural VS orthostatic hypotension, Patient to call before getting OOB, Evaluate medications/consider consulting pharmacy, Utilize gait belt for transfers/ambulation, Teach patient to arise slowly    Elimination Interventions: Call light in reach, Elevated toilet seat, Patient to call for help with toileting needs, Stay With Me (per policy), Toilet paper/wipes in reach, Toileting schedule/hourly rounds    History of Falls Interventions: Consult care management for discharge planning, Door open when patient unattended, Evaluate medications/consider consulting pharmacy, Room close to nurse's station, Utilize gait belt for transfer/ambulation, Assess for delayed presentation/identification of injury for 48 hrs (comment for end date), Vital signs minimum Q4HRs X 24 hrs (comment for end date)         Problem: Patient Education: Go to Patient Education Activity  Goal: Patient/Family Education  Outcome: Progressing Towards Goal     Problem: Patient Education: Go to Patient Education Activity  Goal: Patient/Family Education  Outcome: Progressing Towards Goal     Problem: Patient Education: Go to Patient Education Activity  Goal: Patient/Family Education  Outcome: Progressing Towards Goal     Problem: Pain  Goal: *Control of Pain  Outcome: Progressing Towards Goal  Goal: *PALLIATIVE CARE:  Alleviation of Pain  Outcome: Progressing Towards Goal     Problem: Patient Education: Go to Patient Education Activity  Goal: Patient/Family Education  Outcome: Progressing Towards Goal

## 2020-10-18 NOTE — ROUTINE PROCESS
Bedside and Verbal shift change report given to [address] (oncoming nurse) by [facility] (offgoing nurse). Report included the following information SBAR, Kardex, Intake/Output, MAR and Recent Results.

## 2020-10-19 ENCOUNTER — HOSPITAL ENCOUNTER (OUTPATIENT)
Dept: INTERVENTIONAL RADIOLOGY/VASCULAR | Age: 80
Discharge: HOME | DRG: 517 | End: 2020-10-19
Attending: HOSPITALIST
Payer: MEDICARE

## 2020-10-19 PROCEDURE — 77030030399

## 2020-10-19 PROCEDURE — 2709999900 HC NON-CHARGEABLE SUPPLY

## 2020-10-19 PROCEDURE — 77030034843 HC TAMP SPN BN INFL EXP II KYPH -H

## 2020-10-19 PROCEDURE — 74011250637 HC RX REV CODE- 250/637: Performed by: HOSPITALIST

## 2020-10-19 PROCEDURE — 77030040175 HC TAMP SPN BN INFLT KYPH MEDT -I1

## 2020-10-19 PROCEDURE — 0QS03ZZ REPOSITION LUMBAR VERTEBRA, PERCUTANEOUS APPROACH: ICD-10-PCS | Performed by: STUDENT IN AN ORGANIZED HEALTH CARE EDUCATION/TRAINING PROGRAM

## 2020-10-19 PROCEDURE — 77030003666 HC NDL SPINAL BD -A

## 2020-10-19 PROCEDURE — 74011250637 HC RX REV CODE- 250/637: Performed by: INTERNAL MEDICINE

## 2020-10-19 PROCEDURE — 0QU03JZ SUPPLEMENT LUMBAR VERTEBRA WITH SYNTHETIC SUBSTITUTE, PERCUTANEOUS APPROACH: ICD-10-PCS | Performed by: STUDENT IN AN ORGANIZED HEALTH CARE EDUCATION/TRAINING PROGRAM

## 2020-10-19 PROCEDURE — 74011000636 HC RX REV CODE- 636: Performed by: STUDENT IN AN ORGANIZED HEALTH CARE EDUCATION/TRAINING PROGRAM

## 2020-10-19 PROCEDURE — 22514 PERQ VERTEBRAL AUGMENTATION: CPT

## 2020-10-19 PROCEDURE — 74011250636 HC RX REV CODE- 250/636: Performed by: STUDENT IN AN ORGANIZED HEALTH CARE EDUCATION/TRAINING PROGRAM

## 2020-10-19 PROCEDURE — 65270000029 HC RM PRIVATE

## 2020-10-19 PROCEDURE — 87635 SARS-COV-2 COVID-19 AMP PRB: CPT

## 2020-10-19 PROCEDURE — 74011250636 HC RX REV CODE- 250/636: Performed by: HOSPITALIST

## 2020-10-19 PROCEDURE — C1713 ANCHOR/SCREW BN/BN,TIS/BN: HCPCS

## 2020-10-19 PROCEDURE — 74011000250 HC RX REV CODE- 250: Performed by: STUDENT IN AN ORGANIZED HEALTH CARE EDUCATION/TRAINING PROGRAM

## 2020-10-19 PROCEDURE — 74011250636 HC RX REV CODE- 250/636: Performed by: INTERNAL MEDICINE

## 2020-10-19 PROCEDURE — 99152 MOD SED SAME PHYS/QHP 5/>YRS: CPT

## 2020-10-19 RX ORDER — ZINC GLUCONATE 10 MG
250 LOZENGE ORAL
COMMUNITY

## 2020-10-19 RX ORDER — LIDOCAINE HYDROCHLORIDE 20 MG/ML
30 INJECTION, SOLUTION INFILTRATION; PERINEURAL ONCE
Status: COMPLETED | OUTPATIENT
Start: 2020-10-19 | End: 2020-10-19

## 2020-10-19 RX ORDER — MIDAZOLAM HYDROCHLORIDE 1 MG/ML
5 INJECTION, SOLUTION INTRAMUSCULAR; INTRAVENOUS
Status: DISCONTINUED | OUTPATIENT
Start: 2020-10-19 | End: 2020-10-19

## 2020-10-19 RX ORDER — FENTANYL CITRATE 50 UG/ML
100 INJECTION, SOLUTION INTRAMUSCULAR; INTRAVENOUS
Status: DISCONTINUED | OUTPATIENT
Start: 2020-10-19 | End: 2020-10-19

## 2020-10-19 RX ORDER — HEPARIN SODIUM 200 [USP'U]/100ML
400 INJECTION, SOLUTION INTRAVENOUS ONCE
Status: COMPLETED | OUTPATIENT
Start: 2020-10-19 | End: 2020-10-19

## 2020-10-19 RX ORDER — UREA 10 %
800 LOTION (ML) TOPICAL DAILY
COMMUNITY

## 2020-10-19 RX ORDER — SODIUM CHLORIDE 9 MG/ML
25 INJECTION, SOLUTION INTRAVENOUS CONTINUOUS
Status: DISCONTINUED | OUTPATIENT
Start: 2020-10-19 | End: 2020-10-19

## 2020-10-19 RX ORDER — BISMUTH SUBSALICYLATE 262 MG
1 TABLET,CHEWABLE ORAL DAILY
COMMUNITY

## 2020-10-19 RX ORDER — DIPHENHYDRAMINE HYDROCHLORIDE 50 MG/ML
25 INJECTION, SOLUTION INTRAMUSCULAR; INTRAVENOUS ONCE
Status: COMPLETED | OUTPATIENT
Start: 2020-10-19 | End: 2020-10-19

## 2020-10-19 RX ORDER — AMLODIPINE BESYLATE 5 MG/1
10 TABLET ORAL DAILY
Status: DISCONTINUED | OUTPATIENT
Start: 2020-10-20 | End: 2020-10-21 | Stop reason: HOSPADM

## 2020-10-19 RX ORDER — BUPIVACAINE HYDROCHLORIDE 5 MG/ML
20 INJECTION, SOLUTION EPIDURAL; INTRACAUDAL ONCE
Status: COMPLETED | OUTPATIENT
Start: 2020-10-19 | End: 2020-10-19

## 2020-10-19 RX ADMIN — LIDOCAINE HYDROCHLORIDE 200 MG: 20 INJECTION, SOLUTION INFILTRATION; PERINEURAL at 15:00

## 2020-10-19 RX ADMIN — FENTANYL CITRATE 25 MCG: 50 INJECTION, SOLUTION INTRAMUSCULAR; INTRAVENOUS at 14:44

## 2020-10-19 RX ADMIN — FENTANYL CITRATE 25 MCG: 50 INJECTION, SOLUTION INTRAMUSCULAR; INTRAVENOUS at 14:58

## 2020-10-19 RX ADMIN — CITALOPRAM HYDROBROMIDE 20 MG: 20 TABLET ORAL at 17:15

## 2020-10-19 RX ADMIN — Medication 10 ML: at 17:15

## 2020-10-19 RX ADMIN — AMLODIPINE BESYLATE 5 MG: 5 TABLET ORAL at 09:50

## 2020-10-19 RX ADMIN — MIDAZOLAM HYDROCHLORIDE 1 MG: 1 INJECTION, SOLUTION INTRAMUSCULAR; INTRAVENOUS at 14:50

## 2020-10-19 RX ADMIN — DIPHENHYDRAMINE HYDROCHLORIDE 25 MG: 50 INJECTION, SOLUTION INTRAMUSCULAR; INTRAVENOUS at 13:59

## 2020-10-19 RX ADMIN — CARVEDILOL 6.25 MG: 6.25 TABLET, FILM COATED ORAL at 17:15

## 2020-10-19 RX ADMIN — POLYETHYLENE GLYCOL 3350 17 G: 17 POWDER, FOR SOLUTION ORAL at 17:15

## 2020-10-19 RX ADMIN — TRAZODONE HYDROCHLORIDE 25 MG: 50 TABLET ORAL at 21:00

## 2020-10-19 RX ADMIN — Medication 10 ML: at 21:00

## 2020-10-19 RX ADMIN — HEPARIN SODIUM IN SODIUM CHLORIDE 200 UNITS: 200 INJECTION INTRAVENOUS at 15:00

## 2020-10-19 RX ADMIN — ATORVASTATIN CALCIUM 10 MG: 10 TABLET, FILM COATED ORAL at 21:00

## 2020-10-19 RX ADMIN — TRAMADOL HYDROCHLORIDE 50 MG: 50 TABLET ORAL at 17:15

## 2020-10-19 RX ADMIN — IOPAMIDOL 15 ML: 612 INJECTION, SOLUTION INTRATHECAL at 15:00

## 2020-10-19 RX ADMIN — CARVEDILOL 6.25 MG: 6.25 TABLET, FILM COATED ORAL at 09:50

## 2020-10-19 RX ADMIN — PENTOXIFYLLINE 400 MG: 400 TABLET, EXTENDED RELEASE ORAL at 18:00

## 2020-10-19 RX ADMIN — BUPIVACAINE HYDROCHLORIDE 100 MG: 5 INJECTION, SOLUTION EPIDURAL; INTRACAUDAL; PERINEURAL at 15:00

## 2020-10-19 RX ADMIN — FENOFIBRATE 145 MG: 145 TABLET ORAL at 21:00

## 2020-10-19 RX ADMIN — SODIUM CHLORIDE 25 ML/HR: 900 INJECTION, SOLUTION INTRAVENOUS at 13:35

## 2020-10-19 RX ADMIN — MORPHINE SULFATE 1 MG: 2 INJECTION, SOLUTION INTRAMUSCULAR; INTRAVENOUS at 08:27

## 2020-10-19 RX ADMIN — MIDAZOLAM HYDROCHLORIDE 1 MG: 1 INJECTION, SOLUTION INTRAMUSCULAR; INTRAVENOUS at 14:58

## 2020-10-19 RX ADMIN — CEFAZOLIN SODIUM 2 G: 1 INJECTION, POWDER, FOR SOLUTION INTRAMUSCULAR; INTRAVENOUS at 13:53

## 2020-10-19 RX ADMIN — FENTANYL CITRATE 25 MCG: 50 INJECTION, SOLUTION INTRAMUSCULAR; INTRAVENOUS at 14:49

## 2020-10-19 RX ADMIN — MORPHINE SULFATE 1 MG: 2 INJECTION, SOLUTION INTRAMUSCULAR; INTRAVENOUS at 20:58

## 2020-10-19 NOTE — PROGRESS NOTES
PT visit attempted pt off floor for kyphoplasty. Will defer at this time and continue to follow. This is really only for people living with some one who has the flu, not just exposure in the past.    I would not recommend tamiflu for this situation

## 2020-10-19 NOTE — PROGRESS NOTES
LYNDSEY  1) home vs rehab pending progress post kyphoplasty     Reason for Admission: lumbar vertebral fracture                      RUR Score: 13%                    Plan for utilizing home health: per recommendation         PCP: First and Last name: Nathaniel Hopkins    Name of Practice: unable to recall    Are you a current patient: Yes/No: yes   Approximate date of last visit: Within the last month     Can you participate in a virtual visit with your PCP: no                     Current Advanced Directive/Advance Care Plan: not with patient at this time                         Transition of Care Plan:                      CM made room visit with patient who was alert and oriented but very [place]. Pt confirmed demographics, insurance, and emergency contact on file. Pt lives with her daughter. Daughter works outside of home. Pt has a cane and RW, uses RW for ambulation. Independent with ADLs. Has used HH in the past but unable to recall name of agency. Denied hx of rehab. Plan for d/c will be HH vs rehab pending progress post kyphoplasty. Care Management Interventions  PCP Verified by CM: Yes(last seen within the last month )  Mode of Transport at Discharge:  Other (see comment)  Transition of Care Consult (CM Consult): Discharge Planning  Discharge Durable Medical Equipment: No  Physical Therapy Consult: Yes  Occupational Therapy Consult: Yes  Speech Therapy Consult: No  Current Support Network: Relative's Home(pt lives with daughter)  Confirm Follow Up Transport: Family  Discharge Location  Discharge Placement: Unable to determine at this time    Barbara Alberto, [address]

## 2020-10-19 NOTE — PROGRESS NOTES
Orthopedic End of Shift Note Bedside and Verbal shift change report given to *** (oncoming nurse) by *** (offgoing nurse). Report included the following information {SBAR REPORTS ZKE}. POD# 0 Significant issues during shift: pain Issues for Physician to address: none Activity This Shift 
(check all that apply) [] chair 
[] dangle 
 [] bathroom 
[] bedside commode [] hallway 
[] bedrest  
Nausea/Vomiting [] yes [x] no    
Voiding Status [x] void [] [place] [] I&O Cath Bowel Movements [] yes [x] no Foot Pumps or SCD [] yes [x] no Ice Pack [] yes    [x] no Incentive Spirometer [] yes [x] no Volume:    
Telemetry Monitoring   [] yes [x] no Rhythm:  
Supplemental O2 [] yes [x] no Sat off O2:   98%

## 2020-10-19 NOTE — PROGRESS NOTES
Hospitalist Progress Note    NAME: Marin Tiwari   :  1940   MRN:  [id number]     Subjective:   Daily Progress Note: 10/19/2020 9:54 AM    Chief complaint: admitted with back pain  Case d/w staff she is c/o pain in back. Pain control has improved. Very hard of hearing. Daughter has opted for kyphoplasty.     Current Facility-Administered Medications   Medication Dose Route Frequency    [START ON 10/20/2020] amLODIPine (NORVASC) tablet 10 mg  10 mg Oral DAILY    morphine injection 1 mg  1 mg IntraVENous Q4H PRN    carvediloL (COREG) tablet 6.25 mg  6.25 mg Oral BID WITH MEALS    meclizine (ANTIVERT) tablet 6.25 mg  6.25 mg Oral TID PRN    atorvastatin (LIPITOR) tablet 10 mg  10 mg Oral QHS    ferrous sulfate tablet 325 mg  325 mg Oral ACB    citalopram (CELEXA) tablet 20 mg  20 mg Oral QPM    traMADoL (ULTRAM) tablet 50 mg  50 mg Oral Q8H PRN    traZODone (DESYREL) tablet 25 mg  25 mg Oral QHS    ascorbic acid (vitamin C) (VITAMIN C) tablet 500 mg  500 mg Oral DAILY    fenofibrate nanocrystallized (TRICOR) tablet 145 mg  145 mg Oral QHS    pentoxifylline CR (TRENTAL) tablet 400 mg  400 mg Oral BID    hydrALAZINE (APRESOLINE) 20 mg/mL injection 10 mg  10 mg IntraVENous Q6H PRN    sodium chloride (NS) flush 5-40 mL  5-40 mL IntraVENous Q8H    sodium chloride (NS) flush 5-40 mL  5-40 mL IntraVENous PRN    acetaminophen (TYLENOL) tablet 650 mg  650 mg Oral Q6H PRN    Or    acetaminophen (TYLENOL) suppository 650 mg  650 mg Rectal Q6H PRN    polyethylene glycol (MIRALAX) packet 17 g  17 g Oral DAILY PRN    promethazine (PHENERGAN) tablet 12.5 mg  12.5 mg Oral Q6H PRN    Or    ondansetron (ZOFRAN) injection 4 mg  4 mg IntraVENous Q6H PRN    enoxaparin (LOVENOX) injection 40 mg  40 mg SubCUTAneous DAILY    naloxone (NARCAN) injection 0.4 mg  0.4 mg IntraVENous EVERY 2 MINUTES AS NEEDED    0.9% sodium chloride infusion  25 mL/hr IntraVENous CONTINUOUS          Objective:     Visit Vitals  BP (!) 169/68   Pulse 61   Temp 98.6 °F (37 °C)   Resp 16   Ht 5' 2\" (1.575 m)   Wt 73 kg (161 lb)   SpO2 96%   BMI 29.45 kg/m²      O2 Device: Room air    Temp (24hrs), Av.4 °F (36.9 °C), Min:98.1 °F (36.7 °C), Max:98.6 °F (37 °C)        Physical Exam:    Gen:   in no acute distress  HEENT:    hearing intact to voice, moist mucous membranes  Neck: Supple  Resp: No accessory muscle use, clear breath sounds without wheezes rales or rhonchi  Card: No murmurs, normal S1, S2 without thrills or peripheral edema  Abd:   Soft, non-tender, non-distended   Musc: No cyanosis or clubbing  Skin: No rashes or ulcers, skin turgor is good  Neuro:     follows commands appropriately  Psych:   flat affecct      Data Review    No results found for this or any previous visit (from the past 24 hour(s)). No results found for this visit on 10/14/20. All Micro Results     None      MRI L spine  Acute vertebral compression deformities at L1 and L5 with less than 50% loss  vertebral body height. No significant cortical retropulsion or epidural  hematoma. No significant extension into the posterior elements. Fractures are  amenable to percutaneous kyphoplasty as clinically warranted.        Interval severe canal stenosis at L1-L2 L2-L3 and L3-L4 progressed compared to  the prior examination. Additional less severe degenerative findings are stable and as described above. Radiology reports and films for the last 24 hours have been reviewed. Assessment/Plan:         Acute L1/L5 compression fracture  MRI L spine noted as above  Pain control - improved with current regimen - will need to eventually wean to PO however  Pt's daughter now has agreed for kyphoplasty - hopefully this can be done today   Will need PT/OT and placement afterwards  Will send screening SARS-CoV2 in anticipation for placement    Chr anemia- Hgb stable monitor periodically, transfuse if <7.     Hypertension - increased Norvasc dosage, PRN Hydralazine     Hyperlipidemia-continue statin     Depression-Continue home medication     Code Status: Full   Surrogate Decision Maker: daughter     DVT Prophylaxis: Lovenox   Dispo: TBD    Signed By: Stanislav Rust MD     October 19, 2020

## 2020-10-19 NOTE — ROUTINE PROCESS
1136- Telephone consent for kyphoplasty completed with daughter Jorge Luis Landers. Witnessed per [employer].

## 2020-10-19 NOTE — ROUTINE PROCESS
1330- Pt in for kyphoplasty. Workup completed. Equal  to BUE. Pt able to push equally with BLE. Denies pain at this time. 1330- Dr Donn Farmer in to consult pt.    9601- Pt in recovery. VSS. Pt sleeping but easily awakens. 1615- Pt turned on side. Don D/I to back. VSS. Report called to Imani Navarrete, primary nurse on unit. Orders and medications administered reviewed with pt. Pt with equal BUE  and pedal pushes same as preprocedure to BLE. Daughter called for update of pt status.

## 2020-10-20 LAB
HEALTH STATUS, XMCV2T: NORMAL
SARS-COV-2, COV2: NOT DETECTED
SOURCE, COVRS: NORMAL
SPECIMEN SOURCE, FCOV2M: NORMAL
SPECIMEN TYPE, XMCV1T: NORMAL

## 2020-10-20 PROCEDURE — 97530 THERAPEUTIC ACTIVITIES: CPT

## 2020-10-20 PROCEDURE — 74011250637 HC RX REV CODE- 250/637: Performed by: INTERNAL MEDICINE

## 2020-10-20 PROCEDURE — 97168 OT RE-EVAL EST PLAN CARE: CPT

## 2020-10-20 PROCEDURE — 97164 PT RE-EVAL EST PLAN CARE: CPT

## 2020-10-20 PROCEDURE — 74011250637 HC RX REV CODE- 250/637: Performed by: GENERAL ACUTE CARE HOSPITAL

## 2020-10-20 PROCEDURE — 74011250636 HC RX REV CODE- 250/636: Performed by: INTERNAL MEDICINE

## 2020-10-20 PROCEDURE — 74011250637 HC RX REV CODE- 250/637: Performed by: HOSPITALIST

## 2020-10-20 PROCEDURE — 65270000029 HC RM PRIVATE

## 2020-10-20 PROCEDURE — 97116 GAIT TRAINING THERAPY: CPT

## 2020-10-20 PROCEDURE — 74011250636 HC RX REV CODE- 250/636: Performed by: HOSPITALIST

## 2020-10-20 PROCEDURE — 97535 SELF CARE MNGMENT TRAINING: CPT

## 2020-10-20 RX ADMIN — Medication 10 ML: at 21:40

## 2020-10-20 RX ADMIN — PENTOXIFYLLINE 400 MG: 400 TABLET, EXTENDED RELEASE ORAL at 17:57

## 2020-10-20 RX ADMIN — TRAMADOL HYDROCHLORIDE 50 MG: 50 TABLET ORAL at 17:57

## 2020-10-20 RX ADMIN — MORPHINE SULFATE 1 MG: 2 INJECTION, SOLUTION INTRAMUSCULAR; INTRAVENOUS at 21:50

## 2020-10-20 RX ADMIN — Medication 10 ML: at 06:10

## 2020-10-20 RX ADMIN — ENOXAPARIN SODIUM 40 MG: 40 INJECTION SUBCUTANEOUS at 09:18

## 2020-10-20 RX ADMIN — HYDRALAZINE HYDROCHLORIDE 10 MG: 20 INJECTION INTRAMUSCULAR; INTRAVENOUS at 09:18

## 2020-10-20 RX ADMIN — CARVEDILOL 6.25 MG: 6.25 TABLET, FILM COATED ORAL at 09:18

## 2020-10-20 RX ADMIN — FENOFIBRATE 145 MG: 145 TABLET ORAL at 21:40

## 2020-10-20 RX ADMIN — FERROUS SULFATE TAB 325 MG (65 MG ELEMENTAL FE) 325 MG: 325 (65 FE) TAB at 09:18

## 2020-10-20 RX ADMIN — CARVEDILOL 6.25 MG: 6.25 TABLET, FILM COATED ORAL at 17:57

## 2020-10-20 RX ADMIN — MORPHINE SULFATE 1 MG: 2 INJECTION, SOLUTION INTRAMUSCULAR; INTRAVENOUS at 12:33

## 2020-10-20 RX ADMIN — CITALOPRAM HYDROBROMIDE 20 MG: 20 TABLET ORAL at 17:57

## 2020-10-20 RX ADMIN — TRAZODONE HYDROCHLORIDE 25 MG: 50 TABLET ORAL at 21:39

## 2020-10-20 RX ADMIN — ATORVASTATIN CALCIUM 10 MG: 10 TABLET, FILM COATED ORAL at 21:39

## 2020-10-20 RX ADMIN — PENTOXIFYLLINE 400 MG: 400 TABLET, EXTENDED RELEASE ORAL at 09:18

## 2020-10-20 RX ADMIN — Medication 10 ML: at 13:11

## 2020-10-20 RX ADMIN — OXYCODONE HYDROCHLORIDE AND ACETAMINOPHEN 500 MG: 500 TABLET ORAL at 09:18

## 2020-10-20 RX ADMIN — AMLODIPINE BESYLATE 10 MG: 5 TABLET ORAL at 09:18

## 2020-10-20 RX ADMIN — TRAMADOL HYDROCHLORIDE 50 MG: 50 TABLET ORAL at 09:18

## 2020-10-20 NOTE — PROGRESS NOTES
Bedside and Verbal shift change report given to [address] [place] [address] (oncoming nurse) by Wendy Rebolledo (offgoing nurse). Report included the following information SBAR, Kardex, Intake/Output, MAR, Accordion and Recent Results.

## 2020-10-20 NOTE — PROGRESS NOTES
Problem: Mobility Impaired (Adult and Pediatric)  Goal: *Acute Goals and Plan of Care (Insert Text)  Description: FUNCTIONAL STATUS PRIOR TO ADMISSION: Patient was modified independent using a rolling walker for functional mobility. Patient was modified independent for basic and instrumental ADLs. Patient with lack of activity since last Thursday due to severe pain; h/o GLFs with last GLF being in May/June. HOME SUPPORT PRIOR TO ADMISSION: The patient lived with family but did not require assist.    Physical Therapy Goals  Reveiwed 10/20/2020 new goals  1. Patient will move from supine to sit and sit to supine, scoot up and down, and roll side to side in bed with minimal assistance within 7 days. 2.  Patient will transfer from bed to chair and chair to bed with minimal assistance within 7 days. 3.  Patient will perform sit to stand with minimal assistance within 7 days. 4.  Patient will ambulate with minimal assistance x 50 feet with the least restrictive device within 7 days. Initiated 10/15/2020  1. Patient will move from supine to sit and sit to supine , scoot up and down, and roll side to side in bed with modified independence within 7 day(s). 2.  Patient will transfer from bed to chair and chair to bed with modified independence using the least restrictive device within 7 day(s). 3.  Patient will perform sit to stand with modified independence within 7 day(s). 4.  Patient will ambulate with modified independence for 150 feet with the least restrictive device within 7 day(s). 5.  Patient will ascend/descend 4 stairs with 1 handrail(s) with modified independence within 7 day(s).      Outcome: Progressing Towards Goal  PHYSICAL THERAPY REEVALUATION  Patient: Александр Bacon (2 y.o. female)  Date: 10/20/2020  Primary Diagnosis: Lumbar vertebral fracture ([facility] [address] [S32.009A]       Precautions:  Fall      ASSESSMENT  Based on the objective data described below, the patient presents with some improvement in mobility, but remains quite limited. She continues with generalized weakness, impaired balance, difficulty walking, pain, and decreased activity tolerance. Pt now s/p kyphoplasty post op day 1. Pt received supine in bed, agreeable to PT session. Pt able to move to sitting eob, stand at RW, transfer to [facility], stand from [facility] and ambulate a short distance to the bedside chair. Pt with a heavy posterior lean in standing and with ambulation, requiring moderate assist x 1. Pt will need rehab at discharge. Current Level of Function Impacting Discharge (mobility/balance):   Bed Mobility:  Rolling: Maximum assistance  Supine to Sit: Maximum assistance;Assist x2   Scooting: Maximum assistance;Assist x2  Transfers:  Sit to Stand: Contact guard assistance;Minimum assistance  Stand to Sit: Contact guard assistance   Bed to Chair: Contact guard assistance;Minimum assistance  Ambulation/Gait Training:  Distance (ft): 3 Feet (ft)  Assistive Device: Gait belt;Walker, rolling  Ambulation - Level of Assistance: Minimal assistance; Moderate assistance; Additional time;Assist x2    Functional Outcome Measure: The patient scored 35/100 on the Barthel Index outcome measure which is indicative of 65% impaired function/adls      Other factors to consider for discharge: functioning below her baseline     Patient will benefit from skilled therapy intervention to address the above noted impairments. PLAN :  Recommendations and Planned Interventions: bed mobility training, transfer training, gait training, patient and family training/education and therapeutic activities      Frequency/Duration: Patient will be followed by physical therapy:  5 times a week to address goals.     Recommendation for discharge: (in order for the patient to meet his/her long term goals)  Therapy up to 5 days/week in SNF setting    This discharge recommendation:  Has been made in collaboration with the attending provider and/or case management    Equipment recommendations for successful discharge (if) home: none         SUBJECTIVE:   Patient stated my back is itchy and it hurts.     OBJECTIVE DATA SUMMARY:   HISTORY:    Past Medical History:   Diagnosis Date    Chronic pain     hip, back    Depression     Diabetes ([address]     GERD (gastroesophageal reflux disease)     Hemorrhagic gastritis     10/2013    [place] (hard of hearing)     no hearing aides    Hyperlipemia     Hypertension     Stroke (cerebrum) ([address]     no residual    Stroke ([address]     No residual says patient     Past Surgical History:   Procedure Laterality Date    HX  SECTION  1979    HX CHOLECYSTECTOMY      IR KYPHOPLASTY LUMBAR  10/19/2020    IR KYPHOPLASTY LUMBAR  10/19/2020    [place] COLONOSCOPY FLX DX W/COLLJ SPEC WHEN PFRMD  2013         [place] ESOPHAGOGASTRODUODENOSCOPY TRANSORAL DIAGNOSTIC  10/10/2013          Hospital course since last seen and reason for reevaluation: initial PT evaluation 10/15/2020 and pt unable to ambulate, kyphoplasty 10/19/2020. Pt now able to transfer and ambulate a short distance with RW    Personal factors and/or comorbidities impacting plan of care: functioning below her baseline, pain    Home Situation  Home Environment: Private residence  # Steps to Enter: 4  Rails to Enter: Yes  Hand Rails : Left  One/Two Story Residence: One story  Living Alone: No  Support Systems: Child(diane)  Patient Expects to be Discharged to[de-identified] Private residence  Current DME Used/Available at Home: [address] [facility], [address], straight, Walker, rollator, Walker, rolling, Commode, bedside    EXAMINATION/PRESENTATION/DECISION MAKING:   Critical Behavior:  Neurologic State: Alert  Orientation Level: Appropriate for age  Cognition: Follows commands  Safety/Judgement: Fall prevention  Hearing:   Auditory  Auditory Impairment: Hard of hearing, bilateral  Range Of Motion:  AROM: Generally decreased, functional           PROM: Generally decreased, functional Strength:    Strength: Generally decreased, functional                    Tone & Sensation:   Tone: Normal              Sensation: Intact               Coordination:  Coordination: Within functional limits       Functional Mobility:  Bed Mobility:  Rolling: Maximum assistance  Supine to Sit: Maximum assistance;Assist x2     Scooting: Maximum assistance;Assist x2  Transfers:  Sit to Stand: Contact guard assistance;Minimum assistance  Stand to Sit: Contact guard assistance        Bed to Chair: Contact guard assistance;Minimum assistance              Balance:   Sitting: Intact  Sitting - Static: Good (unsupported)  Sitting - Dynamic: Not tested  Standing: Impaired; With support  Standing - Static: Constant support; Fair  Standing - Dynamic : Constant support; Fair  Ambulation/Gait Training:  Distance (ft): 3 Feet (ft)  Assistive Device: Gait belt;Walker, rolling  Ambulation - Level of Assistance: Minimal assistance; Moderate assistance; Additional time;Assist x2     Gait Description (WDL): Exceptions to WDL  Gait Abnormalities: Antalgic;Decreased step clearance; Step to gait        Base of Support: Widened     Speed/Annalise: Slow  Step Length: Left shortened;Right shortened        Functional Measure:  Barthel Index:    Bathin  Bladder: 5  Bowels: 5  Groomin  Dressin  Feeding: 10  Mobility: 0  Stairs: 0  Toilet Use: 5  Transfer (Bed to Chair and Back): 5  Total: 35/100       The Barthel ADL Index: Guidelines  1. The index should be used as a record of what a patient does, not as a record of what a patient could do. 2. The main aim is to establish degree of independence from any help, physical or verbal, however minor and for whatever reason. 3. The need for supervision renders the patient not independent. 4. A patient's performance should be established using the best available evidence.  Asking the patient, friends/relatives and nurses are the usual sources, but direct observation and common sense are also important. However direct testing is not needed. 5. Usually the patient's performance over the preceding 24-48 hours is important, but occasionally longer periods will be relevant. 6. Middle categories imply that the patient supplies over 50 per cent of the effort. 7. Use of aids to be independent is allowed. Lew Watson, Barthel, D.W. (4646). Functional evaluation: the Barthel Index. [facility] (14)2. David Denson solomon PATRICK Quispe, Priya Frederick, Jose Dsouza., Rufe, [address] (1999). Measuring the change indisability after inpatient rehabilitation; comparison of the responsiveness of the Barthel Index and Functional Somers Measure. Journal of Neurology, Neurosurgery, and Psychiatry, 66(4), 905-047. BOBO Smith, SETELLE Cool, & Galilea Abraham M.A. (2004.) Assessment of post-stroke quality of life in cost-effectiveness studies: The usefulness of the Barthel Index and the EuroQoL-5D. Quality of Life Research, 13, 427-43     Pain Rating:  Pt didn't rate the pain, but reported back pain      After treatment patient left in no apparent distress:   Sitting in chair and Call bell within reach    COMMUNICATION/EDUCATION:   The patients plan of care was discussed with: Occupational therapist, Registered nurse and Case management. Fall prevention education was provided and the patient/caregiver indicated understanding., Patient/family have participated as able in goal setting and plan of care. and Patient/family agree to work toward stated goals and plan of care.     Thank you for this referral.  Reji Moran, PT   Time Calculation: 32 mins

## 2020-10-20 NOTE — PROGRESS NOTES
Problem: Pressure Injury - Risk of  Goal: *Prevention of pressure injury  Description: Document Zachary Scale and appropriate interventions in the flowsheet. Outcome: Progressing Towards Goal  Note: Pressure Injury Interventions:  Sensory Interventions: Assess changes in LOC, Assess need for specialty bed, Avoid rigorous massage over bony prominences, Check visual cues for pain, Discuss PT/OT consult with provider, Chair cushion, Keep linens dry and wrinkle-free, Float heels, Minimize linen layers, Maintain/enhance activity level    Moisture Interventions: Check for incontinence Q2 hours and as needed, Absorbent underpads    Activity Interventions: Pressure redistribution bed/mattress(bed type), Increase time out of bed    Mobility Interventions: Float heels, Turn and reposition approx. every two hours(pillow and wedges)    Nutrition Interventions: Document food/fluid/supplement intake    Friction and Shear Interventions: HOB 30 degrees or less, Lift sheet, Minimize layers                Problem: Patient Education: Go to Patient Education Activity  Goal: Patient/Family Education  Outcome: Progressing Towards Goal     Problem: Falls - Risk of  Goal: *Absence of Falls  Description: Document Oscar Fall Risk and appropriate interventions in the flowsheet.   Outcome: Progressing Towards Goal  Note: Fall Risk Interventions:  Mobility Interventions: Communicate number of staff needed for ambulation/transfer, Patient to call before getting OOB         Medication Interventions: Patient to call before getting OOB, Evaluate medications/consider consulting pharmacy    Elimination Interventions: Call light in reach    History of Falls Interventions: Door open when patient unattended, Evaluate medications/consider consulting pharmacy, Investigate reason for fall         Problem: Patient Education: Go to Patient Education Activity  Goal: Patient/Family Education  Outcome: Progressing Towards Goal     Problem: Patient Education: Go to Patient Education Activity  Goal: Patient/Family Education  Outcome: Progressing Towards Goal     Problem: Patient Education: Go to Patient Education Activity  Goal: Patient/Family Education  Outcome: Progressing Towards Goal     Problem: Pain  Goal: *Control of Pain  Outcome: Progressing Towards Goal  Goal: *PALLIATIVE CARE:  Alleviation of Pain  Outcome: Progressing Towards Goal     Problem: Patient Education: Go to Patient Education Activity  Goal: Patient/Family Education  Outcome: Progressing Towards Goal

## 2020-10-20 NOTE — PROGRESS NOTES
Pt post kypho yesterday. Still c/o back pain but better on th eleft lower. No pt yet.     Patient Vitals for the past 24 hrs:   Temp Pulse Resp BP SpO2   10/20/20 0823 98.8 °F (37.1 °C) 60 20 (!) 172/72 97 %   10/20/20 0431 99.1 °F (37.3 °C) 62 24 (!) 157/66 93 %   10/19/20 2047 99 °F (37.2 °C) (!) 55 (!) 34 (!) 162/49 95 %   10/19/20 1752 98 °F (36.7 °C) 61 14 (!) 141/65 95 %   10/19/20 1658 98 °F (36.7 °C) (!) 57 16 (!) 158/62 94 %   10/19/20 1615 -- (!) 54 16 (!) 162/51 94 %   10/19/20 1600 -- (!) 56 17 (!) 167/60 95 %   10/19/20 1545 -- (!) 57 16 135/62 98 %   10/19/20 1530 -- (!) 56 17 134/60 97 %   10/19/20 1525 -- (!) 57 17 (!) 135/57 99 %   10/19/20 1520 -- (!) 56 18 (!) 134/56 98 %   10/19/20 1515 -- (!) 55 17 (!) 133/58 100 %   10/19/20 1510 -- (!) 55 16 (!) 142/51 100 %   10/19/20 1505 -- (!) 54 12 (!) 123/51 100 %   10/19/20 1500 -- (!) 55 14 (!) 124/52 100 %   10/19/20 1455 -- (!) 53 13 (!) 122/44 100 %   10/19/20 1450 -- (!) 58 14 (!) 146/50 100 %   10/19/20 1445 -- (!) 55 17 (!) 137/42 100 %   10/19/20 1444 -- (!) 54 14 (!) 145/55 100 %   10/19/20 1325 -- (!) 55 18 (!) 156/53 100 %   10/19/20 1241 98.3 °F (36.8 °C) (!) 56 17 (!) 150/45 95 %     TTP lower back  '  Pt-  Pain management  wbat

## 2020-10-20 NOTE — PROGRESS NOTES
Bedside shift change report given to Roderick Maurer RN (oncoming nurse) by Jah Gabriel RN (offgoing nurse). Report included the following information SBAR, Kardex, Procedure Summary, Intake/Output, MAR, Accordion and Recent Results.

## 2020-10-20 NOTE — PROGRESS NOTES
Hospitalist Progress Note    NAME: Dali Lacy   :  1940   MRN:  [id number]     Subjective:   Daily Progress Note: 10/20/2020 9:54 AM    Chief complaint: admitted with back pain  Patient very hard of hearing, reports her back pain on the left side has improved.   Awaiting PT assessment    Current Facility-Administered Medications   Medication Dose Route Frequency    amLODIPine (NORVASC) tablet 10 mg  10 mg Oral DAILY    morphine injection 1 mg  1 mg IntraVENous Q4H PRN    carvediloL (COREG) tablet 6.25 mg  6.25 mg Oral BID WITH MEALS    meclizine (ANTIVERT) tablet 6.25 mg  6.25 mg Oral TID PRN    atorvastatin (LIPITOR) tablet 10 mg  10 mg Oral QHS    ferrous sulfate tablet 325 mg  325 mg Oral ACB    citalopram (CELEXA) tablet 20 mg  20 mg Oral QPM    traMADoL (ULTRAM) tablet 50 mg  50 mg Oral Q8H PRN    traZODone (DESYREL) tablet 25 mg  25 mg Oral QHS    ascorbic acid (vitamin C) (VITAMIN C) tablet 500 mg  500 mg Oral DAILY    fenofibrate nanocrystallized (TRICOR) tablet 145 mg  145 mg Oral QHS    pentoxifylline CR (TRENTAL) tablet 400 mg  400 mg Oral BID    hydrALAZINE (APRESOLINE) 20 mg/mL injection 10 mg  10 mg IntraVENous Q6H PRN    sodium chloride (NS) flush 5-40 mL  5-40 mL IntraVENous Q8H    sodium chloride (NS) flush 5-40 mL  5-40 mL IntraVENous PRN    acetaminophen (TYLENOL) tablet 650 mg  650 mg Oral Q6H PRN    Or    acetaminophen (TYLENOL) suppository 650 mg  650 mg Rectal Q6H PRN    polyethylene glycol (MIRALAX) packet 17 g  17 g Oral DAILY PRN    promethazine (PHENERGAN) tablet 12.5 mg  12.5 mg Oral Q6H PRN    Or    ondansetron (ZOFRAN) injection 4 mg  4 mg IntraVENous Q6H PRN    enoxaparin (LOVENOX) injection 40 mg  40 mg SubCUTAneous DAILY    naloxone (NARCAN) injection 0.4 mg  0.4 mg IntraVENous EVERY 2 MINUTES AS NEEDED    0.9% sodium chloride infusion  25 mL/hr IntraVENous CONTINUOUS          Objective:     Visit Vitals  BP (!) 172/72   Pulse 60   Temp 98.8 °F (37.1 °C)   Resp 20   Ht 5' 2\" (1.575 m)   Wt 73 kg (161 lb)   SpO2 97%   BMI 29.45 kg/m²    O2 Flow Rate (L/min): 2 l/min O2 Device: Room air    Temp (24hrs), Av.5 °F (36.9 °C), Min:98 °F (36.7 °C), Max:99.1 °F (37.3 °C)        Physical Exam:    Gen:   in no acute distress  HEENT:    hearing intact to voice, moist mucous membranes  Neck: Supple  Resp: No accessory muscle use, clear breath sounds without wheezes rales or rhonchi  Card: No murmurs, normal S1, S2 without thrills or peripheral edema  Abd:   Soft, non-tender, non-distended   Musc: No cyanosis or clubbing  Skin: No rashes or ulcers, skin turgor is good  Neuro:     follows commands appropriately  Psych:   flat affecct      Data Review    Recent Results (from the past 24 hour(s))   SARS-COV-2    Collection Time: 10/19/20 12:59 PM   Result Value Ref Range    Specimen source Nasopharyngeal      SARS-CoV-2 PENDING     SARS-CoV-2 PENDING     Specimen source Nasopharyngeal      COVID-19 rapid test PENDING     Specimen type NP Swab      Health status PENDING     COVID-19 PENDING      No results found for this visit on 10/14/20. All Micro Results     None      MRI L spine  Acute vertebral compression deformities at L1 and L5 with less than 50% loss  vertebral body height. No significant cortical retropulsion or epidural  hematoma. No significant extension into the posterior elements. Fractures are  amenable to percutaneous kyphoplasty as clinically warranted.        Interval severe canal stenosis at L1-L2 L2-L3 and L3-L4 progressed compared to  the prior examination. Additional less severe degenerative findings are stable and as described above. Radiology reports and films for the last 24 hours have been reviewed.     Assessment/Plan:         Acute L1/L5 compression fracture  S/p L1 and L5 kyphoplasty 10/19   MRI L spine noted as above  Pain control - improved with current regimen - will need to eventually wean to PO however  -awaiting PT and OT assessment   pending SARS-CoV2 in anticipation for placement    Chr anemia- Hgb stable monitor periodically, transfuse if <7. Hypertension - increased Norvasc dosage, PRN Hydralazine     Hyperlipidemia-continue statin     Depression-Continue home medication     Code Status: Full   Surrogate Decision Maker: daughter     DVT Prophylaxis: Lovenox   Dispo: TBD  Likely plan for home with home health versus rehab depending on PT recommendation.      Signed By: Ankit Mejias MD     October 20, 2020

## 2020-10-20 NOTE — PROGRESS NOTES
LYNDSEY  1) SNF- referral sent to [facility]  2) will likely need BLS transportation at d/c  3) COVID test pending     CM spoke with therapy who are recommending SNF for patient at d/c. CM made room visit with patient who agreed she is in need of rehab to ensure a safe d/c. CM provided patient with a SNF list. Pt reported she would like a facility in [place]. CM informed patient of all SNFs in [facility]. CM encouraged pt to reach out to family to discuss rehab options. Pt reported she was unable to do so at the time as her family is at work. Pt expressed interest in [facility]. CM agreed to send referral to [address] [place] [address]. FOC signed and on bedside chart. Referral sent to [address] [place] [address] and waiting for response.      Barbara Pepper, [facility]

## 2020-10-20 NOTE — PROGRESS NOTES
Problem: Self Care Deficits Care Plan (Adult)  Goal: *Acute Goals and Plan of Care (Insert Text)  Description:   FUNCTIONAL STATUS PRIOR TO ADMISSION: Patient was modified independent using a single point cane for functional mobility. HOME SUPPORT: The patient lived with family and had not needed assist up until last week after her back had been hurting. .    Occupational Therapy Goals  Re- eavl 10/20/20 and goals remain the same  Initiated 10/15/2020  1. Patient will perform grooming with supervision/set-up within 7 day(s). 2.  Patient will perform bathing with moderate assistance  within 7 day(s). 3.  Patient will perform lower body dressing with maximal assistance within 7 day(s). 4.  Patient will perform toilet transfers with maximal assistance within 7 day(s). 5.  Patient will perform all aspects of toileting with minimal assistance/contact guard assist within 7 day(s). 6.  Patient will participate in upper extremity therapeutic exercise/activities with minimal assistance/contact guard assist for 5 minutes within 7 day(s). 7.  Patient will utilize energy conservation techniques during functional activities with verbal cues within 7 day(s). Outcome: Progressing Towards Goal   OCCUPATIONAL THERAPY RE-EVALUATION  Patient: Keara Neff (74 y.o. female)  Date: 10/20/2020  Diagnosis: Lumbar vertebral fracture ([facility] [address] [S32.009A]   <principal problem not specified>       Precautions: Fall  Chart, occupational therapy assessment, plan of care, and goals were reviewed. ASSESSMENT  Based on the objective data described below, pt was supine in bed an was max of 2 for bed mobility , due to pain and appears that pt did not understand what was asked of her for log rolling. Pt appears to have some difficulty with understanding directions and she is [facility] and feel this may be part of the problem. Pt was able  to sit on EOb with no balance issues and was max of 2 for scooting to EOB.    Pt was min of 2 to stand and min to CGA for transfer to chair and BSC. Pt continues to state that her pain was increasing and pain was 5 when she sat down in chair. Pt was left with her lunch and she stated that she did not care to eat. Pt was left up in chair and told pt to sit up for 30 minutes to one hour and ask nursing to get her back to bed. Current Level of Function Impacting Discharge (ADLs): decreased endurance, strength, functional mobility, ADLs and pain in bed              PLAN :  Recommendations and Planned Interventions: self care training, functional mobility training, therapeutic exercise, balance training, therapeutic activities, endurance activities, patient education, and home safety training    Frequency/Duration: Patient will be followed by occupational therapy 4 times a week to address goals. Recommend with staff: Caroline Galarza for meals and use BSC    Recommend next OT session: work on standing endurance, and sitting in chair for grooming. Recommendation for discharge: (in order for the patient to meet his/her long term goals)  Therapy up to 5 days/week in SNF setting    This discharge recommendation:  Has been made in collaboration with the attending provider and/or case management    Equipment recommendations for successful discharge (if) home: tbd       SUBJECTIVE:   Patient stated How long do I need to sit up? Miguel Abraham    OBJECTIVE DATA SUMMARY:   Hospital course since last seen and reason for reevaluation: pt lives with family and had a kyphoplasty 10/19/20 and is being re eval     Cognitive/Behavioral Status:  Neurologic State: Alert  Orientation Level: Oriented to person;Oriented to place;Oriented to situation  Cognition: Follows commands;Memory loss  Perception: Appears intact  Perseveration: Perseverates during conversation  Safety/Judgement: Fall prevention    Skin: in fair health     Edema: none     Hearing:   Auditory  Auditory Impairment: Hard of hearing, bilateral    Vision/Perceptual: Intact                  Range of Motion:    AROM: Generally decreased, functional  PROM: Generally decreased, functional                      Strength:    Strength: Generally decreased, functional                Coordination:  Coordination: Within functional limits  Fine Motor Skills-Upper: Left Intact; Right Intact    Gross Motor Skills-Upper: Left Intact; Right Intact    Tone & Sensation:    Tone: Normal  Sensation: Intact                        Functional Mobility and Transfers for ADLs:  Bed Mobility:  Rolling: Maximum assistance  Supine to Sit: Maximum assistance;Assist x2  Scooting: Maximum assistance;Assist x2    Transfers:  Sit to Stand: Contact guard assistance;Minimum assistance  Functional Transfers  Toilet Transfer : Contact guard assistance;Minimum assistance;Assist x2  Bed to Chair: Contact guard assistance;Minimum assistance    Balance:  Sitting: Intact  Sitting - Static: Good (unsupported)  Sitting - Dynamic: Not tested  Standing: Impaired; With support  Standing - Static: Constant support; Fair  Standing - Dynamic : Constant support; Fair    ADL Assessment:  Feeding: Setup    Oral Facial Hygiene/Grooming: Setup    Bathing: Maximum assistance; Moderate assistance    Upper Body Dressing: Minimum assistance;Setup    Lower Body Dressing: Maximum assistance;Minimum assistance    Toileting: Maximum assistance;Minimum assistance                Cognitive Retraining  Safety/Judgement: Fall prevention          Functional Measure:  Barthel Index:    Bathin  Bladder: 5  Bowels: 5  Groomin  Dressin  Feeding: 10  Mobility: 0  Stairs: 0  Toilet Use: 5  Transfer (Bed to Chair and Back): 5  Total: 35/100        The Barthel ADL Index: Guidelines  1. The index should be used as a record of what a patient does, not as a record of what a patient could do. 2. The main aim is to establish degree of independence from any help, physical or verbal, however minor and for whatever reason.   3. The need for supervision renders the patient not independent. 4. A patient's performance should be established using the best available evidence. Asking the patient, friends/relatives and nurses are the usual sources, but direct observation and common sense are also important. However direct testing is not needed. 5. Usually the patient's performance over the preceding 24-48 hours is important, but occasionally longer periods will be relevant. 6. Middle categories imply that the patient supplies over 50 per cent of the effort. 7. Use of aids to be independent is allowed. Bernice Pfeiffer., Barthel, VIVIANA. (7649). Functional evaluation: the Barthel Index. [address] [facility] (14)2. Anitha Bustamante solomon PATRICK Quispe, Luis Enrique Del Cid., Umesh Rod., Charmaine, [address] (1999). Measuring the change indisability after inpatient rehabilitation; comparison of the responsiveness of the Barthel Index and Functional [place] Measure. Journal of Neurology, Neurosurgery, and Psychiatry, 66(4), 978-724. Benito Loja, N.J.A, ESTELLE Cool, & Mayda Munoz MALYSSIA. (2004.) Assessment of post-stroke quality of life in cost-effectiveness studies: The usefulness of the Barthel Index and the EuroQoL-5D. Quality of Life Research, 13, 427-43         Pain:  5    Activity Tolerance:   Fair  Please refer to the flowsheet for vital signs taken during this treatment. After treatment patient left in no apparent distress:   Sitting in chair and Call bell within reach    COMMUNICATION/COLLABORATION:   The patients plan of care was discussed with: Physical therapist and Registered nurse.      Vidya Peck, JAVY  Time Calculation: 38 mins

## 2020-10-21 VITALS
SYSTOLIC BLOOD PRESSURE: 142 MMHG | DIASTOLIC BLOOD PRESSURE: 71 MMHG | BODY MASS INDEX: 29.63 KG/M2 | HEART RATE: 56 BPM | OXYGEN SATURATION: 95 % | HEIGHT: 62 IN | RESPIRATION RATE: 18 BRPM | WEIGHT: 161 LBS | TEMPERATURE: 98.2 F

## 2020-10-21 LAB
BASOPHILS # BLD: 0 K/UL (ref 0–0.1)
BASOPHILS NFR BLD: 0 % (ref 0–1)
DIFFERENTIAL METHOD BLD: ABNORMAL
EOSINOPHIL # BLD: 0.3 K/UL (ref 0–0.4)
EOSINOPHIL NFR BLD: 6 % (ref 0–7)
ERYTHROCYTE [DISTWIDTH] IN BLOOD BY AUTOMATED COUNT: 13.9 % (ref 11.5–14.5)
HCT VFR BLD AUTO: 26.3 % (ref 35–47)
HGB BLD-MCNC: 8.2 G/DL (ref 11.5–16)
IMM GRANULOCYTES # BLD AUTO: 0 K/UL (ref 0–0.04)
IMM GRANULOCYTES NFR BLD AUTO: 0 % (ref 0–0.5)
LYMPHOCYTES # BLD: 1.4 K/UL (ref 0.8–3.5)
LYMPHOCYTES NFR BLD: 29 % (ref 12–49)
MCH RBC QN AUTO: 30.1 PG (ref 26–34)
MCHC RBC AUTO-ENTMCNC: 31.2 G/DL (ref 30–36.5)
MCV RBC AUTO: 96.7 FL (ref 80–99)
MONOCYTES # BLD: 0.6 K/UL (ref 0–1)
MONOCYTES NFR BLD: 13 % (ref 5–13)
NEUTS SEG # BLD: 2.5 K/UL (ref 1.8–8)
NEUTS SEG NFR BLD: 52 % (ref 32–75)
NRBC # BLD: 0 K/UL (ref 0–0.01)
NRBC BLD-RTO: 0 PER 100 WBC
PLATELET # BLD AUTO: 109 K/UL (ref 150–400)
PMV BLD AUTO: 11.7 FL (ref 8.9–12.9)
RBC # BLD AUTO: 2.72 M/UL (ref 3.8–5.2)
WBC # BLD AUTO: 4.8 K/UL (ref 3.6–11)

## 2020-10-21 PROCEDURE — 77030038269 HC DRN EXT URIN PURWCK BARD -A

## 2020-10-21 PROCEDURE — 74011250637 HC RX REV CODE- 250/637: Performed by: INTERNAL MEDICINE

## 2020-10-21 PROCEDURE — 74011250637 HC RX REV CODE- 250/637: Performed by: GENERAL ACUTE CARE HOSPITAL

## 2020-10-21 PROCEDURE — 74011250637 HC RX REV CODE- 250/637: Performed by: HOSPITALIST

## 2020-10-21 PROCEDURE — 74011250636 HC RX REV CODE- 250/636: Performed by: INTERNAL MEDICINE

## 2020-10-21 PROCEDURE — 36415 COLL VENOUS BLD VENIPUNCTURE: CPT

## 2020-10-21 PROCEDURE — 85025 COMPLETE CBC W/AUTO DIFF WBC: CPT

## 2020-10-21 PROCEDURE — 94760 N-INVAS EAR/PLS OXIMETRY 1: CPT

## 2020-10-21 RX ORDER — TRAMADOL HYDROCHLORIDE 50 MG/1
50 TABLET ORAL
Qty: 8 TAB | Refills: 0 | Status: SHIPPED | OUTPATIENT
Start: 2020-10-21 | End: 2020-10-26

## 2020-10-21 RX ORDER — AMLODIPINE BESYLATE 10 MG/1
10 TABLET ORAL DAILY
Qty: 30 TAB | Refills: 0 | Status: SHIPPED | OUTPATIENT
Start: 2020-10-21 | End: 2022-05-27

## 2020-10-21 RX ADMIN — ENOXAPARIN SODIUM 40 MG: 40 INJECTION SUBCUTANEOUS at 09:52

## 2020-10-21 RX ADMIN — CARVEDILOL 6.25 MG: 6.25 TABLET, FILM COATED ORAL at 09:53

## 2020-10-21 RX ADMIN — TRAMADOL HYDROCHLORIDE 50 MG: 50 TABLET ORAL at 09:52

## 2020-10-21 RX ADMIN — OXYCODONE HYDROCHLORIDE AND ACETAMINOPHEN 500 MG: 500 TABLET ORAL at 09:52

## 2020-10-21 RX ADMIN — Medication 10 ML: at 06:37

## 2020-10-21 RX ADMIN — FERROUS SULFATE TAB 325 MG (65 MG ELEMENTAL FE) 325 MG: 325 (65 FE) TAB at 09:52

## 2020-10-21 RX ADMIN — PENTOXIFYLLINE 400 MG: 400 TABLET, EXTENDED RELEASE ORAL at 09:52

## 2020-10-21 RX ADMIN — AMLODIPINE BESYLATE 10 MG: 5 TABLET ORAL at 09:52

## 2020-10-21 NOTE — PROGRESS NOTES
Orthopedic End of Shift Note Bedside shift change report given to [place], RN (oncoming nurse) by ISAAK Taylor (offgoing nurse). Report included the following information SBAR, Kardex, Procedure Summary, Intake/Output, MAR, Accordion and Recent Results. POD# 2 Significant issues during shift: None Issues for Physician to address: None Activity This Shift 
(check all that apply) [] chair 
[] dangle 
 [] bathroom 
[] bedside commode [] hallway [x] bedrest  
Nausea/Vomiting [] yes [x] no    
Voiding Status [x] void [] Munguia [] I&O Cath Bowel Movements [] yes [x] no Foot Pumps or SCD [] yes [x] no Ice Pack [] yes    [x] no Incentive Spirometer [] yes [x] no Volume:     
Telemetry Monitoring   [] yes [x] no Rhythm:  
Supplemental O2 [] yes [x] no Sat off O2:   %

## 2020-10-21 NOTE — PROGRESS NOTES
Bedside and Verbal shift change report given to [facility] (oncoming nurse) by Abi Amarla (offgoing nurse). Report included the following information SBAR, Kardex, Intake/Output, MAR and Recent Results.

## 2020-10-21 NOTE — PROGRESS NOTES
Problem: Pressure Injury - Risk of  Goal: *Prevention of pressure injury  Description: Document Zachary Scale and appropriate interventions in the flowsheet. Outcome: Progressing Towards Goal  Note: Pressure Injury Interventions:  Sensory Interventions: Assess changes in LOC, Assess need for specialty bed, Avoid rigorous massage over bony prominences, Check visual cues for pain, Discuss PT/OT consult with provider, Chair cushion, Keep linens dry and wrinkle-free, Float heels, Minimize linen layers, Maintain/enhance activity level    Moisture Interventions: Absorbent underpads, Internal/External urinary devices    Activity Interventions: Increase time out of bed, PT/OT evaluation    Mobility Interventions: PT/OT evaluation    Nutrition Interventions: Document food/fluid/supplement intake    Friction and Shear Interventions: HOB 30 degrees or less, Lift sheet, Minimize layers                Problem: Patient Education: Go to Patient Education Activity  Goal: Patient/Family Education  Outcome: Progressing Towards Goal     Problem: Falls - Risk of  Goal: *Absence of Falls  Description: Document Oscar Fall Risk and appropriate interventions in the flowsheet.   Outcome: Progressing Towards Goal  Note: Fall Risk Interventions:  Mobility Interventions: Communicate number of staff needed for ambulation/transfer, Patient to call before getting OOB         Medication Interventions: Patient to call before getting OOB, Evaluate medications/consider consulting pharmacy    Elimination Interventions: Patient to call for help with toileting needs, Call light in reach    History of Falls Interventions: Door open when patient unattended, Room close to nurse's station         Problem: Patient Education: Go to Patient Education Activity  Goal: Patient/Family Education  Outcome: Progressing Towards Goal     Problem: Patient Education: Go to Patient Education Activity  Goal: Patient/Family Education  Outcome: Progressing Towards Goal Problem: Patient Education: Go to Patient Education Activity  Goal: Patient/Family Education  Outcome: Progressing Towards Goal     Problem: Pain  Goal: *Control of Pain  Outcome: Progressing Towards Goal  Goal: *PALLIATIVE CARE:  Alleviation of Pain  Outcome: Progressing Towards Goal     Problem: Patient Education: Go to Patient Education Activity  Goal: Patient/Family Education  Outcome: Progressing Towards Goal

## 2020-10-21 NOTE — PROGRESS NOTES
Sbar report called to Phill gutierrez  To Valiant Race LPN at [phone] with opportunity for questions and clarification .

## 2020-10-21 NOTE — PROGRESS NOTES
LYNDSEY  1) [facility]  2) [facility] @ 3:15pm  3) call report [phone]     12:30pm  [facility] reported the earliest they could accommodate transportation at 3:15pm. Facility, nurse, and patient aware. CM contacted daughter to inform her of d/c plan and time. Medicare pt has received, reviewed, and signed 2nd IM letter informing them of their right to appeal the discharge. Signed copy has been placed on pt bedside chart. 11:46am  CM received phone call from Swrve at [facility] who reported they can accept patient today. CM has sent referral to [facility] and waiting for response. 11:40am  CM contacted [facility] and spoke with Swrve in admissions again who reported she has not heard back from [address] [place] [address] [employer] yet but should have an answer by \"the end of lunch time\". 10:35am  CM contacted [facility] and spoke with Swrve in admissions who reported that she has not heard back from the [address] [place], [place] [address] yet on a decision if they can accept patient today or not. COVID has resulted negative. 9:45am  CM received a phone call from Swrve in admissions at [facility] who reported that they received the referral and inquired when pt would be ready for d/c. CM informed [facility] that patient would likely be able to d/c as soon as COVID test is resulted. Per Swrve they have 4 or 5 referrals that they are reviewing and only 1 bed available at this time and would call CM back shortly with an answer as to if they can accept or not. LITZY received voice message from pt's daughter, Chrissy Mills [phone], who requested a return phone call. CM contacted pt's daughter and informed her of the above information. Dtr mentioned that her second choice is likely [facility] in [place]. CM informed dtr that [facility] is often full and more difficult to get accepted to. CM encouraged another back up option in addition to [facility]. CM informed dtr that pt had chosen [facility] yesterday and referral was sent and accepted. Dtr seemed receptive to this option as well. CM informed dtr that pt could possibly d/c today if everything is in place. 9:00am  CM spoke with MD who reported that patient's daughter would like pt to go to [facility]. CM made room visit shortly after with patient but daughter was not at bedside. Pt confirmed that dtr wanted pt to go to [facility] and pt was agreeable to this decision. FOC signed and placed on bedside chart. Referral sent to [facility] via allscripts and waiting for response.      Amarilys Gutierrez, [address], [address]

## 2020-10-21 NOTE — PROGRESS NOTES
Transition of Care Plan to SNF/Rehab    SNF/Rehab Transition:  Patient has been accepted to [facility] and meets criteria for admission. Patient will transported by [facility] and expected to leave at D. Communication to Patient/Family:  Met with patient and daughter (identified care giver) and they are agreeable to the transition plan. Communication to SNF/Rehab:  Bedside RN, Umesh Valencia, has been notified to update the transition plan to the facility and call report (phone number [phone]). Discharge information has been updated on the AVS.     Discharge instructions to be fax'd to facility at [facility] # [phone]). Nursing Please include all hard scripts for controlled substances, med rec and dc summary, and AVS in packet. Reviewed and confirmed with facility, [facility], can manage the patient care needs for the following:     Alpa Alegria with (X) only those applicable:    Medication:  [x]  Medications will be available at the facility  []  IV Antibiotics  [x]  Controlled Substance - hard copy to be sent with patient   []  Weekly Labs   Documents:  [x] Hard RX  [x] MAR  [x] Kardex  [x] AVS  [x]Transfer Summary  [x]Discharge   Equipment:  []  CPAP/BiPAP  []  Wound Vacuum  []  Munguia or Urinary Device  []  PICC/Central Line  []  Nebulizer  []  Ventilator   Treatment:  []Isolation (for MRSA, VRE, etc.)  []Surgical Drain Management  []Tracheostomy Care  []Dressing Changes  []Dialysis with transportation and chair time. []PEG Care  []Oxygen  []Daily Weights for Heart Failure   Dietary:  [x]Any diet limitations cardiac   []Tube Feedings   []Total Parenteral Management (TPN)   Eligible for Medicaid Long Term Services and Supports  Yes:  [] Eligible for medical assistance or will become eligible within 180 days and UAI completed. [] Provider/Patient and/or support system has requested screening. [] UAI copy provided to patient or responsible party.   [] UAI unavailable at discharge will send once processed to SNF provider. [] UAI unavailable at discharged mailed to patient  No:   [] Private pay and is not financially eligible for Medicaid within the next 180 days. [] Reside out-of-state.   [] A residents of a state owned/operated facility that is licensed  by [address] [employer] [facility] or [facility]  [] Enrollment in [employer] [facility] hospice services  [] [address] [place] [address] [facility]  [] Patient /Family declines to have screening completed or provide financial information for screening     Financial Resources:  Medicaid    [] Initiated and application pending   [] Full coverage     Advanced Care Plan:  []Surrogate Decision Maker of Care  []POA  [x]Communicated Code Status full    Other

## 2020-10-21 NOTE — DISCHARGE INSTRUCTIONS
HOSPITALIST DISCHARGE INSTRUCTIONS    NAME: Phyllis Shirley   :  1940   MRN:  [id number]     Date/Time:  10/21/2020 10:53 AM    ADMIT DATE: 10/14/2020   DISCHARGE DATE: 10/21/2020     Attending Physician: Fuentes Salcedo MD    DISCHARGE DIAGNOSIS:  Acute L1/L5 compression fracture  Chr anemia-   Hypertension   Hyperlipidemia        Medications: Per above medication reconciliation. Pain Management: per above medications    Recommended diet: Regular Diet    Recommended activity: Activity as tolerated    Wound care: None    Indwelling devices:  None    Supplemental Oxygen: None    Required Lab work: Per SNF routine    Glucose management:  None    Code status: Full        Outside physician follow up: Follow-up Information    None              Skilled nursing facility/ SNF MD responsible for above on discharge. Information obtained by :  I understand that if any problems occur once I am at home I am to contact my physician. I understand and acknowledge receipt of the instructions indicated above.                                                                                                                                            Physician's or R.N.'s Signature                                                                  Date/Time                                                                                                                                              Patient or Repres

## 2020-10-21 NOTE — DISCHARGE SUMMARY
Hospitalist Discharge Summary     Patient ID:  Maria D Houston  [id number]  53 y.o.  1940  10/14/2020    PCP on record: Chinyere Martinez MD    Admit date: 10/14/2020  Discharge date and time: 10/21/2020    DISCHARGE DIAGNOSIS:    Acute L1/L5 compression fracture  Chr anemia-   Hypertension   Hyperlipidemia      CONSULTATIONS:  IP CONSULT TO NEUROSURGERY  IP CONSULT TO HOSPITALIST  IP CONSULT TO ORTHOPEDIC SURGERY    Excerpted HPI from H&P of Jesus Manuel Carlton MD:  78years old female from home with past medical history significant for hypertension, chronic pain, depression, hyperlipidemia presented to the hospital for evaluation of worsening lower back pain started about 1 week ago, patient is stated she was in the car in her way to get a flu shot when the  accidentally hit a bump and she developed after that severe back pain, she was seen at ED 3 days ago and was discharged home without any imaging, lumbar spine x-ray today show L1 compression fracture.       ______________________________________________________________________  DISCHARGE SUMMARY/HOSPITAL COURSE:  for full details see H&P, daily progress notes, labs, consult notes. Acute L1/L5 compression fracture  S/p L1 and L5 kyphoplasty 10/19   MRI L spine noted     sars-cov 2 negative  PT and OT recommends rehab    Discussed with the daughter at the bedside, recommends outpatient DEXA scan and treatment regarding that.     Chr anemia- Hgb stable monitor periodically, transfuse if <7.     Hypertension - increased Norvasc dosage, PRN Hydralazine     Hyperlipidemia-continue statin     Depression-Continue home medication         _______________________________________________________________________  Patient seen and examined by me on discharge day. Pertinent Findings:  Gen:    Not in distress  Chest: Clear lungs  CVS:   Regular rhythm.   No edema  Abd:  Soft, not distended, not tender  Neuro:  Alert, oriented  _______________________________________________________________________  DISCHARGE MEDICATIONS:   Current Discharge Medication List      CONTINUE these medications which have CHANGED    Details   traMADoL (ULTRAM) 50 mg tablet Take 1 Tab by mouth every eight (8) hours as needed for Pain for up to 5 days. Max Daily Amount: 150 mg.  Qty: 8 Tab, Refills: 0    Associated Diagnoses: Severe low back pain; Closed fracture of first lumbar vertebra with routine healing, unspecified fracture morphology, subsequent encounter      amLODIPine (NORVASC) 10 mg tablet Take 1 Tab by mouth daily. Qty: 30 Tab, Refills: 0         CONTINUE these medications which have NOT CHANGED    Details   folic acid (FOLVITE) 472 mcg tablet Take 800 mcg by mouth daily. magnesium 250 mg tab Take 250 mg by mouth daily (after dinner). multivitamin (ONE A DAY) tablet Take 1 Tab by mouth daily. carvediloL (Coreg) 3.125 mg tablet Take 3.125 mg by mouth two (2) times daily (with meals). ascorbic acid, vitamin C, (VITAMIN C) 500 mg tablet Take 500 mg by mouth daily. ferrous sulfate (IRON) 325 mg (65 mg iron) EC tablet Take 325 mg by mouth Daily (before breakfast). pentoxifylline CR (TRENTAL) 400 mg CR tablet Take 400 mg by mouth two (2) times a day. simvastatin (ZOCOR) 20 mg tablet Take 20 mg by mouth nightly. citalopram (CELEXA) 20 mg tablet Take 20 mg by mouth every evening. meclizine (ANTIVERT) 12.5 mg tablet Take 6.25 mg by mouth three (3) times daily as needed. traZODone (DESYREL) 50 mg tablet Take 25 mg by mouth nightly. fenofibrate nanocrystallized (TRICOR) 145 mg tablet Take 145 mg by mouth nightly. Patient Follow Up Instructions:    Activity: Activity as tolerated  Diet: Cardiac Diet  Wound Care: None needed      Follow-up Information     Follow up With Specialties Details Why Contact Zak John MD Wound Care  Discuss regarding DEXA scan and treatment for osteoporosis [address], Elzbieta Belle MD Wound Care   [address] [facility]  [phone]          ________________________________________________________________    Risk of deterioration: Low    Condition at Discharge:  Stable  __________________________________________________________________    Disposition  SNF/LTC    ____________________________________________________________________    Code Status: Full Code  ___________________________________________________________________      Total time in minutes spent coordinating this discharge (includes going over instructions, follow-up, prescriptions, and preparing report for sign off to her PCP) :  >30 minutes    Signed:  Michael Vasquez MD

## 2020-10-21 NOTE — PROGRESS NOTES
Post kypho.  Pain better each day    Patient Vitals for the past 24 hrs:   Temp Pulse Resp BP SpO2   10/21/20 0810 98.1 °F (36.7 °C) 62 18 (!) 155/72 94 %   10/21/20 0300 98.3 °F (36.8 °C) (!) 59 20 (!) 145/64 96 %   10/20/20 2330 98.6 °F (37 °C) (!) 108 18 (!) 125/42 95 %   10/20/20 1930 98.7 °F (37.1 °C) (!) 56 18 (!) 120/39 93 %   10/20/20 1655 98.2 °F (36.8 °C) 64 20 (!) 127/42 98 %   10/20/20 1225 98.4 °F (36.9 °C) 60 22 111/72 99 %   10/20/20 1217 -- -- -- (!) 131/53 --   10/20/20 1202 -- 67 -- 104/72 99 %   10/20/20 1158 -- 68 -- (!) 122/42 --   10/20/20 1153 -- 67 -- 104/86 --     TTP lower back    oob with pt  Dc planning  wbat  Follow up 2 weeks PO for xrays

## 2021-09-18 ENCOUNTER — APPOINTMENT (OUTPATIENT)
Dept: GENERAL RADIOLOGY | Age: 81
End: 2021-09-18
Attending: EMERGENCY MEDICINE
Payer: MEDICARE

## 2021-09-18 ENCOUNTER — HOSPITAL ENCOUNTER (EMERGENCY)
Age: 81
Discharge: HOME | End: 2021-09-19
Attending: EMERGENCY MEDICINE
Payer: MEDICARE

## 2021-09-18 ENCOUNTER — APPOINTMENT (OUTPATIENT)
Dept: CT IMAGING | Age: 81
End: 2021-09-18
Attending: EMERGENCY MEDICINE
Payer: MEDICARE

## 2021-09-18 VITALS
WEIGHT: 158 LBS | RESPIRATION RATE: 18 BRPM | HEIGHT: 62 IN | BODY MASS INDEX: 29.08 KG/M2 | OXYGEN SATURATION: 98 % | HEART RATE: 60 BPM | TEMPERATURE: 98.1 F | SYSTOLIC BLOOD PRESSURE: 124 MMHG | DIASTOLIC BLOOD PRESSURE: 58 MMHG

## 2021-09-18 DIAGNOSIS — S70.02XA CONTUSION OF LEFT HIP, INITIAL ENCOUNTER: Primary | ICD-10-CM

## 2021-09-18 PROCEDURE — 73502 X-RAY EXAM HIP UNI 2-3 VIEWS: CPT

## 2021-09-18 PROCEDURE — 99282 EMERGENCY DEPT VISIT SF MDM: CPT

## 2021-09-18 PROCEDURE — 72192 CT PELVIS W/O DYE: CPT

## 2021-09-19 RX ORDER — ACETAMINOPHEN 325 MG/1
650 TABLET ORAL
Qty: 20 TABLET | Refills: 0 | Status: SHIPPED | OUTPATIENT
Start: 2021-09-19 | End: 2021-09-28

## 2021-09-19 NOTE — ED PROVIDER NOTES
EMERGENCY DEPARTMENT HISTORY AND PHYSICAL EXAM      Date: 9/18/2021  Patient Name: Alee Rodríguez    History of Presenting Illness     Chief Complaint   Patient presents with    Hip Pain     L sided, pt fell backwards one week ago getting laundry out of dreir, evaluated by EMS at that time but refused transport, now having worsening L sided hip pain with ambulation       History Provided By: Patient and Patient's Daughter    HPI: Alee Rodríguez, 78 y.o. female with PMHx significant for chronic hip and back pain, diabetes, depression, GERD, hypertension, prior CVA, who presents with a chief complaint of left hip pain. Patient had a fall almost 2 weeks ago when she lost her balance while attempting to put close into the laundry. She typically ambulates with a walker but was not holding onto the walker at the time. She landed on her buttocks and left hip. Refused transport at that time and has been ambulating at home ever since. Daughter reports she is walking with little bit of a limp. Here today as she is still having some discomfort. No chest pain, shortness breath, abdominal pain. PCP: Patt Green MD    There are no other complaints, changes, or physical findings at this time. Current Outpatient Medications   Medication Sig Dispense Refill    acetaminophen (TYLENOL) 325 mg tablet Take 2 Tablets by mouth every four (4) hours as needed for Pain. 20 Tablet 0    amLODIPine (NORVASC) 10 mg tablet Take 1 Tab by mouth daily. 30 Tab 0    folic acid (FOLVITE) 000 mcg tablet Take 800 mcg by mouth daily.  magnesium 250 mg tab Take 250 mg by mouth daily (after dinner).  multivitamin (ONE A DAY) tablet Take 1 Tab by mouth daily.  carvediloL (Coreg) 3.125 mg tablet Take 3.125 mg by mouth two (2) times daily (with meals).  meclizine (ANTIVERT) 12.5 mg tablet Take 6.25 mg by mouth three (3) times daily as needed.       ascorbic acid, vitamin C, (VITAMIN C) 500 mg tablet Take 500 mg by mouth daily.  ferrous sulfate (IRON) 325 mg (65 mg iron) EC tablet Take 325 mg by mouth Daily (before breakfast).  traZODone (DESYREL) 50 mg tablet Take 25 mg by mouth nightly.  pentoxifylline CR (TRENTAL) 400 mg CR tablet Take 400 mg by mouth two (2) times a day.  simvastatin (ZOCOR) 20 mg tablet Take 20 mg by mouth nightly.  fenofibrate nanocrystallized (TRICOR) 145 mg tablet Take 145 mg by mouth nightly.  citalopram (CELEXA) 20 mg tablet Take 20 mg by mouth every evening. Past History     Past Medical History:  Past Medical History:   Diagnosis Date    Chronic pain     hip, back    Depression     Diabetes ([facility] [address]     GERD (gastroesophageal reflux disease)     Hemorrhagic gastritis     10/2013    [place] (hard of hearing)     no hearing aides    Hyperlipemia     Hypertension     Stroke (cerebrum) ([facility] [address]     no residual    Stroke (HCC)     No residual says patient     Past Surgical History:  Past Surgical History:   Procedure Laterality Date    HX  SECTION  1979    HX CHOLECYSTECTOMY      IR KYPHOPLASTY LUMBAR  10/19/2020    IR KYPHOPLASTY LUMBAR  10/19/2020    [place] COLONOSCOPY FLX DX W/COLLJ SPEC WHEN PFRMD  2013         [place] ESOPHAGOGASTRODUODENOSCOPY TRANSORAL DIAGNOSTIC  10/10/2013          Family History:  Family History   Problem Relation Age of Onset    Other Other         HTN and DM     Social History:  Social History     Tobacco Use    Smoking status: Never Smoker    Smokeless tobacco: Never Used   Substance Use Topics    Alcohol use: No    Drug use: No     Allergies: Allergies   Allergen Reactions    Aspirin Hives    Codeine Hives and Nausea and Vomiting     Review of Systems   Review of Systems   Musculoskeletal: Positive for arthralgias. All other systems reviewed and are negative. Physical Exam   Physical Exam  Vitals and nursing note reviewed. Constitutional:       General: She is not in acute distress.      Appearance: She is well-developed. HENT:      Head: Normocephalic and atraumatic. Eyes:      Conjunctiva/sclera: Conjunctivae normal.      Pupils: Pupils are equal, round, and reactive to light. Cardiovascular:      Rate and Rhythm: Normal rate and regular rhythm. Pulmonary:      Effort: Pulmonary effort is normal. No respiratory distress. Breath sounds: Normal breath sounds. No stridor. Abdominal:      General: There is no distension. Palpations: Abdomen is soft. Tenderness: There is no abdominal tenderness. Musculoskeletal:         General: Normal range of motion. Cervical back: Normal range of motion. Back:       Right lower le+ Pitting Edema present. Left lower le+ Pitting Edema present. Comments: No midline lumbar ttp   Skin:     General: Skin is warm and dry. Neurological:      Mental Status: She is alert and oriented to person, place, and time. Diagnostic Study Results   Labs -   No results found for this or any previous visit (from the past 12 hour(s)). Radiologic Studies -   CT PELV WO CONT   Final Result   1. No acute fracture or subluxation. 2. Moderate amount of colonic stool. Nonspecific small amount of pelvic free   fluid. XR HIP LT W OR WO PELV 2-3 VWS   Final Result   No acute abnormality. XR HIP LT W OR WO PELV 2-3 VWS    Result Date: 2021  No acute abnormality. CT PELV WO CONT    Result Date: 2021  1. No acute fracture or subluxation. 2. Moderate amount of colonic stool. Nonspecific small amount of pelvic free fluid. Medical Decision Making   I am the first provider for this patient. I reviewed the vital signs, available nursing notes, past medical history, past surgical history, family history and social history. Vital Signs-Reviewed the patient's vital signs.   Patient Vitals for the past 12 hrs:   Temp Pulse Resp BP SpO2   21 1939 98.1 °F (36.7 °C) 60 18 (!) 124/58 98 %       Pulse Oximetry Analysis - 98% on ra      Records Reviewed: Nursing Notes and Old Medical Records    Provider Notes (Medical Decision Making):   Patient presents with a chief complaint of left hip pain after a fall almost 2 weeks ago. Overall nontoxic-appearing on exam.  Is tender over her left SI joint and lower back. Had x-ray of her hip as well as a CT scan of her pelvis prior to my evaluation which are negative for acute fracture. Do not feel additional labs or imaging are needed at this time. Advise follow-up with primary care and using her walker at all times. ED return precautions were discussed. ED Course:   Initial assessment performed. The patients presenting problems have been discussed, and they are in agreement with the care plan formulated and outlined with them. I have encouraged them to ask questions as they arise throughout their visit. Procedures:  Procedures    Critical Care:  none    Disposition:  Discharge Note:  The patient has been re-evaluated and is ready for discharge. Reviewed available results with patient. Counseled patient on diagnosis and care plan. Patient has expressed understanding, and all questions have been answered. Patient agrees with plan and agrees to follow up as recommended, or to return to the ED if their symptoms worsen. Discharge instructions have been provided and explained to the patient, along with reasons to return to the ED. PLAN:  1. Discharge Medication List as of 9/19/2021 12:36 AM      START taking these medications    Details   acetaminophen (TYLENOL) 325 mg tablet Take 2 Tablets by mouth every four (4) hours as needed for Pain., Normal, Disp-20 Tablet, R-0         CONTINUE these medications which have NOT CHANGED    Details   amLODIPine (NORVASC) 10 mg tablet Take 1 Tab by mouth daily. , Normal, ZPWB-19 Tab,R-0      folic acid (FOLVITE) 967 mcg tablet Take 800 mcg by mouth daily. , Historical Med      magnesium 250 mg tab Take 250 mg by mouth daily (after dinner). , Historical Med      multivitamin (ONE A DAY) tablet Take 1 Tab by mouth daily. , Historical Med      carvediloL (Coreg) 3.125 mg tablet Take 3.125 mg by mouth two (2) times daily (with meals). , Historical Med      meclizine (ANTIVERT) 12.5 mg tablet Take 6.25 mg by mouth three (3) times daily as needed., Historical Med      ascorbic acid, vitamin C, (VITAMIN C) 500 mg tablet Take 500 mg by mouth daily. , Historical Med      ferrous sulfate (IRON) 325 mg (65 mg iron) EC tablet Take 325 mg by mouth Daily (before breakfast). , Historical Med      traZODone (DESYREL) 50 mg tablet Take 25 mg by mouth nightly., Historical Med      pentoxifylline CR (TRENTAL) 400 mg CR tablet Take 400 mg by mouth two (2) times a day., Historical Med      simvastatin (ZOCOR) 20 mg tablet Take 20 mg by mouth nightly., Historical Med      fenofibrate nanocrystallized (TRICOR) 145 mg tablet Take 145 mg by mouth nightly., Historical Med      citalopram (CELEXA) 20 mg tablet Take 20 mg by mouth every evening., Historical Med           2. Follow-up Information     Follow up With Specialties Details Why Contact Info    Raman Angelo MD Wound Care Schedule an appointment as soon as possible for a visit   [address]  [phone]      [facility] EMERGENCY DEPT Emergency Medicine  As needed, If symptoms worsen [address]  [address]  [phone]        Return to ED if worse     Diagnosis     Clinical Impression:   1. Contusion of left hip, initial encounter            Please note that this dictation was completed with HZO, the [email or web address] voice recognition software. Quite often unanticipated grammatical, syntax, homophones, and other interpretive errors are inadvertently transcribed by the computer software. Please disregard these errors.   Please excuse any errors that have escaped final proofreading

## 2021-09-19 NOTE — ED NOTES
Lee Ann AVERY reviewed discharge instructions with the patient. The patient verbalized understanding. All questions and concerns were addressed. The patient is discharged by wheelchair with instructions and prescriptions in hand. Pt is alert and oriented x 4. Respirations are clear and unlabored.

## 2021-09-28 ENCOUNTER — HOSPITAL ENCOUNTER (EMERGENCY)
Age: 81
Discharge: HOME | End: 2021-09-28
Attending: EMERGENCY MEDICINE
Payer: MEDICARE

## 2021-09-28 ENCOUNTER — APPOINTMENT (OUTPATIENT)
Dept: GENERAL RADIOLOGY | Age: 81
End: 2021-09-28
Attending: PHYSICIAN ASSISTANT
Payer: MEDICARE

## 2021-09-28 VITALS
BODY MASS INDEX: 29.08 KG/M2 | RESPIRATION RATE: 16 BRPM | TEMPERATURE: 98.3 F | DIASTOLIC BLOOD PRESSURE: 40 MMHG | OXYGEN SATURATION: 97 % | HEART RATE: 59 BPM | WEIGHT: 158 LBS | SYSTOLIC BLOOD PRESSURE: 126 MMHG | HEIGHT: 62 IN

## 2021-09-28 DIAGNOSIS — G89.29 ACUTE EXACERBATION OF CHRONIC LOW BACK PAIN: Primary | ICD-10-CM

## 2021-09-28 DIAGNOSIS — M54.50 ACUTE EXACERBATION OF CHRONIC LOW BACK PAIN: Primary | ICD-10-CM

## 2021-09-28 PROCEDURE — 72170 X-RAY EXAM OF PELVIS: CPT

## 2021-09-28 PROCEDURE — 99283 EMERGENCY DEPT VISIT LOW MDM: CPT

## 2021-09-28 PROCEDURE — 74011250637 HC RX REV CODE- 250/637: Performed by: PHYSICIAN ASSISTANT

## 2021-09-28 RX ORDER — METHOCARBAMOL 500 MG/1
500 TABLET, FILM COATED ORAL
Qty: 20 TABLET | Refills: 0 | Status: SHIPPED | OUTPATIENT
Start: 2021-09-28 | End: 2022-05-27

## 2021-09-28 RX ORDER — PREDNISONE 10 MG/1
TABLET ORAL
Qty: 21 TABLET | Refills: 0 | Status: SHIPPED | OUTPATIENT
Start: 2021-09-28 | End: 2022-05-27

## 2021-09-28 RX ORDER — TRAMADOL HYDROCHLORIDE 50 MG/1
50 TABLET ORAL
Status: ON HOLD | COMMUNITY
End: 2022-05-27 | Stop reason: SDUPTHER

## 2021-09-28 RX ORDER — TRAMADOL HYDROCHLORIDE 50 MG/1
100 TABLET ORAL
Status: COMPLETED | OUTPATIENT
Start: 2021-09-28 | End: 2021-09-28

## 2021-09-28 RX ADMIN — TRAMADOL HYDROCHLORIDE 100 MG: 50 TABLET, FILM COATED ORAL at 14:24

## 2021-09-28 NOTE — ED NOTES
Meenu VELAZQUEZ reviewed discharge instructions with the patient. The patient verbalized understanding. All questions and concerns were addressed. The patient is discharged via wheelchair in the care of family members with instructions and prescriptions in hand. Pt is alert and oriented x 4. Respirations are clear and unlabored.

## 2021-09-28 NOTE — DISCHARGE INSTRUCTIONS
It was a pleasure taking care of you at [facility] Emergency Department today. We know that when you come to [facility], you are entrusting us with your health, comfort, and safety. Our physicians and nurses honor that trust, and we truly appreciate the opportunity to care for you and your loved ones. We also value your feedback. If you receive a survey about your Emergency Department experience today, please fill it out. We care about our patients' feedback, and we listen to what you have to say. Thank you!

## 2021-09-28 NOTE — ED PROVIDER NOTES
EMERGENCY DEPARTMENT HISTORY AND PHYSICAL EXAM      Date: 9/28/2021  Patient Name: Alecia Cedillo    History of Presenting Illness     Chief Complaint   Patient presents with    Hip Pain     pt was here on 09/18 for rt hip pain. pt fell on the 7th resulting in the hip pain. pain is not improving        History Provided By: Patient and Patient's Daughter    HPI: Alecia Cedillo, 78 y.o. female with significant PMHx for chronic back pain, HTN and high cholesterol, presents by POV to the ED with cc of ongoing back pain. The patient reports at [facility] on 9/7/21. She had immediate onset of back and left hip pain following this fall. She, however, waited until 9/18/2021 to come to the ED for evaluation. On that visit she reports a negative x-ray and negative CT scan. She was discharged home and notes that the pain has continued but is now located more in the right lower back. She has been taking tramadol without relief. She is ambulatory. The pain is a constant pain that worsens with movement and is nonradiating. There are no other complaints, changes, or physical findings at this time. Social Hx: Tobacco (denies), EtOH (denies), Illicit drug use (denies)     PCP: Nawaf Hughes MD    No current facility-administered medications on file prior to encounter. Current Outpatient Medications on File Prior to Encounter   Medication Sig Dispense Refill    traMADoL (ULTRAM) 50 mg tablet Take 50 mg by mouth every eight (8) hours as needed for Pain.  amLODIPine (NORVASC) 10 mg tablet Take 1 Tab by mouth daily. 30 Tab 0    folic acid (FOLVITE) 742 mcg tablet Take 800 mcg by mouth daily.  magnesium 250 mg tab Take 250 mg by mouth daily (after dinner).  multivitamin (ONE A DAY) tablet Take 1 Tab by mouth daily.  carvediloL (Coreg) 3.125 mg tablet Take 3.125 mg by mouth two (2) times daily (with meals).  ascorbic acid, vitamin C, (VITAMIN C) 500 mg tablet Take 500 mg by mouth daily.       ferrous sulfate (IRON) 325 mg (65 mg iron) EC tablet Take 325 mg by mouth Daily (before breakfast).  traZODone (DESYREL) 50 mg tablet Take 25 mg by mouth nightly.  pentoxifylline CR (TRENTAL) 400 mg CR tablet Take 400 mg by mouth two (2) times a day.  simvastatin (ZOCOR) 20 mg tablet Take 20 mg by mouth nightly.  fenofibrate nanocrystallized (TRICOR) 145 mg tablet Take 145 mg by mouth nightly.  citalopram (CELEXA) 20 mg tablet Take 20 mg by mouth every evening.  [DISCONTINUED] acetaminophen (TYLENOL) 325 mg tablet Take 2 Tablets by mouth every four (4) hours as needed for Pain. 20 Tablet 0    meclizine (ANTIVERT) 12.5 mg tablet Take 6.25 mg by mouth three (3) times daily as needed. Past History     Past Medical History:  Past Medical History:   Diagnosis Date    Chronic pain     hip, back    Depression     Diabetes ([facility]     GERD (gastroesophageal reflux disease)     Hemorrhagic gastritis     10/2013    [place] (hard of hearing)     no hearing aides    Hyperlipemia     Hypertension     Stroke (cerebrum) ([facility] [address]     no residual    Stroke (HCC)     No residual says patient       Past Surgical History:  Past Surgical History:   Procedure Laterality Date    HX  SECTION      HX CHOLECYSTECTOMY      IR KYPHOPLASTY LUMBAR  10/19/2020    IR KYPHOPLASTY LUMBAR  10/19/2020    ME COLONOSCOPY FLX DX W/COLLJ SPEC WHEN PFRMD  2013         ME ESOPHAGOGASTRODUODENOSCOPY TRANSORAL DIAGNOSTIC  10/10/2013            Family History:  Family History   Problem Relation Age of Onset    Other Other         HTN and DM       Social History:  Social History     Tobacco Use    Smoking status: Never Smoker    Smokeless tobacco: Never Used   Substance Use Topics    Alcohol use: No    Drug use: No       Allergies:   Allergies   Allergen Reactions    Aspirin Hives    Codeine Hives and Nausea and Vomiting         Review of Systems   Review of Systems Constitutional: Negative for chills, diaphoresis and fever. HENT: Negative for congestion, ear pain, rhinorrhea and sore throat. Respiratory: Negative for cough and shortness of breath. Cardiovascular: Negative for chest pain. Gastrointestinal: Negative for abdominal pain, constipation, diarrhea, nausea and vomiting. Genitourinary: Negative for difficulty urinating, dysuria, frequency and hematuria. Denies incontinence urine. Musculoskeletal: Positive for arthralgias, back pain and gait problem. Negative for myalgias. Neurological: Negative for weakness and headaches. Denies saddle paresthesias. All other systems reviewed and are negative. Physical Exam   Physical Exam  Vitals and nursing note reviewed. Constitutional:       General: She is not in acute distress. Appearance: She is well-developed. She is not diaphoretic. Comments: 78 y.o.  female    HENT:      Head: Normocephalic and atraumatic. Eyes:      General:         Right eye: No discharge. Left eye: No discharge. Conjunctiva/sclera: Conjunctivae normal.   Cardiovascular:      Rate and Rhythm: Normal rate and regular rhythm. Pulses: Normal pulses. Heart sounds: Normal heart sounds. No murmur heard. Pulmonary:      Effort: Pulmonary effort is normal. No respiratory distress. Breath sounds: Normal breath sounds. Musculoskeletal:      Cervical back: Normal range of motion and neck supple. Comments: BACK: Normal spinal curvatures. No step off or deformity. Tender to palpation of the right SI joint. Negative seated SLR bilaterally. Strength of the LE 5/5 and equal bilaterally. Skin:     General: Skin is warm and dry. Neurological:      Mental Status: She is alert and oriented to person, place, and time.    Psychiatric:         Behavior: Behavior normal.         Diagnostic Study Results     Labs - none    Radiologic Studies -   XR PELV 1 OR 2 V   Final Result No acute bony abnormality of the pelvis. The above xray(s) have been interpreted independently by me and I agree with the radiologists findings above. Medical Decision Making   I am the first provider for this patient. I reviewed the vital signs, available nursing notes, past medical history, past surgical history, family history and social history. Vital Signs-Reviewed the patient's vital signs. Patient Vitals for the past 12 hrs:   Temp Pulse Resp BP SpO2   09/28/21 1236 98.3 °F (36.8 °C) (!) 59 16 (!) 126/40 97 %       Records Reviewed: Nursing Notes and Old Medical Records   Reviewed ED records from earlier this month. Provider Notes (Medical Decision Making):   Presents the ED with right lower back pain. Vitals are stable. Differential diagnosis include fracture, sprain, strain, arthritis, DDD, DJD, herniated disc, exacerbation of chronic pain. X-rays are negative for acute injury. Patient already on tramadol for pain. I have written prescriptions for Robaxin and prednisone. She should take these medications and follow-up with her primary care doctor orthopedics for further evaluation. She can return to the ED for deterioration. ED Course:   Initial assessment performed. The patients presenting problems have been discussed, and they are in agreement with the care plan formulated and outlined with them. I have encouraged them to ask questions as they arise throughout their visit. Critical Care Time: None    Disposition:  DISCHARGE NOTE:  3:50 PM  The pt is ready for discharge. The pt's signs, symptoms, diagnosis, and discharge instructions have been discussed and pt has conveyed their understanding. The pt is to follow up as recommended or return to ER should their symptoms worsen. Plan has been discussed and pt is in agreement. PLAN:  1.    Current Discharge Medication List      START taking these medications    Details   predniSONE (STERAPRED DS) 10 mg dose pack Standard 6 day taper  Qty: 21 Tablet, Refills: 0  Start date: 9/28/2021      methocarbamoL (Robaxin) 500 mg tablet Take 1 Tablet by mouth four (4) times daily as needed for Muscle Spasm(s). Qty: 20 Tablet, Refills: 0  Start date: 9/28/2021           2. Follow-up Information     Follow up With Specialties Details Why Contact Info    Gabriela Mondragon MD Wound Care In 1 week As needed [address] [facility]      Ryan Richter MD Orthopedic Surgery In 1 week As needed [address]  [address] [facility] [address]  [address] [facility]  [phone]      [facility] EMERGENCY DEPT Emergency Medicine  If symptoms worsen [address]  [address] [facility]  [phone]        Return to ED if worse     Diagnosis     Clinical Impression:   1. Acute exacerbation of chronic low back pain          Please note that this dictation was completed with [employer], the computer voice recognition software. Quite often unanticipated grammatical, syntax, homophones, and other interpretive errors are inadvertently transcribed by the computer software. Please disregards these errors. Please excuse any errors that have escaped final proofreading.

## 2021-10-05 ENCOUNTER — TRANSCRIBE ORDER (OUTPATIENT)
Dept: SCHEDULING | Age: 81
End: 2021-10-05

## 2021-10-05 DIAGNOSIS — S32.040G: Primary | ICD-10-CM

## 2021-10-05 DIAGNOSIS — M41.80 DEXTROSCOLIOSIS: ICD-10-CM

## 2021-10-05 DIAGNOSIS — M47.817 LUMBOSACRAL SPONDYLOSIS WITHOUT MYELOPATHY: ICD-10-CM

## 2021-10-05 DIAGNOSIS — M54.50 LUMBAGO: ICD-10-CM

## 2021-10-18 ENCOUNTER — HOSPITAL ENCOUNTER (OUTPATIENT)
Dept: MRI IMAGING | Age: 81
Discharge: HOME | End: 2021-10-18
Attending: NURSE PRACTITIONER
Payer: MEDICARE

## 2021-10-18 DIAGNOSIS — M54.50 LUMBAGO: ICD-10-CM

## 2021-10-18 DIAGNOSIS — M47.817 LUMBOSACRAL SPONDYLOSIS WITHOUT MYELOPATHY: ICD-10-CM

## 2021-10-18 DIAGNOSIS — S32.040G: ICD-10-CM

## 2021-10-18 DIAGNOSIS — M41.80 DEXTROSCOLIOSIS: ICD-10-CM

## 2021-10-18 PROCEDURE — 72148 MRI LUMBAR SPINE W/O DYE: CPT

## 2022-03-18 PROBLEM — S32.009A LUMBAR VERTEBRAL FRACTURE (HCC): Status: ACTIVE | Noted: 2020-10-14

## 2022-05-20 ENCOUNTER — HOSPITAL ENCOUNTER (INPATIENT)
Age: 82
LOS: 7 days | Discharge: NURSING, REHAB OR LONG-TERM CARE | DRG: 378 | End: 2022-05-27
Attending: EMERGENCY MEDICINE | Admitting: STUDENT IN AN ORGANIZED HEALTH CARE EDUCATION/TRAINING PROGRAM
Payer: MEDICARE

## 2022-05-20 ENCOUNTER — APPOINTMENT (OUTPATIENT)
Dept: CT IMAGING | Age: 82
DRG: 378 | End: 2022-05-20
Attending: EMERGENCY MEDICINE
Payer: MEDICARE

## 2022-05-20 ENCOUNTER — APPOINTMENT (OUTPATIENT)
Dept: CT IMAGING | Age: 82
DRG: 378 | End: 2022-05-20
Attending: STUDENT IN AN ORGANIZED HEALTH CARE EDUCATION/TRAINING PROGRAM
Payer: MEDICARE

## 2022-05-20 DIAGNOSIS — K92.2 GASTROINTESTINAL HEMORRHAGE, UNSPECIFIED GASTROINTESTINAL HEMORRHAGE TYPE: Primary | ICD-10-CM

## 2022-05-20 DIAGNOSIS — S32.019D CLOSED FRACTURE OF FIRST LUMBAR VERTEBRA WITH ROUTINE HEALING, UNSPECIFIED FRACTURE MORPHOLOGY, SUBSEQUENT ENCOUNTER: ICD-10-CM

## 2022-05-20 DIAGNOSIS — R60.9 PERIPHERAL EDEMA: ICD-10-CM

## 2022-05-20 PROBLEM — D64.9 SYMPTOMATIC ANEMIA: Status: ACTIVE | Noted: 2022-05-20

## 2022-05-20 LAB
ALBUMIN SERPL-MCNC: 1.9 G/DL (ref 3.5–5)
ALBUMIN/GLOB SERPL: 0.5 {RATIO} (ref 1.1–2.2)
ALP SERPL-CCNC: 75 U/L (ref 45–117)
ALT SERPL-CCNC: 17 U/L (ref 12–78)
AMORPH CRY URNS QL MICRO: ABNORMAL
ANION GAP SERPL CALC-SCNC: 2 MMOL/L (ref 5–15)
APPEARANCE UR: CLEAR
AST SERPL-CCNC: 23 U/L (ref 15–37)
BACTERIA URNS QL MICRO: NEGATIVE /HPF
BASOPHILS # BLD: 0 K/UL (ref 0–0.1)
BASOPHILS NFR BLD: 0 % (ref 0–1)
BILIRUB SERPL-MCNC: 1 MG/DL (ref 0.2–1)
BILIRUB UR QL: NEGATIVE
BNP SERPL-MCNC: 360 PG/ML
BUN SERPL-MCNC: 42 MG/DL (ref 6–20)
BUN/CREAT SERPL: 26 (ref 12–20)
CALCIUM SERPL-MCNC: 8.1 MG/DL (ref 8.5–10.1)
CHLORIDE SERPL-SCNC: 103 MMOL/L (ref 97–108)
CO2 SERPL-SCNC: 34 MMOL/L (ref 21–32)
COLOR UR: ABNORMAL
CREAT SERPL-MCNC: 1.61 MG/DL (ref 0.55–1.02)
DIFFERENTIAL METHOD BLD: ABNORMAL
EOSINOPHIL # BLD: 0.1 K/UL (ref 0–0.4)
EOSINOPHIL NFR BLD: 2 % (ref 0–7)
EPITH CASTS URNS QL MICRO: ABNORMAL /LPF
ERYTHROCYTE [DISTWIDTH] IN BLOOD BY AUTOMATED COUNT: 17.6 % (ref 11.5–14.5)
GLOBULIN SER CALC-MCNC: 3.6 G/DL (ref 2–4)
GLUCOSE SERPL-MCNC: 157 MG/DL (ref 65–100)
GLUCOSE UR STRIP.AUTO-MCNC: NEGATIVE MG/DL
HCT VFR BLD AUTO: 20.5 % (ref 35–47)
HEMOCCULT STL QL: POSITIVE
HGB BLD-MCNC: 5.9 G/DL (ref 11.5–16)
HGB UR QL STRIP: NEGATIVE
HISTORY CHECKED?,CKHIST: NORMAL
HYALINE CASTS URNS QL MICRO: ABNORMAL /LPF (ref 0–5)
IMM GRANULOCYTES # BLD AUTO: 0 K/UL (ref 0–0.04)
IMM GRANULOCYTES NFR BLD AUTO: 1 % (ref 0–0.5)
INR PPP: 1.3 (ref 0.9–1.1)
KETONES UR QL STRIP.AUTO: NEGATIVE MG/DL
LEUKOCYTE ESTERASE UR QL STRIP.AUTO: NEGATIVE
LIPASE SERPL-CCNC: 123 U/L (ref 73–393)
LYMPHOCYTES # BLD: 0.9 K/UL (ref 0.8–3.5)
LYMPHOCYTES NFR BLD: 20 % (ref 12–49)
MCH RBC QN AUTO: 27.8 PG (ref 26–34)
MCHC RBC AUTO-ENTMCNC: 28.8 G/DL (ref 30–36.5)
MCV RBC AUTO: 96.7 FL (ref 80–99)
MONOCYTES # BLD: 0.5 K/UL (ref 0–1)
MONOCYTES NFR BLD: 11 % (ref 5–13)
NEUTS SEG # BLD: 2.9 K/UL (ref 1.8–8)
NEUTS SEG NFR BLD: 67 % (ref 32–75)
NITRITE UR QL STRIP.AUTO: NEGATIVE
NRBC # BLD: 0 K/UL (ref 0–0.01)
NRBC BLD-RTO: 0 PER 100 WBC
PH UR STRIP: 5 [PH] (ref 5–8)
PLATELET # BLD AUTO: 139 K/UL (ref 150–400)
PMV BLD AUTO: 11.7 FL (ref 8.9–12.9)
POTASSIUM SERPL-SCNC: 3.7 MMOL/L (ref 3.5–5.1)
PROT SERPL-MCNC: 5.5 G/DL (ref 6.4–8.2)
PROT UR STRIP-MCNC: NEGATIVE MG/DL
PROTHROMBIN TIME: 13.1 SEC (ref 9–11.1)
RBC # BLD AUTO: 2.12 M/UL (ref 3.8–5.2)
RBC #/AREA URNS HPF: ABNORMAL /HPF (ref 0–5)
SODIUM SERPL-SCNC: 139 MMOL/L (ref 136–145)
SP GR UR REFRACTOMETRY: 1.02
TROPONIN-HIGH SENSITIVITY: 9 NG/L (ref 0–51)
UA: UC IF INDICATED,UAUC: ABNORMAL
UROBILINOGEN UR QL STRIP.AUTO: 0.2 EU/DL (ref 0.2–1)
WBC # BLD AUTO: 4.4 K/UL (ref 3.6–11)
WBC URNS QL MICRO: ABNORMAL /HPF (ref 0–4)

## 2022-05-20 PROCEDURE — 83690 ASSAY OF LIPASE: CPT

## 2022-05-20 PROCEDURE — 74176 CT ABD & PELVIS W/O CONTRAST: CPT

## 2022-05-20 PROCEDURE — 36415 COLL VENOUS BLD VENIPUNCTURE: CPT

## 2022-05-20 PROCEDURE — 99285 EMERGENCY DEPT VISIT HI MDM: CPT

## 2022-05-20 PROCEDURE — 85025 COMPLETE CBC W/AUTO DIFF WBC: CPT

## 2022-05-20 PROCEDURE — 82272 OCCULT BLD FECES 1-3 TESTS: CPT

## 2022-05-20 PROCEDURE — 84484 ASSAY OF TROPONIN QUANT: CPT

## 2022-05-20 PROCEDURE — 83880 ASSAY OF NATRIURETIC PEPTIDE: CPT

## 2022-05-20 PROCEDURE — 86900 BLOOD TYPING SEROLOGIC ABO: CPT

## 2022-05-20 PROCEDURE — 74011250636 HC RX REV CODE- 250/636: Performed by: STUDENT IN AN ORGANIZED HEALTH CARE EDUCATION/TRAINING PROGRAM

## 2022-05-20 PROCEDURE — 86923 COMPATIBILITY TEST ELECTRIC: CPT

## 2022-05-20 PROCEDURE — 36430 TRANSFUSION BLD/BLD COMPNT: CPT

## 2022-05-20 PROCEDURE — 93005 ELECTROCARDIOGRAM TRACING: CPT

## 2022-05-20 PROCEDURE — 74011000250 HC RX REV CODE- 250: Performed by: STUDENT IN AN ORGANIZED HEALTH CARE EDUCATION/TRAINING PROGRAM

## 2022-05-20 PROCEDURE — P9016 RBC LEUKOCYTES REDUCED: HCPCS

## 2022-05-20 PROCEDURE — 70450 CT HEAD/BRAIN W/O DYE: CPT

## 2022-05-20 PROCEDURE — 30233N1 TRANSFUSION OF NONAUTOLOGOUS RED BLOOD CELLS INTO PERIPHERAL VEIN, PERCUTANEOUS APPROACH: ICD-10-PCS | Performed by: STUDENT IN AN ORGANIZED HEALTH CARE EDUCATION/TRAINING PROGRAM

## 2022-05-20 PROCEDURE — 80053 COMPREHEN METABOLIC PANEL: CPT

## 2022-05-20 PROCEDURE — 81001 URINALYSIS AUTO W/SCOPE: CPT

## 2022-05-20 PROCEDURE — 65270000029 HC RM PRIVATE

## 2022-05-20 PROCEDURE — C9113 INJ PANTOPRAZOLE SODIUM, VIA: HCPCS | Performed by: STUDENT IN AN ORGANIZED HEALTH CARE EDUCATION/TRAINING PROGRAM

## 2022-05-20 PROCEDURE — 74011250637 HC RX REV CODE- 250/637: Performed by: STUDENT IN AN ORGANIZED HEALTH CARE EDUCATION/TRAINING PROGRAM

## 2022-05-20 PROCEDURE — 85610 PROTHROMBIN TIME: CPT

## 2022-05-20 RX ORDER — SODIUM CHLORIDE 9 MG/ML
75 INJECTION, SOLUTION INTRAVENOUS CONTINUOUS
Status: DISCONTINUED | OUTPATIENT
Start: 2022-05-20 | End: 2022-05-20

## 2022-05-20 RX ORDER — PENTOXIFYLLINE 400 MG/1
400 TABLET, EXTENDED RELEASE ORAL 2 TIMES DAILY
Status: DISCONTINUED | OUTPATIENT
Start: 2022-05-20 | End: 2022-05-27 | Stop reason: HOSPADM

## 2022-05-20 RX ORDER — ACETAMINOPHEN 325 MG/1
650 TABLET ORAL
Status: DISCONTINUED | OUTPATIENT
Start: 2022-05-20 | End: 2022-05-27 | Stop reason: HOSPADM

## 2022-05-20 RX ORDER — POLYETHYLENE GLYCOL 3350 17 G/17G
17 POWDER, FOR SOLUTION ORAL DAILY PRN
Status: DISCONTINUED | OUTPATIENT
Start: 2022-05-20 | End: 2022-05-27 | Stop reason: HOSPADM

## 2022-05-20 RX ORDER — SODIUM CHLORIDE 9 MG/ML
250 INJECTION, SOLUTION INTRAVENOUS AS NEEDED
Status: DISCONTINUED | OUTPATIENT
Start: 2022-05-20 | End: 2022-05-23 | Stop reason: ALTCHOICE

## 2022-05-20 RX ORDER — TRAZODONE HYDROCHLORIDE 50 MG/1
25 TABLET ORAL
Status: DISCONTINUED | OUTPATIENT
Start: 2022-05-20 | End: 2022-05-27 | Stop reason: HOSPADM

## 2022-05-20 RX ORDER — FUROSEMIDE 10 MG/ML
20 INJECTION INTRAMUSCULAR; INTRAVENOUS ONCE
Status: COMPLETED | OUTPATIENT
Start: 2022-05-20 | End: 2022-05-20

## 2022-05-20 RX ORDER — SODIUM CHLORIDE 0.9 % (FLUSH) 0.9 %
5-40 SYRINGE (ML) INJECTION AS NEEDED
Status: DISCONTINUED | OUTPATIENT
Start: 2022-05-20 | End: 2022-05-23 | Stop reason: SDUPTHER

## 2022-05-20 RX ORDER — CITALOPRAM 20 MG/1
20 TABLET, FILM COATED ORAL EVERY EVENING
Status: DISCONTINUED | OUTPATIENT
Start: 2022-05-20 | End: 2022-05-27 | Stop reason: HOSPADM

## 2022-05-20 RX ORDER — ACETAMINOPHEN 650 MG/1
650 SUPPOSITORY RECTAL
Status: DISCONTINUED | OUTPATIENT
Start: 2022-05-20 | End: 2022-05-27 | Stop reason: HOSPADM

## 2022-05-20 RX ORDER — CARVEDILOL 3.12 MG/1
3.12 TABLET ORAL 2 TIMES DAILY WITH MEALS
Status: DISCONTINUED | OUTPATIENT
Start: 2022-05-20 | End: 2022-05-27 | Stop reason: HOSPADM

## 2022-05-20 RX ORDER — ONDANSETRON 2 MG/ML
4 INJECTION INTRAMUSCULAR; INTRAVENOUS
Status: DISCONTINUED | OUTPATIENT
Start: 2022-05-20 | End: 2022-05-27 | Stop reason: HOSPADM

## 2022-05-20 RX ORDER — SODIUM CHLORIDE 0.9 % (FLUSH) 0.9 %
5-40 SYRINGE (ML) INJECTION EVERY 8 HOURS
Status: DISCONTINUED | OUTPATIENT
Start: 2022-05-20 | End: 2022-05-23 | Stop reason: SDUPTHER

## 2022-05-20 RX ORDER — ONDANSETRON 4 MG/1
4 TABLET, ORALLY DISINTEGRATING ORAL
Status: DISCONTINUED | OUTPATIENT
Start: 2022-05-20 | End: 2022-05-27 | Stop reason: HOSPADM

## 2022-05-20 RX ORDER — FOLIC ACID 1 MG/1
1 TABLET ORAL DAILY
Status: DISCONTINUED | OUTPATIENT
Start: 2022-05-21 | End: 2022-05-27 | Stop reason: HOSPADM

## 2022-05-20 RX ORDER — ATORVASTATIN CALCIUM 10 MG/1
10 TABLET, FILM COATED ORAL
Status: DISCONTINUED | OUTPATIENT
Start: 2022-05-20 | End: 2022-05-27 | Stop reason: HOSPADM

## 2022-05-20 RX ADMIN — TRAZODONE HYDROCHLORIDE 25 MG: 50 TABLET ORAL at 21:56

## 2022-05-20 RX ADMIN — FUROSEMIDE 20 MG: 10 INJECTION, SOLUTION INTRAMUSCULAR; INTRAVENOUS at 18:08

## 2022-05-20 RX ADMIN — ATORVASTATIN CALCIUM 10 MG: 10 TABLET, FILM COATED ORAL at 21:58

## 2022-05-20 RX ADMIN — SODIUM CHLORIDE 40 MG: 9 INJECTION INTRAMUSCULAR; INTRAVENOUS; SUBCUTANEOUS at 21:59

## 2022-05-20 RX ADMIN — CITALOPRAM HYDROBROMIDE 20 MG: 20 TABLET ORAL at 18:09

## 2022-05-20 RX ADMIN — SODIUM CHLORIDE, PRESERVATIVE FREE 10 ML: 5 INJECTION INTRAVENOUS at 18:09

## 2022-05-20 RX ADMIN — PENTOXIFYLLINE 400 MG: 400 TABLET, EXTENDED RELEASE ORAL at 18:23

## 2022-05-20 NOTE — ED NOTES
Patient is being transferred to \[facility]\"" General Surgery, Room # 3723. Report given to Ryan Kirk RN on [place] for routine progression of care. Report consisted of the following information SBAR, Kardex, ED Summary, Intake/Output and MAR. Patient transferred to receiving unit by: Ryan Kirk RN (RN or tech name). Outstanding consults needed: No     Next labs due: Yes    The following personal items will be sent with the patient during transfer to the floor: All valuables:    Cardiac monitoring ordered: Yes    The following CURRENT information was reported to the receiving RN:    Code status: Full Code at time of transfer    Last set of vital signs:  Vital Signs  Level of Consciousness: Alert (0) (05/20/22 1519)  Temp: 98.2 °F (36.8 °C) (05/20/22 1519)  Temp Source: Oral (05/20/22 1519)  Pulse (Heart Rate): (!) 52 (05/20/22 1614)  Heart Rate Source: Monitor (05/20/22 1519)  Cardiac Rhythm: Sinus Javy (05/20/22 1302)  Resp Rate: 18 (05/20/22 1614)  BP: (!) 122/47 (05/20/22 1614)  MAP (Monitor): 65 (05/20/22 1614)  MAP (Calculated): 72 (05/20/22 1614)  BP 1 Location: Left upper arm (05/20/22 1519)  BP 1 Method: Automatic (05/20/22 1519)  BP Patient Position: At rest (05/20/22 1519)  MEWS Score: 1 (05/20/22 1519)         Oxygen Therapy  O2 Sat (%): 96 % (05/20/22 1614)  Pulse via Oximetry: 52 beats per minute (05/20/22 1614)  O2 Device: None (Room air) (05/20/22 1221)      Last pain assessment:  Pain 1  Pain Scale 1: Numeric (0 - 10)  Pain Intensity 1: 8      Wounds: No     Urinary catheter: due to void  Is there a vega order: No     LDAs:       Peripheral IV 05/20/22 Left Antecubital (Active)   Site Assessment Clean, dry, & intact 05/20/22 1313   Phlebitis Assessment 0 05/20/22 1313   Infiltration Assessment 0 05/20/22 1313   Dressing Status Clean, dry, & intact 05/20/22 1313   Hub Color/Line Status Pink 05/20/22 1313         Opportunity for questions and clarification was provided.     Alecia Dears, RN

## 2022-05-20 NOTE — PROGRESS NOTES
Problem: Falls - Risk of  Goal: *Absence of Falls  Description: Document Mago Yanes Fall Risk and appropriate interventions in the flowsheet. Outcome: Progressing Towards Goal  Note: Fall Risk Interventions:  Mobility Interventions: Assess mobility with egress test,Communicate number of staff needed for ambulation/transfer,OT consult for ADLs,Patient to call before getting OOB,Strengthening exercises (ROM-active/passive),PT Consult for mobility concerns,PT Consult for assist device competence,Bed/chair exit alarm,Utilize walker, cane, or other assistive device,Utilize gait belt for transfers/ambulation         Medication Interventions: Assess postural VS orthostatic hypotension,Evaluate medications/consider consulting pharmacy,Patient to call before getting OOB,Teach patient to arise slowly,Utilize gait belt for transfers/ambulation,Bed/chair exit alarm         History of Falls Interventions: Consult care management for discharge planning,Door open when patient unattended,Evaluate medications/consider consulting pharmacy,Bed/chair exit alarm,Room close to nurse's station,Utilize gait belt for transfer/ambulation,Vital signs minimum Q4HRs X 24 hrs (comment for end date),Assess for delayed presentation/identification of injury for 48 hrs (comment for end date)         Problem: Pressure Injury - Risk of  Goal: *Prevention of pressure injury  Description: Document Zachary Scale and appropriate interventions in the flowsheet.   Outcome: Progressing Towards Goal  Note: Pressure Injury Interventions:  Sensory Interventions: Assess changes in LOC,Assess need for specialty bed,Avoid rigorous massage over bony prominences,Check visual cues for pain,Float heels,Discuss PT/OT consult with provider,Chair cushion,Keep linens dry and wrinkle-free,Maintain/enhance activity level,Minimize linen layers,Monitor skin under medical devices,Pad between skin to skin    Moisture Interventions: Absorbent underpads,Apply protective barrier, creams and emollients,Assess need for specialty bed,Check for incontinence Q2 hours and as needed,Contain wound drainage,Internal/External urinary devices,Limit adult briefs,Maintain skin hydration (lotion/cream),Minimize layers,Moisture barrier,Offer toileting Q_hr    Activity Interventions: Assess need for specialty bed,Chair cushion,Increase time out of bed,Pressure redistribution bed/mattress(bed type),PT/OT evaluation    Mobility Interventions: Assess need for specialty bed,Chair cushion,Float heels,HOB 30 degrees or less,Pressure redistribution bed/mattress (bed type),PT/OT evaluation,Turn and reposition approx.  every two hours(pillow and wedges)    Nutrition Interventions: Document food/fluid/supplement intake,Discuss nutritional consult with provider,Offer support with meals,snacks and hydration    Friction and Shear Interventions: Apply protective barrier, creams and emollients,Feet elevated on foot rest,Foam dressings/transparent film/skin sealants,HOB 30 degrees or less,Lift sheet,Lift team/patient mobility team,Minimize layers                Problem: Patient Education: Go to Patient Education Activity  Goal: Patient/Family Education  Outcome: Progressing Towards Goal

## 2022-05-20 NOTE — PROGRESS NOTES
Dr. Izaiah Bliss notified via perfect serve for diet order since there currently is not one ordered. Call back number [phone] given.

## 2022-05-20 NOTE — CONSULTS
GI CONSULTATION NOTE   Dimitris Garcia, ANGI  [phone] NP in-hospital cell phone M-F until 4:30  After 5pm or on weekends, please call  for physician on call    NAME: Ponce Greco   :  1940   MRN:  [id number]   Attending: Dr. Izaiah Bliss  Primary GI: Dr. Patricia Zamora  Date/Time:  2022 4:27 PM  Assessment:   -Acute on chronic anemia  · Black stools and hemoccult +, however on PO iron  · H/o chronic anemia, last work up   · Intermittent N/V  -CKD  -Confusion, per patient's daughter  -R arm hematoma, s/p fall with broken arm, arm in sling    GI Hx  - M2A normal with gastritis  - CLN normal  - EGD with hemorrhagic gastritis per note  Plan:   -Plan for EGD on Monday with Dr. Manuel Leo  -Will need hemoglobin >/= to 7 to be able to proceed for procedure  -PPI 40mgBID  -Avoid NSAIDs  -Supportive measure per primary team. Monitor for overt S&S of GI bleed  -Trend hemoglobin, goal hemoglobin >7, transfuse as clinically indicated  -Will see on request over the weekend and resume care on Monday. Please call GI with any further questions or concerns. Thank you! Plan discussed with Dr. Patricia Zamora  Subjective:     HISTORY OF PRESENT ILLNESS:     Ponce Greco is an 78 y.o.  female who we are asked to see for complaint of anemia, black tarrry stool. Patients daughter reports patient fell last week and broke her arm. Is swollen. Confused and weak. Has been having intermittent vomiting. On PO iron with black stools for a \"long time. \" Saw PCP yesterday and was told hemoglobin was low and to go to the ER for further evaluation. Reports has had anemia and it has been steadily dropping. Healthy has been slowly declining per patient's daughter. Last bowel movement was yesterday. No NSAIDs. Increased swelling in BLE. No blood thinners. Reports lots of bruising on broken arm. Not currently on any antacid. Decreased appetite. Weight stable, has had increased fluid and edema.       Past Medical History:   Diagnosis Date    Chronic pain     hip, back    Depression     Diabetes (HCC)     GERD (gastroesophageal reflux disease)     Hemorrhagic gastritis     10/2013    [place] (hard of hearing)     no hearing aides    Hyperlipemia     Hypertension     Stroke (cerebrum) ([address]     no residual    Stroke (HCC)     No residual says patient      Past Surgical History:   Procedure Laterality Date    HX  SECTION  1979    HX CHOLECYSTECTOMY      IR KYPHOPLASTY LUMBAR  10/19/2020    IR KYPHOPLASTY LUMBAR  10/19/2020    IA COLONOSCOPY FLX DX W/COLLJ SPEC WHEN PFRMD  2013         IA ESOPHAGOGASTRODUODENOSCOPY TRANSORAL DIAGNOSTIC  10/10/2013          Social History     Tobacco Use    Smoking status: Never Smoker    Smokeless tobacco: Never Used   Substance Use Topics    Alcohol use: No      Family History   Problem Relation Age of Onset    Other Other         HTN and DM      Allergies   Allergen Reactions    Aspirin Hives    Codeine Hives and Nausea and Vomiting      Prior to Admission medications    Medication Sig Start Date End Date Taking? Authorizing Provider   traMADoL (ULTRAM) 50 mg tablet Take 50 mg by mouth every eight (8) hours as needed for Pain. Other, MD Lilli   predniSONE (STERAPRED DS) 10 mg dose pack Standard 6 day taper 21   CAROLINE Oconnor   methocarbamoL (Robaxin) 500 mg tablet Take 1 Tablet by mouth four (4) times daily as needed for Muscle Spasm(s). 21   CAROLINE Oconnor   amLODIPine (NORVASC) 10 mg tablet Take 1 Tab by mouth daily. 10/21/20   Juan Carlos Mclaughlin MD   folic acid (FOLVITE) 584 mcg tablet Take 800 mcg by mouth daily. Provider, Historical   magnesium 250 mg tab Take 250 mg by mouth daily (after dinner). Provider, Historical   multivitamin (ONE A DAY) tablet Take 1 Tab by mouth daily. Provider, Historical   carvediloL (Coreg) 3.125 mg tablet Take 3.125 mg by mouth two (2) times daily (with meals).     Provider, Historical   meclizine (ANTIVERT) 12.5 mg tablet Take 6.25 mg by mouth three (3) times daily as needed. Provider, Historical   ascorbic acid, vitamin C, (VITAMIN C) 500 mg tablet Take 500 mg by mouth daily. Provider, Historical   ferrous sulfate (IRON) 325 mg (65 mg iron) EC tablet Take 325 mg by mouth Daily (before breakfast). Provider, Historical   traZODone (DESYREL) 50 mg tablet Take 25 mg by mouth nightly. 8/12/13   Adi Broussard MD   pentoxifylline CR (TRENTAL) 400 mg CR tablet Take 400 mg by mouth two (2) times a day. Provider, Historical   simvastatin (ZOCOR) 20 mg tablet Take 20 mg by mouth nightly. Provider, Historical   fenofibrate nanocrystallized (TRICOR) 145 mg tablet Take 145 mg by mouth nightly. Provider, Historical   citalopram (CELEXA) 20 mg tablet Take 20 mg by mouth every evening.     Provider, Historical       Patient Active Problem List   Diagnosis Code    Stroke ([facility] I63.9    HTN (hypertension) I10    DM (diabetes mellitus) ([facility] E11.9    Other and unspecified hyperlipidemia E78.5    Anemia, unspecified D64.9    Lumbar vertebral fracture ([facility] S32.009A    Acute GI bleeding K92.2    Symptomatic anemia D64.9       REVIEW OF SYSTEMS:    Constitutional: negative fever, negative chills, negative weight loss  Eyes:   negative visual changes  ENT:   negative sore throat, tongue or lip swelling   Respiratory:  negative cough, negative dyspnea  Cards:  negative for chest pain, palpitations, lower extremity edema  GI:   See HPI  :  negative for frequency, dysuria  Integument:  negative for rash and pruritus  Heme:  negative for easy bruising and gum/nose bleeding  Musculoskel: negative for myalgias,  back pain and muscle weakness  Neuro: negative for headaches, dizziness, vertigo  Psych: negative for feelings of anxiety, depression     Pertinent Positives: fatigue, weakness, melena, N/V      Objective:   VITALS:    Visit Vitals  BP (!) 123/45   Pulse (!) 53   Temp 98.2 °F (36.8 °C)   Resp 21   Ht 5' 2\" (1.575 m)   Wt 71.7 kg (158 lb)   SpO2 98%   BMI 28.90 kg/m²       PHYSICAL EXAM:   General:          Alert, WD, WN, cooperative, no distress, appears stated age. [place]    Head:               Normocephalic, without obvious abnormality, atraumatic. Eyes:               Conjunctivae clear and pale, anicteric sclerae. Pupils are equal  Nose:               Nares normal. No drainage   Throat:             Lips, mucosa, and tongue normal.  No Thrush  Neck:               Supple, symmetrical  Back:               Symmetric,  . Lungs:             CTA bilaterally. No wheezing/rhonchi/rales. Chest wall:      No tenderness or deformity. No Accessory muscle use. Heart:              Regular rate and rhythm,  no rub or gallop. Abdomen:        Soft, non-tender. Not distended. Bowel sounds normal. No masses  Extremities:     Atraumatic, No cyanosis. No edema. No clubbing. R arm with extensive bruise from shoulder down to wrist  Skin:                Texture, turgor normal. No rashes/lesions/jaundice  Lymph:            Cervical, supraclavicular normal.  Psych:             Not depressed. Not anxious or agitated. Neurologic:      EOMs intact. No facial asymmetry. No aphasia or slurred speech. Normal strength, A/O X 3.      LAB DATA REVIEWED:    Recent Results (from the past 24 hour(s))   EKG, 12 LEAD, INITIAL    Collection Time: 05/20/22 12:41 PM   Result Value Ref Range    Ventricular Rate 56 BPM    Atrial Rate 56 BPM    P-R Interval 222 ms    QRS Duration 86 ms    Q-T Interval 390 ms    QTC Calculation (Bezet) 376 ms    Calculated P Axis 52 degrees    Calculated R Axis -9 degrees    Calculated T Axis -37 degrees    Diagnosis       Sinus bradycardia with sinus arrhythmia with 1st degree AV block  Cannot rule out Anterior infarct , age undetermined  When compared with ECG of 14-OCT-2020 20:43,  GA interval has increased  Nonspecific T wave abnormality, worse in Inferior leads  T wave inversion now evident in Lateral leads  QT has shortened     CBC WITH AUTOMATED DIFF    Collection Time: 05/20/22  1:07 PM   Result Value Ref Range    WBC 4.4 3.6 - 11.0 K/uL    RBC 2.12 (L) 3.80 - 5.20 M/uL    HGB 5.9 (LL) 11.5 - 16.0 g/dL    HCT 20.5 (L) 35.0 - 47.0 %    MCV 96.7 80.0 - 99.0 FL    MCH 27.8 26.0 - 34.0 PG    MCHC 28.8 (L) 30.0 - 36.5 g/dL    RDW 17.6 (H) 11.5 - 14.5 %    PLATELET 597 (L) 871 - 400 K/uL    MPV 11.7 8.9 - 12.9 FL    NRBC 0.0 0  WBC    ABSOLUTE NRBC 0.00 0.00 - 0.01 K/uL    NEUTROPHILS 67 32 - 75 %    LYMPHOCYTES 20 12 - 49 %    MONOCYTES 11 5 - 13 %    EOSINOPHILS 2 0 - 7 %    BASOPHILS 0 0 - 1 %    IMMATURE GRANULOCYTES 1 (H) 0.0 - 0.5 %    ABS. NEUTROPHILS 2.9 1.8 - 8.0 K/UL    ABS. LYMPHOCYTES 0.9 0.8 - 3.5 K/UL    ABS. MONOCYTES 0.5 0.0 - 1.0 K/UL    ABS. EOSINOPHILS 0.1 0.0 - 0.4 K/UL    ABS. BASOPHILS 0.0 0.0 - 0.1 K/UL    ABS. IMM. GRANS. 0.0 0.00 - 0.04 K/UL    DF AUTOMATED     METABOLIC PANEL, COMPREHENSIVE    Collection Time: 05/20/22  1:07 PM   Result Value Ref Range    Sodium 139 136 - 145 mmol/L    Potassium 3.7 3.5 - 5.1 mmol/L    Chloride 103 97 - 108 mmol/L    CO2 34 (H) 21 - 32 mmol/L    Anion gap 2 (L) 5 - 15 mmol/L    Glucose 157 (H) 65 - 100 mg/dL    BUN 42 (H) 6 - 20 MG/DL    Creatinine 1.61 (H) 0.55 - 1.02 MG/DL    BUN/Creatinine ratio 26 (H) 12 - 20      GFR est AA 37 (L) >60 ml/min/1.73m2    GFR est non-AA 31 (L) >60 ml/min/1.73m2    Calcium 8.1 (L) 8.5 - 10.1 MG/DL    Bilirubin, total 1.0 0.2 - 1.0 MG/DL    ALT (SGPT) 17 12 - 78 U/L    AST (SGOT) 23 15 - 37 U/L    Alk.  phosphatase 75 45 - 117 U/L    Protein, total 5.5 (L) 6.4 - 8.2 g/dL    Albumin 1.9 (L) 3.5 - 5.0 g/dL    Globulin 3.6 2.0 - 4.0 g/dL    A-G Ratio 0.5 (L) 1.1 - 2.2     LIPASE    Collection Time: 05/20/22  1:07 PM   Result Value Ref Range    Lipase 123 73 - 393 U/L   TYPE & SCREEN    Collection Time: 05/20/22  1:07 PM   Result Value Ref Range    Crossmatch Expiration 05/23/2022,2355     ABO/Rh(D) O POSITIVE     Antibody screen NEG     Unit number [id number]     Blood component type  LR     Unit division 00     Status of unit ISSUED     Crossmatch result Compatible     Unit number [id number]     Blood component type  LR     Unit division 00     Status of unit ALLOCATED     Crossmatch result Compatible    PROTHROMBIN TIME + INR    Collection Time: 05/20/22  1:07 PM   Result Value Ref Range    INR 1.3 (H) 0.9 - 1.1      Prothrombin time 13.1 (H) 9.0 - 11.1 sec   TROPONIN-HIGH SENSITIVITY    Collection Time: 05/20/22  1:07 PM   Result Value Ref Range    Troponin-High Sensitivity 9 0 - 51 ng/L   NT-PRO BNP    Collection Time: 05/20/22  1:07 PM   Result Value Ref Range    NT pro- <450 PG/ML   RBC, ALLOCATE    Collection Time: 05/20/22  2:15 PM   Result Value Ref Range    HISTORY CHECKED?  Historical check performed    URINALYSIS W/ REFLEX CULTURE    Collection Time: 05/20/22  3:19 PM    Specimen: Urine   Result Value Ref Range    Color YELLOW/STRAW      Appearance CLEAR CLEAR      Specific gravity 1.018      pH (UA) 5.0 5.0 - 8.0      Protein Negative NEG mg/dL    Glucose Negative NEG mg/dL    Ketone Negative NEG mg/dL    Bilirubin Negative NEG      Blood Negative NEG      Urobilinogen 0.2 0.2 - 1.0 EU/dL    Nitrites Negative NEG      Leukocyte Esterase Negative NEG      WBC 0-4 0 - 4 /hpf    RBC 0-5 0 - 5 /hpf    Epithelial cells FEW FEW /lpf    Bacteria Negative NEG /hpf    UA:UC IF INDICATED CULTURE NOT INDICATED BY UA RESULT CNI      Amorphous Crystals 1+ (A) NEG    Hyaline cast 2-5 0 - 5 /lpf   OCCULT BLOOD, STOOL    Collection Time: 05/20/22  3:38 PM   Result Value Ref Range    Occult blood, stool Positive (A) NEG         IMAGING RESULTS:  I have personally reviewed the imaging reports      Total time spent with patient: 30 minutes ________________________________________________________________________  Care Plan discussed with:  Patient Y   Family  Y   RN               Consultant:       CT 5/20/2022:  ________________________________________________________________________    ___________________________________________________  Consulting Provider: Brannon Farfan NP      5/20/2022  4:27 PM

## 2022-05-20 NOTE — PROGRESS NOTES
End of Shift Note    Bedside shift change report given to [id number] (oncoming nurse) by Fleet Opitz, RN (offgoing nurse). Report included the following information SBAR, Kardex, Intake/Output, MAR, Recent Results and Cardiac Rhythm cortez    Shift worked:  day     Shift summary and any significant changes:     1 unit of prbc given in ED, 1 unit given upon arrival to room, coreg held due to pulse being 49-50's. purewicc set up for voiding. Bruising and swelling noted to R arm, blanchable pinkness to R groin, sacrum, and L heel upon admission, bed alarm present due to previous falls at home. Concerns for physician to address:       Zone phone for oncoming shift:          Activity:     Number times ambulated in hallways past shift: 0  Number of times OOB to chair past shift: 0    Cardiac:   Cardiac Monitoring: Yes      Cardiac Rhythm: Sinus Cortez    Access:   Current line(s): PIV     Genitourinary:   Urinary status: voiding    Respiratory:   O2 Device: None (Room air)  Chronic home O2 use?: NO  Incentive spirometer at bedside: n/a       GI:     Current diet:  No diet orders on file  Passing flatus: YES  Tolerating current diet: YES       Pain Management:   Patient states pain is manageable on current regimen: YES    Skin:     Interventions: float heels, increase time out of bed, PT/OT consult and limit briefs    Patient Safety:  Fall Score:  Total Score: 3  Interventions: bed/chair alarm, assistive device (walker, cane, etc), gripper socks, pt to call before getting OOB, stay with me (per policy) and gait belt       Length of Stay:  Expected LOS: - - -  Actual LOS: 0      Melissa Rojo RN

## 2022-05-20 NOTE — H&P
Hospitalist Admission Note    NAME: Roselia Romo   :  1940   MRN:  [id number]     Date/Time:  2022 5:27 PM    Patient PCP: Saman Garcia MD  ______________________________________________________________________  Given the patient's current clinical presentation, I have a high level of concern for decompensation if discharged from the emergency department. Complex decision making was performed, which includes reviewing the patient's available past medical records, laboratory results, and x-ray films. My assessment of this patient's clinical condition and my plan of care is as follows. Assessment / Plan:    Acute GI bleed  Nausea and vomiting  - CT abdomen/pelvis-pending.  - FOBT positive. - Hemoglobin 5.9, getting 1 unit of PRBC in the ER.  - GI consulted. - Holding tramadol, started on Protonix 40 mg IV twice daily.  - History of iron deficiency anemia, ordered iron profile along with ferritin in the morning.  - N.p.o. after midnight. - Clear liquid diet today. Bilateral lower extremity edema  - Patient echo done at [facility] in 2021. Discussed with the family they will get us the report tomorrow. - . - We will give 20 units of IV Lasix as patient got blood also. - Order chest x-ray. - Has stockings on.  - PT and OT consulted. Acute kidney injury  - Creatinine 1.61, not giving any IV fluids because of significant lower extremity edema.  - Repeat BMP in morning. Right humerus fracture from the fall few days ago  - Patient has a follow-up with orthopedics on 6/3. Last appointment few days ago recommended sling. According to the orthopedics patient not a good candidate for surgery. Hypertension  Anxiety disorder  Hyperlipidemia  Insomnia  - Continue the home medications.       Code Status: Full code  Surrogate Decision Maker: Daughter    DVT Prophylaxis: SCD  GI Prophylaxis: not indicated    Baseline: Ambulatory with assistance at home Subjective:   CHIEF COMPLAINT: Abnormal labs, nausea and vomiting    HISTORY OF PRESENT ILLNESS:     Shireen Tolentino is a 78 y.o.  female who presents with abnormal labs with low hemoglobin along with nausea and vomiting. Patient past medical history of iron deficiency anemia, hypertension, anxiety disorder, insomnia, hyperlipidemia. Patient had a fall few days ago and got a right humerus fracture. Patient had a follow-up appointment with PCP yesterday and got a lab work done. Last night patient got a call from the primary care doctor that hemoglobin is low and she needs to come to the hospital for evaluation. Currently the patient is doing fine had no chest pain or shortness of breath has on and off nausea with vomiting-by looking according to the daughter. No fever or chills, no headache, no dizziness, no passing out episodes. Patient is swelling the lower legs and has been on stockings. We were asked to admit for work up and evaluation of the above problems.      Past Medical History:   Diagnosis Date    Chronic pain     hip, back    Depression     Diabetes (HCC)     GERD (gastroesophageal reflux disease)     Hemorrhagic gastritis     10/2013    [place] (hard of hearing)     no hearing aides    Hyperlipemia     Hypertension     Stroke (cerebrum) ([address]     no residual    Stroke (HCC)     No residual says patient        Past Surgical History:   Procedure Laterality Date    HX  SECTION      HX CHOLECYSTECTOMY      IR KYPHOPLASTY LUMBAR  10/19/2020    IR KYPHOPLASTY LUMBAR  10/19/2020    TN COLONOSCOPY FLX DX W/COLLJ SPEC WHEN PFRMD  2013         TN ESOPHAGOGASTRODUODENOSCOPY TRANSORAL DIAGNOSTIC  10/10/2013            Social History     Tobacco Use    Smoking status: Never Smoker    Smokeless tobacco: Never Used   Substance Use Topics    Alcohol use: No        Family History   Problem Relation Age of Onset    Other Other         HTN and DM     Allergies Allergen Reactions    Aspirin Hives    Codeine Hives and Nausea and Vomiting        Prior to Admission medications    Medication Sig Start Date End Date Taking? Authorizing Provider   traMADoL (ULTRAM) 50 mg tablet Take 50 mg by mouth every eight (8) hours as needed for Pain. Other, MD Lilli   predniSONE (STERAPRED DS) 10 mg dose pack Standard 6 day taper 9/28/21   CAROLINE Walton   methocarbamoL (Robaxin) 500 mg tablet Take 1 Tablet by mouth four (4) times daily as needed for Muscle Spasm(s). 9/28/21   CAROLINE Walton   amLODIPine (NORVASC) 10 mg tablet Take 1 Tab by mouth daily. 10/21/20   Lily Luevano MD   folic acid (FOLVITE) 671 mcg tablet Take 800 mcg by mouth daily. Provider, Historical   magnesium 250 mg tab Take 250 mg by mouth daily (after dinner). Provider, Historical   multivitamin (ONE A DAY) tablet Take 1 Tab by mouth daily. Provider, Historical   carvediloL (Coreg) 3.125 mg tablet Take 3.125 mg by mouth two (2) times daily (with meals). Provider, Historical   meclizine (ANTIVERT) 12.5 mg tablet Take 6.25 mg by mouth three (3) times daily as needed. Provider, Historical   ascorbic acid, vitamin C, (VITAMIN C) 500 mg tablet Take 500 mg by mouth daily. Provider, Historical   ferrous sulfate (IRON) 325 mg (65 mg iron) EC tablet Take 325 mg by mouth Daily (before breakfast). Provider, Historical   traZODone (DESYREL) 50 mg tablet Take 25 mg by mouth nightly. 8/12/13   Neetu Broussard MD   pentoxifylline CR (TRENTAL) 400 mg CR tablet Take 400 mg by mouth two (2) times a day. Provider, Historical   simvastatin (ZOCOR) 20 mg tablet Take 20 mg by mouth nightly. Provider, Historical   fenofibrate nanocrystallized (TRICOR) 145 mg tablet Take 145 mg by mouth nightly. Provider, Historical   citalopram (CELEXA) 20 mg tablet Take 20 mg by mouth every evening.     Provider, Historical       REVIEW OF SYSTEMS:     I am not able to complete the review of systems because: The patient is intubated and sedated    The patient has altered mental status due to his acute medical problems    The patient has baseline aphasia from prior stroke(s)    The patient has baseline dementia and is not reliable historian    The patient is in acute medical distress and unable to provide information           Total of 12 systems reviewed as follows:       POSITIVE= underlined text  Negative = text not underlined  General:  fever, chills, sweats, generalized weakness, weight loss/gain,      loss of appetite   Eyes:    blurred vision, eye pain, loss of vision, double vision  ENT:    rhinorrhea, pharyngitis   Respiratory:   cough, sputum production, SOB, DAO, wheezing, pleuritic pain   Cardiology:   chest pain, palpitations, orthopnea, PND, edema, syncope   Gastrointestinal:  abdominal pain , N/V, diarrhea, dysphagia, constipation, bleeding   Genitourinary:  frequency, urgency, dysuria, hematuria, incontinence   Muskuloskeletal :  arthralgia, myalgia, back pain  Hematology:  easy bruising, nose or gum bleeding, lymphadenopathy   Dermatological: rash, ulceration, pruritis, color change / jaundice  Endocrine:   hot flashes or polydipsia   Neurological:  headache, dizziness, confusion, focal weakness, paresthesia,     Speech difficulties, memory loss, gait difficulty  Psychological: Feelings of anxiety, depression, agitation    Objective:   VITALS:    Visit Vitals  BP (!) 137/50 (BP 1 Location: Left upper arm, BP Patient Position: At rest)   Pulse (!) 53   Temp 98.3 °F (36.8 °C)   Resp 15   Ht 5' 2\" (1.575 m)   Wt 71.7 kg (158 lb)   SpO2 98%   BMI 28.90 kg/m²       PHYSICAL EXAM:    General:    Alert, cooperative, no distress, appears stated age. HEENT: Atraumatic, anicteric sclerae, pale conjunctivae     No oral ulcers, mucosa moist, throat clear, dentition fair  Neck:  Supple, symmetrical,  thyroid: non tender  Lungs:   Clear to auscultation bilaterally. No Wheezing or Rhonchi.  No rales.  Chest wall:  No tenderness  No Accessory muscle use. Heart:   Regular  rhythm,  No  murmur 1+ edema lower extremity  Abdomen:   Soft, non-tender. Not distended. Bowel sounds normal  Extremities: No cyanosis. No clubbing,      Skin turgor normal, Capillary refill normal, Radial dial pulse 2+  Skin:     Not pale. Not Jaundiced  No rashes   Psych:  Good insight. Not depressed. Not anxious or agitated. Neurologic: EOMs intact. No facial asymmetry. No aphasia or slurred speech. Symmetrical strength, Sensation grossly intact. Alert and oriented X 4.     _______________________________________________________________________  Care Plan discussed with:    Comments   Patient X    Family  X  daughter   RN X    Care Manager                    Consultant:  X    _______________________________________________________________________  Expected  Disposition:   Home with Family    HH/PT/OT/RN X   SNF/LTC    MAURI    ________________________________________________________________________  TOTAL TIME: 60 minutes    Critical Care Provided     Minutes non procedure based      Comments     Reviewed previous records   >50% of visit spent in counseling and coordination of care  Discussion with patient and/or family and questions answered       ________________________________________________________________________  Signed: Alfred Jules MD    Procedures: see electronic medical records for all procedures/Xrays and details which were not copied into this note but were reviewed prior to creation of Plan.     LAB DATA REVIEWED:    Recent Results (from the past 24 hour(s))   EKG, 12 LEAD, INITIAL    Collection Time: 05/20/22 12:41 PM   Result Value Ref Range    Ventricular Rate 56 BPM    Atrial Rate 56 BPM    P-R Interval 222 ms    QRS Duration 86 ms    Q-T Interval 390 ms    QTC Calculation (Bezet) 376 ms    Calculated P Axis 52 degrees    Calculated R Axis -9 degrees    Calculated T Axis -37 degrees    Diagnosis       Sinus bradycardia with sinus arrhythmia with 1st degree AV block  Cannot rule out Anterior infarct , age undetermined  When compared with ECG of 14-OCT-2020 20:43,  WI interval has increased  Nonspecific T wave abnormality, worse in Inferior leads  T wave inversion now evident in Lateral leads  QT has shortened     CBC WITH AUTOMATED DIFF    Collection Time: 05/20/22  1:07 PM   Result Value Ref Range    WBC 4.4 3.6 - 11.0 K/uL    RBC 2.12 (L) 3.80 - 5.20 M/uL    HGB 5.9 (LL) 11.5 - 16.0 g/dL    HCT 20.5 (L) 35.0 - 47.0 %    MCV 96.7 80.0 - 99.0 FL    MCH 27.8 26.0 - 34.0 PG    MCHC 28.8 (L) 30.0 - 36.5 g/dL    RDW 17.6 (H) 11.5 - 14.5 %    PLATELET 389 (L) 761 - 400 K/uL    MPV 11.7 8.9 - 12.9 FL    NRBC 0.0 0  WBC    ABSOLUTE NRBC 0.00 0.00 - 0.01 K/uL    NEUTROPHILS 67 32 - 75 %    LYMPHOCYTES 20 12 - 49 %    MONOCYTES 11 5 - 13 %    EOSINOPHILS 2 0 - 7 %    BASOPHILS 0 0 - 1 %    IMMATURE GRANULOCYTES 1 (H) 0.0 - 0.5 %    ABS. NEUTROPHILS 2.9 1.8 - 8.0 K/UL    ABS. LYMPHOCYTES 0.9 0.8 - 3.5 K/UL    ABS. MONOCYTES 0.5 0.0 - 1.0 K/UL    ABS. EOSINOPHILS 0.1 0.0 - 0.4 K/UL    ABS. BASOPHILS 0.0 0.0 - 0.1 K/UL    ABS. IMM. GRANS. 0.0 0.00 - 0.04 K/UL    DF AUTOMATED     METABOLIC PANEL, COMPREHENSIVE    Collection Time: 05/20/22  1:07 PM   Result Value Ref Range    Sodium 139 136 - 145 mmol/L    Potassium 3.7 3.5 - 5.1 mmol/L    Chloride 103 97 - 108 mmol/L    CO2 34 (H) 21 - 32 mmol/L    Anion gap 2 (L) 5 - 15 mmol/L    Glucose 157 (H) 65 - 100 mg/dL    BUN 42 (H) 6 - 20 MG/DL    Creatinine 1.61 (H) 0.55 - 1.02 MG/DL    BUN/Creatinine ratio 26 (H) 12 - 20      GFR est AA 37 (L) >60 ml/min/1.73m2    GFR est non-AA 31 (L) >60 ml/min/1.73m2    Calcium 8.1 (L) 8.5 - 10.1 MG/DL    Bilirubin, total 1.0 0.2 - 1.0 MG/DL    ALT (SGPT) 17 12 - 78 U/L    AST (SGOT) 23 15 - 37 U/L    Alk.  phosphatase 75 45 - 117 U/L    Protein, total 5.5 (L) 6.4 - 8.2 g/dL    Albumin 1.9 (L) 3.5 - 5.0 g/dL    Globulin 3.6 2.0 - 4.0 g/dL    A-G Ratio 0.5 (L) 1.1 - 2.2     LIPASE    Collection Time: 05/20/22  1:07 PM   Result Value Ref Range    Lipase 123 73 - 393 U/L   TYPE & SCREEN    Collection Time: 05/20/22  1:07 PM   Result Value Ref Range    Crossmatch Expiration 05/23/2022,2359     ABO/Rh(D) O POSITIVE     Antibody screen NEG     Unit number [id number]     Blood component type  LR     Unit division 00     Status of unit ISSUED     Crossmatch result Compatible     Unit number [id number]     Blood component type  LR     Unit division 00     Status of unit ALLOCATED     Crossmatch result Compatible    PROTHROMBIN TIME + INR    Collection Time: 05/20/22  1:07 PM   Result Value Ref Range    INR 1.3 (H) 0.9 - 1.1      Prothrombin time 13.1 (H) 9.0 - 11.1 sec   TROPONIN-HIGH SENSITIVITY    Collection Time: 05/20/22  1:07 PM   Result Value Ref Range    Troponin-High Sensitivity 9 0 - 51 ng/L   NT-PRO BNP    Collection Time: 05/20/22  1:07 PM   Result Value Ref Range    NT pro- <450 PG/ML   RBC, ALLOCATE    Collection Time: 05/20/22  2:15 PM   Result Value Ref Range    HISTORY CHECKED?  Historical check performed    URINALYSIS W/ REFLEX CULTURE    Collection Time: 05/20/22  3:19 PM    Specimen: Urine   Result Value Ref Range    Color YELLOW/STRAW      Appearance CLEAR CLEAR      Specific gravity 1.018      pH (UA) 5.0 5.0 - 8.0      Protein Negative NEG mg/dL    Glucose Negative NEG mg/dL    Ketone Negative NEG mg/dL    Bilirubin Negative NEG      Blood Negative NEG      Urobilinogen 0.2 0.2 - 1.0 EU/dL    Nitrites Negative NEG      Leukocyte Esterase Negative NEG      WBC 0-4 0 - 4 /hpf    RBC 0-5 0 - 5 /hpf    Epithelial cells FEW FEW /lpf    Bacteria Negative NEG /hpf    UA:UC IF INDICATED CULTURE NOT INDICATED BY UA RESULT CNI      Amorphous Crystals 1+ (A) NEG    Hyaline cast 2-5 0 - 5 /lpf   OCCULT BLOOD, STOOL    Collection Time: 05/20/22  3:38 PM   Result Value Ref Range    Occult blood, stool Positive (A) NEG

## 2022-05-20 NOTE — ED PROVIDER NOTES
EMERGENCY DEPARTMENT HISTORY AND PHYSICAL EXAM      Date: 5/20/2022  Patient Name: Gianna Burnham    History of Presenting Illness     Chief Complaint   Patient presents with    Fatigue     fell on the 11th and broke her right arm.; pt arm in a sling. pt is pale. pt is retaining fluid; decrease appetite. pt is weak. confused and vomits.  Fall       History Provided By: Patient    HPI: Gianna Burnham, 78 y.o. female with PMHx significant for diabetes, GERD, hypertension, hyperlipidemia, chronic back pain who presents with multiple complaints. Patient had a fall on 5/11 and broke her right humerus. Daughter reports a \"steady decline\" since that time. She states the patient has been retaining more fluid, had increased confusion which is described as a lack of comprehension, fatigue, and has been more generally weak. She has seen orthopedics in follow-up with no plans for surgery at this time. Additionally, saw PCP yesterday and daughter reports that lab work showed a worsening creatinine as well as a hemoglobin of 6. Patient has had black stools but does take iron supplements. Does take lasix at home and has been compliant. No falls since 5/11. PCP: China Tan MD    There are no other complaints, changes, or physical findings at this time.     Current Facility-Administered Medications   Medication Dose Route Frequency Provider Last Rate Last Admin    0.9% sodium chloride infusion 250 mL  250 mL IntraVENous PRN Heidi Can MD        pantoprazole (PROTONIX) 40 mg in 0.9% sodium chloride 10 mL injection  40 mg IntraVENous Q12H MD Peg Dean ON 5/81/9336] folic acid (FOLVITE) tablet 1 mg  1 mg Oral DAILY Mona rGay MD        carvediloL (COREG) tablet 3.125 mg  3.125 mg Oral BID WITH MEALS Mona Gray MD        traZODone (DESYREL) tablet 25 mg  25 mg Oral QHS Mona Gray MD        atorvastatin (LIPITOR) tablet 10 mg  10 mg Oral QHS Regino MD Radha        pentoxifylline CR (TRENTAL) tablet 400 mg  400 mg Oral BID Thirza Riedel, MD   400 mg at 22 1823    citalopram (CELEXA) tablet 20 mg  20 mg Oral QPM Thirza Riedel, MD   20 mg at 22 1809    sodium chloride (NS) flush 5-40 mL  5-40 mL IntraVENous Q8H Thirza Riedel, MD   10 mL at 22 1809    sodium chloride (NS) flush 5-40 mL  5-40 mL IntraVENous PRN Thirza Riedel, MD        acetaminophen (TYLENOL) tablet 650 mg  650 mg Oral Q6H PRN Thirza Riedel, MD        Or   [place] acetaminophen (TYLENOL) suppository 650 mg  650 mg Rectal Q6H PRN Thirza Riedel, MD        polyethylene glycol (MIRALAX) packet 17 g  17 g Oral DAILY PRN Thirza Riedel, MD        ondansetron (ZOFRAN ODT) tablet 4 mg  4 mg Oral Q8H PRN Thirza Riedel, MD        Or    ondansetron (ZOFRAN) injection 4 mg  4 mg IntraVENous Q6H PRN Thirza Riedel, MD         Past History     Past Medical History:  Past Medical History:   Diagnosis Date    Chronic pain     hip, back    Depression     Diabetes ([address]     GERD (gastroesophageal reflux disease)     Hemorrhagic gastritis     10/2013    [place]     no hearing aides    Hyperlipemia     Hypertension     Stroke (cerebrum) ([address]     no residual    Stroke ([address]     No residual says patient     Past Surgical History:  Past Surgical History:   Procedure Laterality Date    HX  SECTION      HX CHOLECYSTECTOMY      IR KYPHOPLASTY LUMBAR  10/19/2020    IR KYPHOPLASTY LUMBAR  10/19/2020    TN COLONOSCOPY FLX DX W/COLLJ SPEC WHEN PFRMD  2013         TN ESOPHAGOGASTRODUODENOSCOPY TRANSORAL DIAGNOSTIC  10/10/2013          Family History:  Family History   Problem Relation Age of Onset    Other Other         HTN and DM     Social History:  Social History     Tobacco Use    Smoking status: Never Smoker    Smokeless tobacco: Never Used   Substance Use Topics    Alcohol use: No    Drug use:  No Allergies: Allergies   Allergen Reactions    Aspirin Hives    Codeine Hives and Nausea and Vomiting     Review of Systems   Review of Systems   Constitutional: Positive for fatigue. Negative for chills and fever. HENT: Negative for congestion, rhinorrhea and sore throat. Respiratory: Negative for cough and shortness of breath. Cardiovascular: Positive for leg swelling. Negative for chest pain. Gastrointestinal: Negative for abdominal pain, nausea and vomiting. Genitourinary: Negative for dysuria and urgency. Skin: Negative for rash. Neurological: Positive for weakness. Negative for dizziness, light-headedness and headaches. Psychiatric/Behavioral: Positive for confusion. All other systems reviewed and are negative. Physical Exam   Physical Exam  Vitals and nursing note reviewed. Constitutional:       General: She is not in acute distress. Appearance: She is well-developed. HENT:      Head: Normocephalic and atraumatic. Eyes:      Conjunctiva/sclera: Conjunctivae normal.      Pupils: Pupils are equal, round, and reactive to light. Cardiovascular:      Rate and Rhythm: Normal rate and regular rhythm. Pulmonary:      Effort: Pulmonary effort is normal. No respiratory distress. Breath sounds: Normal breath sounds. No stridor. Abdominal:      General: There is no distension. Palpations: Abdomen is soft. Tenderness: There is no abdominal tenderness. Musculoskeletal:         General: Normal range of motion. Cervical back: Normal range of motion. Right lower le+ Pitting Edema present. Left lower le+ Pitting Edema present. Comments: RUE in sling, R hand with edema, 2+ radial pulse   Skin:     General: Skin is warm and dry. Neurological:      Mental Status: She is alert and oriented to person, place, and time.        Diagnostic Study Results   Labs -     Recent Results (from the past 12 hour(s))   EKG, 12 LEAD, INITIAL    Collection Time: 05/20/22 12:41 PM   Result Value Ref Range    Ventricular Rate 56 BPM    Atrial Rate 56 BPM    P-R Interval 222 ms    QRS Duration 86 ms    Q-T Interval 390 ms    QTC Calculation (Bezet) 376 ms    Calculated P Axis 52 degrees    Calculated R Axis -9 degrees    Calculated T Axis -37 degrees    Diagnosis       Sinus bradycardia with sinus arrhythmia with 1st degree AV block  Cannot rule out Anterior infarct , age undetermined  When compared with ECG of 14-OCT-2020 20:43,  OR interval has increased  Nonspecific T wave abnormality, worse in Inferior leads  T wave inversion now evident in Lateral leads  QT has shortened     CBC WITH AUTOMATED DIFF    Collection Time: 05/20/22  1:07 PM   Result Value Ref Range    WBC 4.4 3.6 - 11.0 K/uL    RBC 2.12 (L) 3.80 - 5.20 M/uL    HGB 5.9 (LL) 11.5 - 16.0 g/dL    HCT 20.5 (L) 35.0 - 47.0 %    MCV 96.7 80.0 - 99.0 FL    MCH 27.8 26.0 - 34.0 PG    MCHC 28.8 (L) 30.0 - 36.5 g/dL    RDW 17.6 (H) 11.5 - 14.5 %    PLATELET 473 (L) 420 - 400 K/uL    MPV 11.7 8.9 - 12.9 FL    NRBC 0.0 0  WBC    ABSOLUTE NRBC 0.00 0.00 - 0.01 K/uL    NEUTROPHILS 67 32 - 75 %    LYMPHOCYTES 20 12 - 49 %    MONOCYTES 11 5 - 13 %    EOSINOPHILS 2 0 - 7 %    BASOPHILS 0 0 - 1 %    IMMATURE GRANULOCYTES 1 (H) 0.0 - 0.5 %    ABS. NEUTROPHILS 2.9 1.8 - 8.0 K/UL    ABS. LYMPHOCYTES 0.9 0.8 - 3.5 K/UL    ABS. MONOCYTES 0.5 0.0 - 1.0 K/UL    ABS. EOSINOPHILS 0.1 0.0 - 0.4 K/UL    ABS. BASOPHILS 0.0 0.0 - 0.1 K/UL    ABS. IMM.  GRANS. 0.0 0.00 - 0.04 K/UL    DF AUTOMATED     METABOLIC PANEL, COMPREHENSIVE    Collection Time: 05/20/22  1:07 PM   Result Value Ref Range    Sodium 139 136 - 145 mmol/L    Potassium 3.7 3.5 - 5.1 mmol/L    Chloride 103 97 - 108 mmol/L    CO2 34 (H) 21 - 32 mmol/L    Anion gap 2 (L) 5 - 15 mmol/L    Glucose 157 (H) 65 - 100 mg/dL    BUN 42 (H) 6 - 20 MG/DL    Creatinine 1.61 (H) 0.55 - 1.02 MG/DL    BUN/Creatinine ratio 26 (H) 12 - 20      GFR est AA 37 (L) >60 ml/min/1.73m2 GFR est non-AA 31 (L) >60 ml/min/1.73m2    Calcium 8.1 (L) 8.5 - 10.1 MG/DL    Bilirubin, total 1.0 0.2 - 1.0 MG/DL    ALT (SGPT) 17 12 - 78 U/L    AST (SGOT) 23 15 - 37 U/L    Alk. phosphatase 75 45 - 117 U/L    Protein, total 5.5 (L) 6.4 - 8.2 g/dL    Albumin 1.9 (L) 3.5 - 5.0 g/dL    Globulin 3.6 2.0 - 4.0 g/dL    A-G Ratio 0.5 (L) 1.1 - 2.2     LIPASE    Collection Time: 05/20/22  1:07 PM   Result Value Ref Range    Lipase 123 73 - 393 U/L   TYPE & SCREEN    Collection Time: 05/20/22  1:07 PM   Result Value Ref Range    Crossmatch Expiration 05/23/2022,2359     ABO/Rh(D) O POSITIVE     Antibody screen NEG     Unit number [id number]     Blood component type [facility]     Unit division 00     Status of unit ISSUED     Crossmatch result Compatible     Unit number [id number]     Blood component type [facility]     Unit division 00     Status of unit ISSUED     Crossmatch result Compatible    PROTHROMBIN TIME + INR    Collection Time: 05/20/22  1:07 PM   Result Value Ref Range    INR 1.3 (H) 0.9 - 1.1      Prothrombin time 13.1 (H) 9.0 - 11.1 sec   TROPONIN-HIGH SENSITIVITY    Collection Time: 05/20/22  1:07 PM   Result Value Ref Range    Troponin-High Sensitivity 9 0 - 51 ng/L   NT-PRO BNP    Collection Time: 05/20/22  1:07 PM   Result Value Ref Range    NT pro- <450 PG/ML   RBC, ALLOCATE    Collection Time: 05/20/22  2:15 PM   Result Value Ref Range    HISTORY CHECKED?  Historical check performed    URINALYSIS W/ REFLEX CULTURE    Collection Time: 05/20/22  3:19 PM    Specimen: Urine   Result Value Ref Range    Color YELLOW/STRAW      Appearance CLEAR CLEAR      Specific gravity 1.018      pH (UA) 5.0 5.0 - 8.0      Protein Negative NEG mg/dL    Glucose Negative NEG mg/dL    Ketone Negative NEG mg/dL    Bilirubin Negative NEG      Blood Negative NEG      Urobilinogen 0.2 0.2 - 1.0 EU/dL    Nitrites Negative NEG      Leukocyte Esterase Negative NEG      WBC 0-4 0 - 4 /hpf    RBC 0-5 0 - 5 /hpf    Epithelial cells FEW FEW /lpf    Bacteria Negative NEG /hpf    UA:UC IF INDICATED CULTURE NOT INDICATED BY UA RESULT CNI      Amorphous Crystals 1+ (A) NEG    Hyaline cast 2-5 0 - 5 /lpf   OCCULT BLOOD, STOOL    Collection Time: 05/20/22  3:38 PM   Result Value Ref Range    Occult blood, stool Positive (A) NEG         Radiologic Studies -   CT ABD PELV WO CONT   Final Result   1. No acute intra-abdominal pathology. No evidence of bowel obstruction. 2.   Shrunken liver with questionable nodular contour. Question history of   cirrhosis. Abdominal pelvic ascites is noted with splenomegaly. 3.  Cholecystectomy. Common bile duct is mildly dilated which may be physiologic   given the patient's age. Recommend correlation with serum alkaline phosphatase. 4.  Nonobstructing right renal stone      CT HEAD WO CONT   Final Result   Moderate chronic small vessel ischemic disease. No acute intracranial   abnormality              CT HEAD WO CONT    Result Date: 5/20/2022  Moderate chronic small vessel ischemic disease. No acute intracranial abnormality     CT ABD PELV WO CONT    Result Date: 5/20/2022  1. No acute intra-abdominal pathology. No evidence of bowel obstruction. 2.   Shrunken liver with questionable nodular contour. Question history of cirrhosis. Abdominal pelvic ascites is noted with splenomegaly. 3.  Cholecystectomy. Common bile duct is mildly dilated which may be physiologic given the patient's age. Recommend correlation with serum alkaline phosphatase. 4.  Nonobstructing right renal stone    Medical Decision Making   I am the first provider for this patient. I reviewed the vital signs, available nursing notes, past medical history, past surgical history, family history and social history. Vital Signs-Reviewed the patient's vital signs.   Patient Vitals for the past 12 hrs:   Temp Pulse Resp BP SpO2   05/20/22 1838 98.2 °F (36.8 °C) (!) 49 18 (!) 114/39 98 %   05/20/22 1824 98.1 °F (36.7 °C) (!) 49 18 (!) 113/39 100 %   05/20/22 1754 98 °F (36.7 °C) (!) 52 18 (!) 119/43 99 %   05/20/22 1643 98.3 °F (36.8 °C) (!) 53 15 (!) 137/50 98 %   05/20/22 1614 -- (!) 52 18 (!) 122/47 96 %   05/20/22 1559 -- (!) 52 20 (!) 123/45 98 %   05/20/22 1544 -- (!) 52 15 (!) 124/43 98 %   05/20/22 1534 -- (!) 53 21 (!) 123/45 98 %   05/20/22 1519 98.2 °F (36.8 °C) (!) 54 18 (!) 126/59 98 %   05/20/22 1502 98.3 °F (36.8 °C) (!) 54 12 (!) 124/51 98 %   05/20/22 1243 -- 61 18 (!) 133/57 98 %   05/20/22 1221 98 °F (36.7 °C) (!) 52 16 (!) 114/41 98 %       Pulse Oximetry Analysis - 98% on ra    Cardiac Monitor:   Rate: 52 bpm  Rhythm: sinus cortez     ED EKG interpretation:  Rhythm: sinus bradycardia and 1st degree block; and regular . Rate (approx.): 56; Axis: normal; P wave: normal; QRS interval: normal ; ST/T wave: normal; Other findings: normal. This EKG was interpreted by DANO Goff MD,ED Provider. Records Reviewed: Nursing Notes and Old Medical Records    Provider Notes (Medical Decision Making):   Patient presents with chief complaint of fatigue, generalized weakness, peripheral edema, and dark stool. She does take iron supplements. On presentation she is hemodynamically stable. Does have significant pitting edema in her lower extremities as well as pitting in her right upper extremity but has intact pulses. Concern for electrolyte imbalance, CHF, anemia, GI bleeding. Plan for basic labs, coags, CT head, urinalysis. ED Course:   Initial assessment performed. The patients presenting problems have been discussed, and they are in agreement with the care plan formulated and outlined with them. I have encouraged them to ask questions as they arise throughout their visit. Hemoglobin returned low at 5.9. Will give transfusion, plan for hospitalist admission. Procedure Note - Rectal Exam:   Performed by: Jb Arias MD  Chaperoned by: Aneta Fuchs RN  Rectal exam performed. black stool was collected.   Stool was collected and sent to the lab for Hemoccult testing. The procedure took 1-15 minutes, and pt tolerated well. I have discussed with the patient and daughter the rationale for blood component transfusion; its benefits in treating or preventing fatigue, organ damage, or death; and its risk which includes mild transfusion reactions, rare risk of blood borne infection, or more serious but rare reactions. I have discussed the alternatives to transfusion, including the risk and consequences of not receiving transfusion. The patient and daughter had an opportunity to ask questions and had agreed to proceed with transfusion of blood components. Procedures:  Procedures    Critical Care:  CRITICAL CARE NOTE :  IMPENDING DETERIORATION -Cardiovascular and Metabolic  ASSOCIATED RISK FACTORS - Hypotension and Bleeding  MANAGEMENT- Bedside Assessment  INTERPRETATION -  Xrays, ECG and Blood Pressure  INTERVENTIONS - hemodynamic mngmt  CASE REVIEW - Hospitalist/Intensivist  TREATMENT RESPONSE -Stable  PERFORMED BY - Self    NOTES   :    I have spent 35 minutes of critical care time involved in lab review, consultations with specialist, family decision- making, bedside attention and documentation. During this entire length of time I was immediately available to the patient . Ada Buitrago MD      Disposition:    Admission Note:  Patient is being admitted to the hospital by Dr. Carlos Ball, Service: Hospitalist.  The results of their tests and reasons for their admission have been discussed with them and available family. They convey agreement and understanding for the need to be admitted and for their admission diagnosis. Diagnosis     Clinical Impression:   1. Gastrointestinal hemorrhage, unspecified gastrointestinal hemorrhage type    2. Peripheral edema            Please note that this dictation was completed with [employer], the [employer] voice recognition software.   Quite often unanticipated grammatical, syntax, homophones, and other interpretive errors are inadvertently transcribed by the computer software. Please disregard these errors.   Please excuse any errors that have escaped final proofreading

## 2022-05-21 ENCOUNTER — APPOINTMENT (OUTPATIENT)
Dept: GENERAL RADIOLOGY | Age: 82
DRG: 378 | End: 2022-05-21
Attending: NURSE PRACTITIONER
Payer: MEDICARE

## 2022-05-21 LAB
ABO + RH BLD: NORMAL
ANION GAP SERPL CALC-SCNC: 2 MMOL/L (ref 5–15)
ATRIAL RATE: 56 BPM
BLD PROD TYP BPU: NORMAL
BLD PROD TYP BPU: NORMAL
BLOOD GROUP ANTIBODIES SERPL: NORMAL
BPU ID: NORMAL
BPU ID: NORMAL
BUN SERPL-MCNC: 41 MG/DL (ref 6–20)
BUN/CREAT SERPL: 27 (ref 12–20)
CALCIUM SERPL-MCNC: 7.8 MG/DL (ref 8.5–10.1)
CALCULATED P AXIS, ECG09: 52 DEGREES
CALCULATED R AXIS, ECG10: -9 DEGREES
CALCULATED T AXIS, ECG11: -37 DEGREES
CHLORIDE SERPL-SCNC: 106 MMOL/L (ref 97–108)
CO2 SERPL-SCNC: 32 MMOL/L (ref 21–32)
CREAT SERPL-MCNC: 1.54 MG/DL (ref 0.55–1.02)
CROSSMATCH RESULT,%XM: NORMAL
CROSSMATCH RESULT,%XM: NORMAL
DIAGNOSIS, 93000: NORMAL
ERYTHROCYTE [DISTWIDTH] IN BLOOD BY AUTOMATED COUNT: 19 % (ref 11.5–14.5)
GLUCOSE SERPL-MCNC: 96 MG/DL (ref 65–100)
HCT VFR BLD AUTO: 26.1 % (ref 35–47)
HCT VFR BLD AUTO: 30 % (ref 35–47)
HGB BLD-MCNC: 8.3 G/DL (ref 11.5–16)
HGB BLD-MCNC: 9.3 G/DL (ref 11.5–16)
MAGNESIUM SERPL-MCNC: 2.3 MG/DL (ref 1.6–2.4)
MCH RBC QN AUTO: 28.5 PG (ref 26–34)
MCHC RBC AUTO-ENTMCNC: 31.8 G/DL (ref 30–36.5)
MCV RBC AUTO: 89.7 FL (ref 80–99)
NRBC # BLD: 0 K/UL (ref 0–0.01)
NRBC BLD-RTO: 0 PER 100 WBC
P-R INTERVAL, ECG05: 222 MS
PLATELET # BLD AUTO: 113 K/UL (ref 150–400)
PMV BLD AUTO: 11.5 FL (ref 8.9–12.9)
POTASSIUM SERPL-SCNC: 3.5 MMOL/L (ref 3.5–5.1)
Q-T INTERVAL, ECG07: 390 MS
QRS DURATION, ECG06: 86 MS
QTC CALCULATION (BEZET), ECG08: 376 MS
RBC # BLD AUTO: 2.91 M/UL (ref 3.8–5.2)
SODIUM SERPL-SCNC: 140 MMOL/L (ref 136–145)
SPECIMEN EXP DATE BLD: NORMAL
STATUS OF UNIT,%ST: NORMAL
STATUS OF UNIT,%ST: NORMAL
UNIT DIVISION, %UDIV: 0
UNIT DIVISION, %UDIV: 0
VENTRICULAR RATE, ECG03: 56 BPM
WBC # BLD AUTO: 5 K/UL (ref 3.6–11)

## 2022-05-21 PROCEDURE — 80048 BASIC METABOLIC PNL TOTAL CA: CPT

## 2022-05-21 PROCEDURE — 85018 HEMOGLOBIN: CPT

## 2022-05-21 PROCEDURE — 97535 SELF CARE MNGMENT TRAINING: CPT

## 2022-05-21 PROCEDURE — 97530 THERAPEUTIC ACTIVITIES: CPT | Performed by: PHYSICAL THERAPIST

## 2022-05-21 PROCEDURE — 51798 US URINE CAPACITY MEASURE: CPT

## 2022-05-21 PROCEDURE — 71045 X-RAY EXAM CHEST 1 VIEW: CPT

## 2022-05-21 PROCEDURE — 74011250636 HC RX REV CODE- 250/636: Performed by: STUDENT IN AN ORGANIZED HEALTH CARE EDUCATION/TRAINING PROGRAM

## 2022-05-21 PROCEDURE — 85027 COMPLETE CBC AUTOMATED: CPT

## 2022-05-21 PROCEDURE — 97165 OT EVAL LOW COMPLEX 30 MIN: CPT

## 2022-05-21 PROCEDURE — 65270000029 HC RM PRIVATE

## 2022-05-21 PROCEDURE — C9113 INJ PANTOPRAZOLE SODIUM, VIA: HCPCS | Performed by: STUDENT IN AN ORGANIZED HEALTH CARE EDUCATION/TRAINING PROGRAM

## 2022-05-21 PROCEDURE — 83735 ASSAY OF MAGNESIUM: CPT

## 2022-05-21 PROCEDURE — 74011000250 HC RX REV CODE- 250: Performed by: STUDENT IN AN ORGANIZED HEALTH CARE EDUCATION/TRAINING PROGRAM

## 2022-05-21 PROCEDURE — 36415 COLL VENOUS BLD VENIPUNCTURE: CPT

## 2022-05-21 PROCEDURE — 97530 THERAPEUTIC ACTIVITIES: CPT

## 2022-05-21 PROCEDURE — A4565 SLINGS: HCPCS

## 2022-05-21 PROCEDURE — 97161 PT EVAL LOW COMPLEX 20 MIN: CPT | Performed by: PHYSICAL THERAPIST

## 2022-05-21 PROCEDURE — 74011250637 HC RX REV CODE- 250/637: Performed by: STUDENT IN AN ORGANIZED HEALTH CARE EDUCATION/TRAINING PROGRAM

## 2022-05-21 RX ADMIN — TRAZODONE HYDROCHLORIDE 25 MG: 50 TABLET ORAL at 21:21

## 2022-05-21 RX ADMIN — SODIUM CHLORIDE, PRESERVATIVE FREE 10 ML: 5 INJECTION INTRAVENOUS at 14:41

## 2022-05-21 RX ADMIN — SODIUM CHLORIDE 40 MG: 9 INJECTION INTRAMUSCULAR; INTRAVENOUS; SUBCUTANEOUS at 09:39

## 2022-05-21 RX ADMIN — SODIUM CHLORIDE 40 MG: 9 INJECTION INTRAMUSCULAR; INTRAVENOUS; SUBCUTANEOUS at 21:21

## 2022-05-21 RX ADMIN — CITALOPRAM HYDROBROMIDE 20 MG: 20 TABLET ORAL at 17:14

## 2022-05-21 RX ADMIN — SODIUM CHLORIDE, PRESERVATIVE FREE 10 ML: 5 INJECTION INTRAVENOUS at 06:00

## 2022-05-21 RX ADMIN — PENTOXIFYLLINE 400 MG: 400 TABLET, EXTENDED RELEASE ORAL at 10:05

## 2022-05-21 RX ADMIN — ATORVASTATIN CALCIUM 10 MG: 10 TABLET, FILM COATED ORAL at 21:21

## 2022-05-21 RX ADMIN — FOLIC ACID 1 MG: 1 TABLET ORAL at 10:05

## 2022-05-21 NOTE — PROGRESS NOTES
End of Shift Note    Bedside shift change report given to Cale Carl (oncoming nurse) by Jennifer Pisano RN (offgoing nurse). Report included the following information SBAR, Kardex, Intake/Output, MAR and Recent Results    Shift worked:  7p-7a     Shift summary and any significant changes:     Patient rested this shift; patient had no complaints of pain. Blood transfusion completed. Patient did not void this shift; day shift aware. Concerns for physician to address:  Lack of void this shift     Zone phone for oncoming shift:   [phone]       Activity:  Activity Level: Up with Assistance  Number times ambulated in hallways past shift: 0  Number of times OOB to chair past shift: 0    Cardiac:   Cardiac Monitoring: Yes      Cardiac Rhythm: Sinus Javy    Access:   Current line(s): PIV     Genitourinary:   Urinary status: due to void    Respiratory:   O2 Device: None (Room air)       GI:  Last Bowel Movement Date: 05/19/22  Current diet:  ADULT DIET Clear Liquid; 4 carb choices (60 gm/meal); Low Sodium (2 gm)  Tolerating current diet: YES       Pain Management:   Patient states pain is manageable on current regimen: YES    Skin:  Zachary Score: 16  Interventions: increase time out of bed    Patient Safety:  Fall Score:  Total Score: 4  Interventions: bed/chair alarm, assistive device (walker, cane, etc) and pt to call before getting OOB  High Fall Risk: Yes    Length of Stay:  Expected LOS: - - -  Actual LOS: 1      Jennifer Pisano RN

## 2022-05-21 NOTE — PROGRESS NOTES
End of Shift Note    Bedside shift change report given to ISAAK Kirkpatrick (oncoming nurse) by Reyna Meeks LPN (offgoing nurse). Report included the following information SBAR, Kardex and MAR    Shift worked:  7a-7p     Shift summary and any significant changes:    Placed new sling on patients arm and also gave an ice pack for swelling of hand-swelling decreased. Encouraged patient to drink more fluids so she can urinate. Concerns for physician to address: none   Zone phone for oncoming shift:  [phone]     Activity:  Activity Level: Up with Assistance  Number times ambulated in hallways past shift: 0  Number of times OOB to chair past shift: 2    Cardiac:   Cardiac Monitoring: Yes      Cardiac Rhythm: Sinus Javy    Access:   Current line(s): PIV     Genitourinary:   Urinary status: voiding    Respiratory:   O2 Device: None (Room air)  Chronic home O2 use?: NO  Incentive spirometer at bedside: NO       GI:  Last Bowel Movement Date: 05/19/22  Current diet:  ADULT DIET Clear Liquid; 4 carb choices (60 gm/meal); Low Sodium (2 gm)  DIET NPO  Passing flatus: YES  Tolerating current diet: YES       Pain Management:   Patient states pain is manageable on current regimen: YES    Skin:  Zachary Score: 19  Interventions: increase time out of bed, PT/OT consult and nutritional support     Patient Safety:  Fall Score:  Total Score: 4  Interventions: gripper socks, pt to call before getting OOB and stay with me (per policy)  High Fall Risk: Yes    Length of Stay:  Expected LOS: - - -  Actual LOS: 1      Reyna Meeks LPN

## 2022-05-21 NOTE — PROGRESS NOTES
Problem: Self Care Deficits Care Plan (Adult)  Goal: *Acute Goals and Plan of Care (Insert Text)  Description: FUNCTIONAL STATUS PRIOR TO ADMISSION: Patient was modified independent using a rolling walker for functional mobility. HOME SUPPORT: The patient lived with daughter and required maximal assistance for ADLs since she had her fall and broke her  right humerus. Occupational Therapy Goals  Initiated 5/21/2022  1. Patient will perform grooming at the sink with supervision/set-up within 7 day(s). 2.  Patient will perform bathing at the sink with moderate assistance  within 7 day(s). 3.  Patient will perform lower body dressing with moderate assistance  within 7 day(s). 4.  Patient will perform toilet transfers with supervision/set-up within 7 day(s). 5.  Patient will perform all aspects of toileting with independence within 7 day(s). 6. Patient to be able to adjust sling in order for her hand to be above her heart to decrease swelling in right hand, with in 7 days. Outcome: Not Met   OCCUPATIONAL THERAPY EVALUATION  Patient: Kel Roy (29 y.o. female)  Date: 5/21/2022  Primary Diagnosis: Acute GI bleeding [K92.2]  Symptomatic anemia [D64.9]  Procedure(s) (LRB):  ESOPHAGOGASTRODUODENOSCOPY (EGD) (N/A)     Precautions: fall  NWB (right UE)    ASSESSMENT  Based on the objective data described below, the patient presents with decreased endurance, strength, functional mobility, fx right humerus fear of falling, decreased ADLs. Pt was living at home with family and stated that she has needed assist with ADLs since her fall and fracturing right  humerus. She was supine in bed and sling was not on properly and her right hand was very swollen. Pt was fearful of falling and did not want to get out of bed at first but with encouragement she was cooperative. OT assisted pt with doffing her pull over shirt and issued her a new sling that would support her hand better.   Pt was mod of 2 for supine to sit and min of 2 to stand and transfer to [facility]. She was able to clean self in standing with CGA to min of 1. She was min of 2 for transfer to chair and was HHA for her functional transfer. Pt was left sitting up in chair and all VSS and sling on, right arm propped on pillow and asked nursing to please put ice on her upper arm due to increased swelling. Feel that pt would be able to return home if she has 24/7 assist at home if she does not she will need rehab at discharge. Current Level of Function Impacting Discharge (ADLs/self-care): max for ADLs     Functional Outcome Measure: The patient scored 30/100 on the Barthel outcome measure which is indicative of max for ADLs. Other factors to consider for discharge: pt reports that her daughter works and she is at home alone during the day and she will need 24/7 assist at discharge      Patient will benefit from skilled therapy intervention to address the above noted impairments. PLAN :  Recommendations and Planned Interventions: self care training, functional mobility training, therapeutic exercise, balance training, therapeutic activities, endurance activities, patient education, and home safety training    Frequency/Duration: Patient will be followed by occupational therapy 4 times a week to address goals. Recommendation for discharge: (in order for the patient to meet his/her long term goals)  To be determined: pending progress and assist pt will have at home,   rehab VS home with 24/7 assist.     This discharge recommendation:  Has been made in collaboration with the attending provider and/or case management    IF patient discharges home will need the following DME: tbd       SUBJECTIVE:   Patient stated I dont want to get up. I dont want to fall .     OBJECTIVE DATA SUMMARY:   HISTORY:   Past Medical History:   Diagnosis Date    Chronic pain     hip, back    Depression     Di[address] ([facility] [address]     GERD (gastroesophageal reflux disease) Hemorrhagic gastritis     10/2013    [place] (hard of hearing)     no hearing aides    Hyperlipemia     Hypertension     Stroke (cerebrum) ([facility] [address]     no residual    Stroke ([facility] [address]     No residual says patient     Past Surgical History:   Procedure Laterality Date    HX  SECTION  1979    HX CHOLECYSTECTOMY      IR KYPHOPLASTY LUMBAR  10/19/2020    IR KYPHOPLASTY LUMBAR  10/19/2020    SD COLONOSCOPY FLX DX W/COLLJ SPEC WHEN PFRMD  2013         SD ESOPHAGOGASTRODUODENOSCOPY TRANSORAL DIAGNOSTIC  10/10/2013            Expanded or extensive additional review of patient history:     Home Situation  Home Environment: Trailer/mobile home  # Steps to Enter: 4  Rails to Enter: Yes  Hand Rails : Left  Wheelchair Ramp: No (supposed to get ramp soon)  Living Alone: No  Support Systems: Other Family Member(s),Child(diane) (alone during the daydue to daughter/son-in-law working)  Patient Expects to be Discharged to[de-identified] Home with home health  Current DME Used/Available at Home: Brace/Splint,Cane, quad,Commode, bedside,Walker, rolling  Tub or Shower Type:  (sponge bathes)    Hand dominance: Right    EXAMINATION OF PERFORMANCE DEFICITS:  Cognitive/Behavioral Status:  Neurologic State: Alert  Orientation Level: Oriented X4  Cognition: Follows commands  Perception: Appears intact  Perseveration: No perseveration noted  Safety/Judgement: Fall prevention    Skin: in fair health     Edema: swelling noted in right UE    Hearing:   Auditory  Auditory Impairment: None    Vision/Perceptual:                 Glasses and intact                     Range of Motion:    AROM: Generally decreased, functional (except right shoulder/elbow not tested due to immobilization)                         Strength:    Strength: Generally decreased, functional                Coordination:  Coordination: Generally decreased, functional            Tone & Sensation:    Tone: Normal  Sensation: Intact                      Balance:  Sitting: Intact  Standing: Impaired; Without support  Standing - Static: Constant support;Good  Standing - Dynamic : Constant support; Fair    Functional Mobility and Transfers for ADLs:  Bed Mobility:  Supine to Sit: Moderate assistance;Minimum assistance;Assist x2; Additional time  Scooting: Contact guard assistance;Assist x2 (from sitting at edge of bed)    Transfers:  Sit to Stand: Minimum assistance;Assist x2  Stand to Sit: Minimum assistance;Assist x2  Bed to Chair: Minimum assistance;Assist x2    ADL Assessment:  Feeding: Setup    Oral Facial Hygiene/Grooming: Setup    Bathing: Maximum assistance    Upper Body Dressing: Maximum assistance    Lower Body Dressing: Maximum assistance    Toileting: Maximum assistance;Minimum assistance                Cognitive Retraining  Safety/Judgement: Fall prevention           Functional Measure:    Barthel Index:  Bathin  Bladder: 5  Bowels: 10  Groomin  Dressin  Feedin  Mobility: 0  Stairs: 0  Toilet Use: 5  Transfer (Bed to Chair and Back): 5  Total: 30/100      The Barthel ADL Index: Guidelines  1. The index should be used as a record of what a patient does, not as a record of what a patient could do. 2. The main aim is to establish degree of independence from any help, physical or verbal, however minor and for whatever reason. 3. The need for supervision renders the patient not independent. 4. A patient's performance should be established using the best available evidence. Asking the patient, friends/relatives and nurses are the usual sources, but direct observation and common sense are also important. However direct testing is not needed. 5. Usually the patient's performance over the preceding 24-48 hours is important, but occasionally longer periods will be relevant. 6. Middle categories imply that the patient supplies over 50 per cent of the effort. 7. Use of aids to be independent is allowed.     Score Interpretation (from [address] [address])    Independent 60-79 Minimally independent   40-59 Partially dependent   20-39 Very dependent   <20 Totally dependent     -Carlos Freitas, Barthel, D.W. ([id number]). Functional evaluation: the Barthel Index. [address] ([address] (1997). The Barthel activities of daily living index: self-reporting versus actual performance in the old (> or = 75 years). Journal of [address] [place] [address] 45(7), 14 [facility], PATRICK, Lay Baker., Wendy Gil. (1999). Measuring the change in disability after inpatient rehabilitation; comparison of the responsiveness of the Barthel Index and Functional [place] Measure. Journal of Neurology, Neurosurgery, and Psychiatry, 66(4), 863-858. BOBO Villalobos, ESTELLE Cool, & Harpreet Srinivasan MALYSSIA. (2004) Assessment of post-stroke quality of life in cost-effectiveness studies: The usefulness of the Barthel Index and the EuroQoL-5D. Quality of Life Research, 15, 211-31         Occupational Therapy Evaluation Charge Determination   History Examination Decision-Making   MEDIUM Complexity : Expanded review of history including physical, cognitive and psychosocial  history  MEDIUM Complexity : 3-5 performance deficits relating to physical, cognitive , or psychosocial skils that result in activity limitations and / or participation restrictions LOW Complexity : No comorbidities that affect functional and no verbal or physical assistance needed to complete eval tasks       Based on the above components, the patient evaluation is determined to be of the following complexity level: LOW   Pain Rating:  Pt stated she had pain in right UE but did not rate, stated it just hurt    Activity Tolerance:   Fair    After treatment patient left in no apparent distress:    Sitting in chair, Call bell within reach, and Bed / chair alarm activated    COMMUNICATION/EDUCATION:   The patients plan of care was discussed with: Physical therapist and Registered nurse. Patient/family agree to work toward stated goals and plan of care. This patients plan of care is appropriate for delegation to VALERIY.     Thank you for this referral.  Federico Nolasco, OT  Time Calculation: 36 mins

## 2022-05-21 NOTE — PROGRESS NOTES
Problem: Mobility Impaired (Adult and Pediatric)  Goal: *Acute Goals and Plan of Care (Insert Text)  Description: FUNCTIONAL STATUS PRIOR TO ADMISSION: Patient fell 5/11/2022 and fractured her right humerus, she has had limited mobility since and has required assist from daughter for ADL/mobility (although daughter works during the day out of the home)    [facility]: The patient lived with daughter/son-in-law and 2 granddaughters and required moderate assistance  for ADL, minimal assist for mobility since her fall. Patient states that ramp will be built for home entry in the near future. Physical Therapy Goals  Initiated 5/21/2022  1. Patient will move from supine to sit and sit to supine  in bed with minimal assistance/contact guard assist within 7 day(s). 2.  Patient will transfer from bed to chair and chair to bed with minimal assistance/contact guard assist using the least restrictive device within 7 day(s). 3.  Patient will perform sit to stand with minimal assistance/contact guard assist within 7 day(s). 4.  Patient will ambulate with minimal assistance/contact guard assist for 50 feet with the least restrictive device within 7 day(s). 5.  Patient will ascend/descend 4 stairs with left handrail(s) with minimal assistance/contact guard assist within 7 day(s). Outcome: Not Met   PHYSICAL THERAPY EVALUATION  Patient: Malini Fried (49 y.o. female)  Date: 5/21/2022  Primary Diagnosis: Acute GI bleeding [K92.2]  Symptomatic anemia [D64.9]  Procedure(s) (LRB):  ESOPHAGOGASTRODUODENOSCOPY (EGD) (N/A)     Precautions: Fall, NWB (right UE)    ASSESSMENT  Based on the objective data described below, the patient presents with right UE immobilized in sling due to humerus fracture, general decrease in strength/activity tolerance, decreased standing balance/tolerance, and decreased functional mobility, including bed mobility, transfers, and gait.  She is initially very anxious with mobility, but becomes more calm with practice/with increasing trust in Rehab staff. She requires moderates assist x 1 for upper body and minimal assist x 1 for lower body to come to sit at edge of bed. Static sitting balance is good once established. Patient comes to stand with minimal assist x 2 and transfers to [facility] to left with minimal assist x 2. She is able to perform gentle dynamic standing activity with contact guard/minimal assist x 1 for up to 1 minute/no UE support. Patient ambulates 3 feet to recliner with left HHA/minimal assist x 2 and is left sitting with feet supported, call bell in reach, and all needs met. Nursing aware. Depending on progress, patient may require SNF Rehab before returning home with family's support or she may be able to return home with 24/7 assist and HHPT. Will follow to maximize strength, activity tolerance, and functional independence. Current Level of Function Impacting Discharge (mobility/balance): moderate x 1/minimal x 1 supine to sit, minimal assist x 2 with transfers and gait 3 feet. She has fair dynamic standing with contact guard/minimal assist x 1 for up to 1 minute. Functional Outcome Measure: The patient scored 30/100 on the Barthel outcome measure which is indicative of maximal dependence with ADL/functional mobility. Other factors to consider for discharge: NWB right UE, [place], daughter works during the day (unsure if other family members are available to assist), fall risk     Patient will benefit from skilled therapy intervention to address the above noted impairments. PLAN :  Recommendations and Planned Interventions: bed mobility training, transfer training, gait training, therapeutic exercises, neuromuscular re-education, edema management/control, patient and family training/education, and therapeutic activities      Frequency/Duration: Patient will be followed by physical therapy:  4 times a week to address goals.     Recommendation for discharge: (in order for the patient to meet his/her long term goals)  To be determined: SNF Rehab vs  assist and HHPT at least 2x/week    This discharge recommendation:  Has not yet been discussed the attending provider and/or case management    IF patient discharges home will need the following DME: patient owns DME required for discharge         SUBJECTIVE:   Patient stated I was one of 13 children.     OBJECTIVE DATA SUMMARY:   HISTORY:    Past Medical History:   Diagnosis Date    Chronic pain     hip, back    Depression     Diabetes ([address]     GERD (gastroesophageal reflux disease)     Hemorrhagic gastritis     10/2013    [place] (hard of hearing)     no hearing aides    Hyperlipemia     Hypertension     Stroke (cerebrum) ([address]     no residual    Stroke ([address]     No residual says patient     Past Surgical History:   Procedure Laterality Date    HX  SECTION      HX CHOLECYSTECTOMY      IR KYPHOPLASTY LUMBAR  10/19/2020    IR KYPHOPLASTY LUMBAR  10/19/2020    CA COLONOSCOPY FLX DX W/COLLJ SPEC WHEN PFRMD  2013         CA ESOPHAGOGASTRODUODENOSCOPY TRANSORAL DIAGNOSTIC  10/10/2013            Personal factors and/or comorbidities impacting plan of care: fractured right humerus, history of fall    Home Situation  Home Environment: Trailer/mobile home  # Steps to Enter: 4  Rails to Enter: Yes  Hand Rails : Left  Wheelchair Ramp: No (supposed to get ramp soon)  Living Alone: No  Support Systems: Other Family Member(s),Child(diane) (alone during the daydue to daughter/son-in-law working)  Patient Expects to be Discharged to[de-identified] Home with home health  Current DME Used/Available at Home: Brace/Splint,Cane, quad,Commode, bedside,Walker, rolling  Tub or Shower Type:  (sponge bathes)    EXAMINATION/PRESENTATION/DECISION MAKING:   Critical Behavior:  Neurologic State: Alert  Orientation Level: Oriented X4  Cognition: Follows commands  Safety/Judgement: Fall prevention  Hearing:   Auditory  Auditory Impairment: None  Skin:  bruising right torso, + glasses  Edema: dorsal right hand  Range Of Motion:  AROM: Generally decreased, functional (except right shoulder/elbow not tested due to immobilization)                       Strength:    Strength: Generally decreased, functional                    Tone & Sensation:   Tone: Normal              Sensation: Intact               Coordination:  Coordination: Generally decreased, functional  Vision:    + glasses  Functional Mobility:  Bed Mobility:     Supine to Sit: Moderate assistance;Minimum assistance;Assist x2; Additional time     Scooting: Contact guard assistance;Assist x2 (from sitting at edge of bed)  Transfers:  Sit to Stand: Minimum assistance;Assist x2  Stand to Sit: Minimum assistance;Assist x2        Bed to Chair: Minimum assistance;Assist x2              Balance:   Sitting: Intact  Standing: Impaired; Without support  Standing - Static: Constant support;Good  Standing - Dynamic : Constant support; Fair  Ambulation/Gait Training:  Distance (ft): 3 Feet (ft)  Assistive Device: Gait belt (left hand-held assist)  Ambulation - Level of Assistance: Minimal assistance;Assist x2     Gait Description (WDL): Exceptions to WDL  Gait Abnormalities: Decreased step clearance  Right Side Weight Bearing: Full  Left Side Weight Bearing: Full  Base of Support: Widened     Speed/Annalise: Pace decreased (<100 feet/min)  Step Length: Left shortened;Right shortened      Therapeutic Exercises:   Gentle AROM in available ranges left UE/both LEs prior to mobility    Functional Measure:  Barthel Index:    Bathin  Bladder: 5  Bowels: 10  Groomin  Dressin  Feedin  Mobility: 0  Stairs: 0  Toilet Use: 5  Transfer (Bed to Chair and Back): 5  Total: 30/100       The Barthel ADL Index: Guidelines  1. The index should be used as a record of what a patient does, not as a record of what a patient could do.   2. The main aim is to establish degree of independence from any help, physical or verbal, however minor and for whatever reason. 3. The need for supervision renders the patient not independent. 4. A patient's performance should be established using the best available evidence. Asking the patient, friends/relatives and nurses are the usual sources, but direct observation and common sense are also important. However direct testing is not needed. 5. Usually the patient's performance over the preceding 24-48 hours is important, but occasionally longer periods will be relevant. 6. Middle categories imply that the patient supplies over 50 per cent of the effort. 7. Use of aids to be independent is allowed. Score Interpretation (from [address] [facility] [address])    Independent   60-79 Minimally independent   40-59 Partially dependent   20-39 Very dependent   <20 Totally dependent     -Juanjo Freitas., Barthel, D.W. (1965). Functional evaluation: the Barthel Index. [address] [facility] ([address] [facility] [facility] [address] (1997). The Barthel activities of daily living index: self-reporting versus actual performance in the old (> or = 75 years). Journal [address] 45(7), 14 [facility], LizaADRIAN, Ravin Dear., [facility]. (1999). Measuring the change in disability after inpatient rehabilitation; comparison of the responsiveness of the Barthel Index and Functional [place] Measure. Journal of Neurology, Neurosurgery, and Psychiatry, 66(4), 850-626. Newton Guillaume NDARINEL.NESHA, ESTELLE Cool, & Cheryl Greer, M.A. (2004) Assessment of post-stroke quality of life in cost-effectiveness studies: The usefulness of the Barthel Index and the EuroQoL-5D.  Quality of Life Research, 15, 339-75           Physical Therapy Evaluation Charge Determination   History Examination Presentation Decision-Making   MEDIUM  Complexity : 1-2 comorbidities / personal factors will impact the outcome/ POC  LOW Complexity : 1-2 Standardized tests and measures addressing body structure, function, activity limitation and / or participation in recreation  LOW Complexity : Stable, uncomplicated  LOW Complexity : FOTO score of       Based on the above components, the patient evaluation is determined to be of the following complexity level: LOW     Pain Rating:  Patient does not rate right UE pain but she is painful with supine to sit transfer    Activity Tolerance:   Poor, SpO2 stable on RA, and requires rest breaks    After treatment patient left in no apparent distress:   Sitting in chair, Call bell within reach, Bed / chair alarm activated, and Caregiver / family present    COMMUNICATION/EDUCATION:   The patients plan of care was discussed with: Occupational therapist and Registered nurse. Fall prevention education was provided and the patient/caregiver indicated understanding., Patient/family have participated as able in goal setting and plan of care. , and Patient/family agree to work toward stated goals and plan of care.     Thank you for this referral.  Justin Fontana, PT   Time Calculation: 29 mins

## 2022-05-21 NOTE — PROGRESS NOTES
Hospitalist Progress Note    NAME: Gianna Burnham   :  1940   MRN:  [id number]     HPI excerpted from admission H&P:  Lina John is a 78 y.o.  female who presents with abnormal labs with low hemoglobin along with nausea and vomiting. Patient past medical history of iron deficiency anemia, hypertension, anxiety disorder, insomnia, hyperlipidemia. Patient had a fall few days ago and got a right humerus fracture. Patient had a follow-up appointment with PCP yesterday and got a lab work done. Last night patient got a call from the primary care doctor that hemoglobin is low and she needs to come to the hospital for evaluation. Currently the patient is doing fine had no chest pain or shortness of breath has on and off nausea with vomiting-by looking according to the daughter. No fever or chills, no headache, no dizziness, no passing out episodes. Patient is swelling the lower legs and has been on stockings. \"    Assessment / Plan:  Acute GIB  GERD  Acute blood loss anemia on chronic anemia (iron deficiency)  Thrombocytopenia   N/V  CT abdomen/pelvis 22:  1. No acute intra-abdominal pathology. No evidence of bowel obstruction. 2.   Shrunken liver with questionable nodular contour. Question history of cirrhosis. Abdominal pelvic ascites is noted with splenomegaly. 3.  Cholecystectomy. Common bile duct is mildly dilated which may be physiologic  given the patient's age. Recommend correlation with serum alkaline phosphatase. 4.  Nonobstructing right renal stone.  - GI input appreciated. - Patient received 1 unit PRBCs in ED.    - Transfuse for Hgb <7.0  - Continue pantoprazole 40 mg IV q12h. - Plan for EGD on Monday. - Monitor blood count q12h. Dominic LE edema  Hypoalbuminemia   - Patient had echo completed in 2021, family to provide report. - Hypoalbuminemia likely contributing to edema.  - Patient received dose of IV furosemide after blood.      - Since edema mana without unilateral difference will hold on venous dopplers.     - Obtain CXR. JAE  - Avoid nephrotoxins.   - Adjust meds based on CrCl. - If CXR does not reveal pulmonary edema, give dose of albumin - hold on crystalloid 2/2 significant edema.    - Monitor. Recent right humerus fraction   CT head 05/20/22: Moderate chronic small vessel ischemic disease. No acute intracranial abnormality.  - RUE edematous. - Maintain sling.  - Elevate as able. - OP follow up with ortho on 06/03/22, per ortho patient not good candidate for surgical intervention. HTN  Dyslipidemia   PAD  - Continue carvedilol 3.125 mg po bid. - Continue atorvastatin 10 mg po daily.   - Hold pentoxifylline 2/2 GIB. Depression   Anxiety disorder  - Continue citalopram 20 mg po daily at hs. Insomnia   - Continue trazodone 25 mg po daily at hs.         25.0 - 29.9 Overweight / Body mass index is 28.9 kg/m². Estimated discharge date: May 24  Barriers:    Code status: Full  Prophylaxis: SCDs  Recommended Disposition: TBD     Subjective:     Chief Complaint / Reason for Physician Visit  General:  A/A/O.  NAD. HEENT:  Normocephalic. Sclera anicteric. Mucous membranes moist.    Chest:  Resps even/unlabored with symmetrical CWE. Air entry full. Lungs CTA. No use of accessory muscles. CV:  RRR. Normal S1/S2. No M/C/R appreciated. No JVD. RUE (fracture side) with 4 mm pitting edema. 2 mm pitting pretibial edema noted mana. Cap refill < 3 sec. Peripheral pulses 1+. GI:  Abdomen soft/NT/ND. ABT X 4.    :  Voiding. Neurologic:  [place]. Face symmetrical.  Speech normal.     Psych:  Cooperative. No anxiety or agitation. No suicidal/homicidal ideation. Skin:  Warm and color appropriate. No rashes or jaundice. Turgor elastic.      Review of Systems:  Symptom Y/N Comments  Symptom Y/N Comments   Fever/Chills N   Chest Pain N    Poor Appetite N   Edema Y    Cough N   Abdominal Pain N    Sputum N   Joint Pain N    SOB/DAO N   Pruritis/Rash N    Nausea/vomit N None currently  Tolerating PT/OT     Diarrhea N   Tolerating Diet Y    Constipation N   Other       Could NOT obtain due to:      Objective:     VITALS:   Last 24hrs VS reviewed since prior progress note. Most recent are:  Patient Vitals for the past 24 hrs:   Temp Pulse Resp BP SpO2   05/21/22 0800 -- (!) 55 -- -- --   05/21/22 0714 98.5 °F (36.9 °C) (!) 55 18 (!) 119/46 96 %   05/21/22 0326 97.1 °F (36.2 °C) (!) 50 18 (!) 128/47 96 %   05/21/22 0017 97.6 °F (36.4 °C) (!) 54 18 (!) 127/38 96 %   05/20/22 2148 97 °F (36.1 °C) (!) 52 18 (!) 152/44 99 %   05/20/22 1838 98.2 °F (36.8 °C) (!) 49 18 (!) 114/39 98 %   05/20/22 1824 98.1 °F (36.7 °C) (!) 49 18 (!) 113/39 100 %   05/20/22 1754 98 °F (36.7 °C) (!) 52 18 (!) 119/43 99 %   05/20/22 1643 98.3 °F (36.8 °C) (!) 53 15 (!) 137/50 98 %   05/20/22 1614 -- (!) 52 18 (!) 122/47 96 %   05/20/22 1559 -- (!) 52 20 (!) 123/45 98 %   05/20/22 1544 -- (!) 52 15 (!) 124/43 98 %   05/20/22 1534 -- (!) 53 21 (!) 123/45 98 %   05/20/22 1519 98.2 °F (36.8 °C) (!) 54 18 (!) 126/59 98 %   05/20/22 1502 98.3 °F (36.8 °C) (!) 54 12 (!) 124/51 98 %   05/20/22 1243 -- 61 18 (!) 133/57 98 %   05/20/22 1221 98 °F (36.7 °C) (!) 52 16 (!) 114/41 98 %       Intake/Output Summary (Last 24 hours) at 5/21/2022 1019  Last data filed at 5/21/2022 2879  Gross per 24 hour   Intake 832.9 ml   Output 0 ml   Net 832.9 ml        I had a face to face encounter and independently examined this patient on 5/21/2022, as outlined below:  PHYSICAL EXAM:  General:  A/A/O X 3. NAD. HEENT:  Normocephalic. Sclera anicteric. EOMI. Mucous membranes moist.    Chest:  Resps even/unlabored with symmetrical CWE. Air entry full. Lungs CTA. No use of accessory muscles. CV:  RRR. Normal S1/S2. No M/C/R appreciated. No JVD. No peripheral edema. Cap refill < 3 sec. Peripheral pulses 2+. GI:  Abdomen soft/NT/ND. No organomegaly. No hernia. ABT X 4.    :  Voiding. Neurologic:  [place].  CN II-XII grossly intact. Face symmetrical.  Speech normal.     Psych:  Cooperative. No anxiety or agitation. No suicidal/homicidal ideation. Skin:  Warm and color appropriate. No rashes or jaundice. Turgor elastic. Reviewed most current lab test results and cultures  YES  Reviewed most current radiology test results   YES  Review and summation of old records today    NO  Reviewed patient's current orders and MAR    YES  PMH/SH reviewed - no change compared to H&P  ________________________________________________________________________  Care Plan discussed with:    Comments   Patient [address]     Consultant                        Multidiciplinary team rounds were held today with , nursing, pharmacist and clinical coordinator. Patient's plan of care was discussed; medications were reviewed and discharge planning was addressed. ________________________________________________________________________  Geetha Akbar NP     Procedures: see electronic medical records for all procedures/Xrays and details which were not copied into this note but were reviewed prior to creation of Plan. LABS:  I reviewed today's most current labs and imaging studies.   Pertinent labs include:  Recent Labs     05/21/22  0538 05/20/22  1307   WBC 5.0 4.4   HGB 8.3* 5.9*   HCT 26.1* 20.5*   * 139*     Recent Labs     05/21/22  0538 05/20/22  1307    139   K 3.5 3.7    103   CO2 32 34*   GLU 96 157*   BUN 41* 42*   CREA 1.54* 1.61*   CA 7.8* 8.1*   MG 2.3  --    ALB  --  1.9*   TBILI  --  1.0   ALT  --  17   INR  --  1.3*       Signed: Geetha Akbar NP

## 2022-05-22 LAB
BASOPHILS # BLD: 0 K/UL (ref 0–0.1)
BASOPHILS NFR BLD: 0 % (ref 0–1)
DIFFERENTIAL METHOD BLD: ABNORMAL
EOSINOPHIL # BLD: 0.2 K/UL (ref 0–0.4)
EOSINOPHIL NFR BLD: 3 % (ref 0–7)
ERYTHROCYTE [DISTWIDTH] IN BLOOD BY AUTOMATED COUNT: 19.2 % (ref 11.5–14.5)
HCT VFR BLD AUTO: 27.3 % (ref 35–47)
HGB BLD-MCNC: 8.2 G/DL (ref 11.5–16)
IMM GRANULOCYTES # BLD AUTO: 0 K/UL (ref 0–0.04)
IMM GRANULOCYTES NFR BLD AUTO: 0 % (ref 0–0.5)
LYMPHOCYTES # BLD: 1.3 K/UL (ref 0.8–3.5)
LYMPHOCYTES NFR BLD: 30 % (ref 12–49)
MCH RBC QN AUTO: 28.2 PG (ref 26–34)
MCHC RBC AUTO-ENTMCNC: 30 G/DL (ref 30–36.5)
MCV RBC AUTO: 93.8 FL (ref 80–99)
MONOCYTES # BLD: 0.6 K/UL (ref 0–1)
MONOCYTES NFR BLD: 14 % (ref 5–13)
NEUTS SEG # BLD: 2.3 K/UL (ref 1.8–8)
NEUTS SEG NFR BLD: 52 % (ref 32–75)
NRBC # BLD: 0 K/UL (ref 0–0.01)
NRBC BLD-RTO: 0 PER 100 WBC
PLATELET # BLD AUTO: 106 K/UL (ref 150–400)
PMV BLD AUTO: 11.7 FL (ref 8.9–12.9)
RBC # BLD AUTO: 2.91 M/UL (ref 3.8–5.2)
WBC # BLD AUTO: 4.5 K/UL (ref 3.6–11)

## 2022-05-22 PROCEDURE — P9047 ALBUMIN (HUMAN), 25%, 50ML: HCPCS | Performed by: NURSE PRACTITIONER

## 2022-05-22 PROCEDURE — 74011250636 HC RX REV CODE- 250/636: Performed by: NURSE PRACTITIONER

## 2022-05-22 PROCEDURE — 85025 COMPLETE CBC W/AUTO DIFF WBC: CPT

## 2022-05-22 PROCEDURE — 65270000029 HC RM PRIVATE

## 2022-05-22 PROCEDURE — 74011250637 HC RX REV CODE- 250/637: Performed by: STUDENT IN AN ORGANIZED HEALTH CARE EDUCATION/TRAINING PROGRAM

## 2022-05-22 PROCEDURE — 36415 COLL VENOUS BLD VENIPUNCTURE: CPT

## 2022-05-22 PROCEDURE — 74011250636 HC RX REV CODE- 250/636: Performed by: STUDENT IN AN ORGANIZED HEALTH CARE EDUCATION/TRAINING PROGRAM

## 2022-05-22 PROCEDURE — 74011000250 HC RX REV CODE- 250: Performed by: STUDENT IN AN ORGANIZED HEALTH CARE EDUCATION/TRAINING PROGRAM

## 2022-05-22 PROCEDURE — C9113 INJ PANTOPRAZOLE SODIUM, VIA: HCPCS | Performed by: STUDENT IN AN ORGANIZED HEALTH CARE EDUCATION/TRAINING PROGRAM

## 2022-05-22 RX ORDER — ALBUMIN HUMAN 250 G/1000ML
12.5 SOLUTION INTRAVENOUS EVERY 6 HOURS
Status: DISPENSED | OUTPATIENT
Start: 2022-05-22 | End: 2022-05-23

## 2022-05-22 RX ORDER — POTASSIUM CHLORIDE 7.45 MG/ML
10 INJECTION INTRAVENOUS
Status: COMPLETED | OUTPATIENT
Start: 2022-05-22 | End: 2022-05-22

## 2022-05-22 RX ADMIN — ATORVASTATIN CALCIUM 10 MG: 10 TABLET, FILM COATED ORAL at 22:46

## 2022-05-22 RX ADMIN — SODIUM CHLORIDE, PRESERVATIVE FREE 10 ML: 5 INJECTION INTRAVENOUS at 14:49

## 2022-05-22 RX ADMIN — POTASSIUM CHLORIDE 10 MEQ: 10 INJECTION, SOLUTION INTRAVENOUS at 07:00

## 2022-05-22 RX ADMIN — ALBUMIN (HUMAN) 12.5 G: 0.25 INJECTION, SOLUTION INTRAVENOUS at 08:32

## 2022-05-22 RX ADMIN — ALBUMIN (HUMAN) 12.5 G: 0.25 INJECTION, SOLUTION INTRAVENOUS at 23:36

## 2022-05-22 RX ADMIN — SODIUM CHLORIDE 40 MG: 9 INJECTION INTRAMUSCULAR; INTRAVENOUS; SUBCUTANEOUS at 09:02

## 2022-05-22 RX ADMIN — CITALOPRAM HYDROBROMIDE 20 MG: 20 TABLET ORAL at 19:38

## 2022-05-22 RX ADMIN — TRAZODONE HYDROCHLORIDE 25 MG: 50 TABLET ORAL at 22:46

## 2022-05-22 RX ADMIN — ALBUMIN (HUMAN) 12.5 G: 0.25 INJECTION, SOLUTION INTRAVENOUS at 11:43

## 2022-05-22 RX ADMIN — SODIUM CHLORIDE 40 MG: 9 INJECTION INTRAMUSCULAR; INTRAVENOUS; SUBCUTANEOUS at 22:46

## 2022-05-22 RX ADMIN — FOLIC ACID 1 MG: 1 TABLET ORAL at 09:02

## 2022-05-22 RX ADMIN — CARVEDILOL 3.12 MG: 3.12 TABLET, FILM COATED ORAL at 19:38

## 2022-05-22 NOTE — PROGRESS NOTES
Hospitalist Progress Note    NAME: Jayleen Mohr   :  1940   MRN:  [id number]     HPI excerpted from admission H&P:  Anil  is a 78 y.o.  female who presents with abnormal labs with low hemoglobin along with nausea and vomiting. Patient past medical history of iron deficiency anemia, hypertension, anxiety disorder, insomnia, hyperlipidemia. Patient had a fall few days ago and got a right humerus fracture. Patient had a follow-up appointment with PCP yesterday and got a lab work done. Last night patient got a call from the primary care doctor that hemoglobin is low and she needs to come to the hospital for evaluation. Currently the patient is doing fine had no chest pain or shortness of breath has on and off nausea with vomiting-by looking according to the daughter. No fever or chills, no headache, no dizziness, no passing out episodes. Patient is swelling the lower legs and has been on stockings. \"    Assessment / Plan:  Acute GIB  GERD  Acute blood loss anemia on chronic anemia (iron deficiency)  Thrombocytopenia   N/V  CT abdomen/pelvis 22:  1. No acute intra-abdominal pathology. No evidence of bowel obstruction. 2.   Shrunken liver with questionable nodular contour. Question history of cirrhosis. Abdominal pelvic ascites is noted with splenomegaly. 3.  Cholecystectomy. Common bile duct is mildly dilated which may be physiologic  given the patient's age. Recommend correlation with serum alkaline phosphatase. 4.  Nonobstructing right renal stone.  - GI input appreciated. - Patient received 1 unit PRBCs in ED.    - H/H overall stable since transfusion.    - Transfuse for Hgb <7.0  - Continue pantoprazole 40 mg IV q12h. - Plan for EGD on Monday. - Monitor blood count q12h. Dominic LE edema  Hypoalbuminemia  CXR 22:  No acute cardiopulmonary disease. Right humeral fracture. - Patient had echo completed in 2021, family to provide report.   - Hypoalbuminemia likely contributing to edema.  - Patient received dose of IV furosemide after blood. - Since edema mana without unilateral difference will hold on venous dopplers.     - Add albumin 12.5 gm IV q6h x 4 doses. JAE  - Avoid nephrotoxins.   - Adjust meds based on CrCl. - CXR does not reveal pulmonary edema, give albumin as noted above - avoid crystalloid 2/2 significant edema.  - Monitor. Recent right humerus fraction   CT head 05/20/22: Moderate chronic small vessel ischemic disease. No acute intracranial abnormality.  - RUE edematous. - Maintain sling.  - Elevate as able. - OP follow up with ortho on 06/03/22, per ortho patient not good candidate for surgical intervention. HTN  Dyslipidemia   PAD  - Continue carvedilol 3.125 mg po bid. - Continue atorvastatin 10 mg po daily.   - Hold pentoxifylline 2/2 GIB. Depression   Anxiety disorder  - Continue citalopram 20 mg po daily at hs. Insomnia   - Continue trazodone 25 mg po daily at hs.         25.0 - 29.9 Overweight / Body mass index is 28.9 kg/m². Estimated discharge date: May 24  Barriers:    Code status: Full  Prophylaxis: SCDs  Recommended Disposition: TBD     Subjective:     Chief Complaint / Reason for Physician Visit  No complaints. Denies abdominal pain. No haritha bleeding or melena. Plan of care and pertinent events reviewed with bedside nurse. Review of Systems:  Symptom Y/N Comments  Symptom Y/N Comments   Fever/Chills N   Chest Pain N    Poor Appetite N   Edema Y    Cough N   Abdominal Pain N    Sputum N   Joint Pain N    SOB/DAO N   Pruritis/Rash N    Nausea/vomit N None currently  Tolerating PT/OT     Diarrhea N   Tolerating Diet Y    Constipation N   Other       Could NOT obtain due to:      Objective:     VITALS:   Last 24hrs VS reviewed since prior progress note.  Most recent are:  Patient Vitals for the past 24 hrs:   Temp Pulse Resp BP SpO2   05/22/22 0448 (P) 97.8 °F (36.6 °C) (!) (P) 52 (P) 18 (!) (P) 113/52 (P) 94 %   05/21/22 2328 97.9 °F (36.6 °C) (!) 53 18 (!) 113/42 97 %   05/21/22 2039 97.8 °F (36.6 °C) 70 18 (!) 130/52 97 %   05/21/22 1603 98.5 °F (36.9 °C) (!) 55 18 (!) 121/54 95 %   05/21/22 1600 -- (!) 50 -- -- --   05/21/22 1200 -- (!) 50 -- -- --   05/21/22 1132 98.4 °F (36.9 °C) (!) 50 -- (!) 106/48 96 %   05/21/22 1050 -- 60 -- (!) 142/48 --   05/21/22 0800 -- (!) 55 -- -- --       Intake/Output Summary (Last 24 hours) at 5/22/2022 0747  Last data filed at 5/21/2022 2220  Gross per 24 hour   Intake --   Output 900 ml   Net -900 ml        I had a face to face encounter and independently examined this patient on 5/22/2022, as outlined below:  PHYSICAL EXAM:  General:  A/A/O.  NAD. HEENT:  Normocephalic. Sclera anicteric. Mucous membranes moist.    Chest:  Resps even/unlabored with symmetrical CWE. Air entry full. Lungs CTA. No use of accessory muscles. CV:  RRR. Normal S1/S2. No M/C/R appreciated. No JVD. RUE (fracture side) with 3 mm pitting edema. 2 mm pitting pretibial edema noted mana. Cap refill < 3 sec. Peripheral pulses 1+. GI:  Abdomen soft/NT/ND. ABT X 4.    :  Voiding. Neurologic:  [place]. Face symmetrical.  Speech normal.     Psych:  Cooperative. No anxiety or agitation. No suicidal/homicidal ideation. Skin:  Warm and color appropriate. No rashes or jaundice. Turgor elastic. Reviewed most current lab test results and cultures  YES  Reviewed most current radiology test results   YES  Review and summation of old records today    NO  Reviewed patient's current orders and MAR    YES  PMH/SH reviewed - no change compared to H&P  ________________________________________________________________________  Care Plan discussed with:    Comments   Patient [address]     Consultant                        Multidiciplinary team rounds were held today with , nursing, pharmacist and clinical coordinator.   Patient's plan of care was discussed; medications were reviewed and discharge planning was addressed. ________________________________________________________________________  Gaby Waktins NP     Procedures: see electronic medical records for all procedures/Xrays and details which were not copied into this note but were reviewed prior to creation of Plan. LABS:  I reviewed today's most current labs and imaging studies. Pertinent labs include:  Recent Labs     05/22/22  0511 05/21/22  1453 05/21/22  0538 05/20/22  1307 05/20/22  1307   WBC 4.5  --  5.0  --  4.4   HGB 8.2* 9.3* 8.3*   < > 5.9*   HCT 27.3* 30.0* 26.1*   < > 20.5*   *  --  113*  --  139*    < > = values in this interval not displayed.      Recent Labs     05/21/22  0538 05/20/22  1307    139   K 3.5 3.7    103   CO2 32 34*   GLU 96 157*   BUN 41* 42*   CREA 1.54* 1.61*   CA 7.8* 8.1*   MG 2.3  --    ALB  --  1.9*   TBILI  --  1.0   ALT  --  17   INR  --  1.3*       Signed: Gaby Watkins NP

## 2022-05-22 NOTE — PROGRESS NOTES
Spiritual Care Partner Volunteer visited patient at [address] in [facility] 3 SURG TELE on 5/22/2022   Documented by:     Trevin Lopez, MPS, [facility], Staff [address] [place] [address] Road Paging Service  [phone])

## 2022-05-22 NOTE — PROGRESS NOTES
End of Shift Note    Bedside shift change report given to Jared Brooke (oncoming nurse) by Memo Beck RN (offgoing nurse). Report included the following information SBAR, Kardex, Intake/Output, MAR and Recent Results    Shift worked:  7p-7a     Shift summary and any significant changes:     Mostly uneventful shift  Patient rested this shift  Patient had no complaints of pain  Patient ambulated to bedside commode   Concerns for physician to address:  None     Zone phone for oncoming shift:   [phone]       Activity:  Activity Level: Up with Assistance  Number times ambulated in hallways past shift: 0  Number of times OOB to chair past shift: 0    Cardiac:   Cardiac Monitoring: Yes      Cardiac Rhythm: Sinus Javy    Access:   Current line(s): PIV     Genitourinary:   Urinary status: voiding    Respiratory:   O2 Device: None (Room air)  Chronic home O2 use?: NO       GI:  Last Bowel Movement Date: 05/19/22  Current diet:  ADULT DIET Clear Liquid; 4 carb choices (60 gm/meal); Low Sodium (2 gm)  DIET NPO  Tolerating current diet: YES       Pain Management:   Patient states pain is manageable on current regimen: YES    Skin:  Zachary Score: 19  Interventions: increase time out of bed    Patient Safety:  Fall Score:  Total Score: 4  Interventions: assistive device (walker, cane, etc), gripper socks and pt to call before getting OOB  High Fall Risk: Yes    Length of Stay:  Expected LOS: - - -  Actual LOS: 2      Memo Beck RN

## 2022-05-23 ENCOUNTER — ANESTHESIA (OUTPATIENT)
Dept: ENDOSCOPY | Age: 82
DRG: 378 | End: 2022-05-23
Payer: MEDICARE

## 2022-05-23 ENCOUNTER — ANESTHESIA EVENT (OUTPATIENT)
Dept: ENDOSCOPY | Age: 82
DRG: 378 | End: 2022-05-23
Payer: MEDICARE

## 2022-05-23 LAB
ANION GAP SERPL CALC-SCNC: 4 MMOL/L (ref 5–15)
BASOPHILS # BLD: 0 K/UL (ref 0–0.1)
BASOPHILS NFR BLD: 1 % (ref 0–1)
BUN SERPL-MCNC: 31 MG/DL (ref 6–20)
BUN/CREAT SERPL: 22 (ref 12–20)
CALCIUM SERPL-MCNC: 8.2 MG/DL (ref 8.5–10.1)
CHLORIDE SERPL-SCNC: 106 MMOL/L (ref 97–108)
CO2 SERPL-SCNC: 32 MMOL/L (ref 21–32)
CREAT SERPL-MCNC: 1.39 MG/DL (ref 0.55–1.02)
DIFFERENTIAL METHOD BLD: ABNORMAL
EOSINOPHIL # BLD: 0.1 K/UL (ref 0–0.4)
EOSINOPHIL NFR BLD: 3 % (ref 0–7)
ERYTHROCYTE [DISTWIDTH] IN BLOOD BY AUTOMATED COUNT: 18.7 % (ref 11.5–14.5)
GLUCOSE SERPL-MCNC: 76 MG/DL (ref 65–100)
HCT VFR BLD AUTO: 24.1 % (ref 35–47)
HGB BLD-MCNC: 7.4 G/DL (ref 11.5–16)
IMM GRANULOCYTES # BLD AUTO: 0 K/UL (ref 0–0.04)
IMM GRANULOCYTES NFR BLD AUTO: 0 % (ref 0–0.5)
LYMPHOCYTES # BLD: 1 K/UL (ref 0.8–3.5)
LYMPHOCYTES NFR BLD: 29 % (ref 12–49)
MCH RBC QN AUTO: 28 PG (ref 26–34)
MCHC RBC AUTO-ENTMCNC: 30.7 G/DL (ref 30–36.5)
MCV RBC AUTO: 91.3 FL (ref 80–99)
MONOCYTES # BLD: 0.5 K/UL (ref 0–1)
MONOCYTES NFR BLD: 14 % (ref 5–13)
NEUTS SEG # BLD: 1.9 K/UL (ref 1.8–8)
NEUTS SEG NFR BLD: 53 % (ref 32–75)
NRBC # BLD: 0 K/UL (ref 0–0.01)
NRBC BLD-RTO: 0 PER 100 WBC
PLATELET # BLD AUTO: 97 K/UL (ref 150–400)
PLATELET COMMENTS,PCOM: ABNORMAL
PMV BLD AUTO: 11.1 FL (ref 8.9–12.9)
POTASSIUM SERPL-SCNC: 3.1 MMOL/L (ref 3.5–5.1)
RBC # BLD AUTO: 2.64 M/UL (ref 3.8–5.2)
RBC MORPH BLD: ABNORMAL
SODIUM SERPL-SCNC: 142 MMOL/L (ref 136–145)
WBC # BLD AUTO: 3.5 K/UL (ref 3.6–11)

## 2022-05-23 PROCEDURE — 0DB68ZX EXCISION OF STOMACH, VIA NATURAL OR ARTIFICIAL OPENING ENDOSCOPIC, DIAGNOSTIC: ICD-10-PCS | Performed by: INTERNAL MEDICINE

## 2022-05-23 PROCEDURE — 2709999900 HC NON-CHARGEABLE SUPPLY: Performed by: INTERNAL MEDICINE

## 2022-05-23 PROCEDURE — 74011250636 HC RX REV CODE- 250/636: Performed by: ANESTHESIOLOGY

## 2022-05-23 PROCEDURE — 74011000250 HC RX REV CODE- 250: Performed by: ANESTHESIOLOGY

## 2022-05-23 PROCEDURE — 36415 COLL VENOUS BLD VENIPUNCTURE: CPT

## 2022-05-23 PROCEDURE — 76060000032 HC ANESTHESIA 0.5 TO 1 HR: Performed by: INTERNAL MEDICINE

## 2022-05-23 PROCEDURE — 88305 TISSUE EXAM BY PATHOLOGIST: CPT

## 2022-05-23 PROCEDURE — P9047 ALBUMIN (HUMAN), 25%, 50ML: HCPCS | Performed by: NURSE PRACTITIONER

## 2022-05-23 PROCEDURE — 77030019988 HC FCPS ENDOSC DISP BSC -B: Performed by: INTERNAL MEDICINE

## 2022-05-23 PROCEDURE — 65270000029 HC RM PRIVATE

## 2022-05-23 PROCEDURE — 74011250636 HC RX REV CODE- 250/636: Performed by: NURSE PRACTITIONER

## 2022-05-23 PROCEDURE — 74011250637 HC RX REV CODE- 250/637: Performed by: STUDENT IN AN ORGANIZED HEALTH CARE EDUCATION/TRAINING PROGRAM

## 2022-05-23 PROCEDURE — 74011000250 HC RX REV CODE- 250: Performed by: INTERNAL MEDICINE

## 2022-05-23 PROCEDURE — 74011250636 HC RX REV CODE- 250/636: Performed by: INTERNAL MEDICINE

## 2022-05-23 PROCEDURE — C9113 INJ PANTOPRAZOLE SODIUM, VIA: HCPCS | Performed by: STUDENT IN AN ORGANIZED HEALTH CARE EDUCATION/TRAINING PROGRAM

## 2022-05-23 PROCEDURE — 85025 COMPLETE CBC W/AUTO DIFF WBC: CPT

## 2022-05-23 PROCEDURE — 0W3P8ZZ CONTROL BLEEDING IN GASTROINTESTINAL TRACT, VIA NATURAL OR ARTIFICIAL OPENING ENDOSCOPIC: ICD-10-PCS | Performed by: INTERNAL MEDICINE

## 2022-05-23 PROCEDURE — 76040000007: Performed by: INTERNAL MEDICINE

## 2022-05-23 PROCEDURE — 80048 BASIC METABOLIC PNL TOTAL CA: CPT

## 2022-05-23 PROCEDURE — 74011000250 HC RX REV CODE- 250: Performed by: STUDENT IN AN ORGANIZED HEALTH CARE EDUCATION/TRAINING PROGRAM

## 2022-05-23 PROCEDURE — 74011250636 HC RX REV CODE- 250/636: Performed by: STUDENT IN AN ORGANIZED HEALTH CARE EDUCATION/TRAINING PROGRAM

## 2022-05-23 PROCEDURE — 77030010936 HC CLP LIG BSC -C: Performed by: INTERNAL MEDICINE

## 2022-05-23 PROCEDURE — 88342 IMHCHEM/IMCYTCHM 1ST ANTB: CPT

## 2022-05-23 PROCEDURE — 74011250637 HC RX REV CODE- 250/637: Performed by: INTERNAL MEDICINE

## 2022-05-23 DEVICE — WORKING LENGTH 235CM, WORKING CHANNEL 2.8MM
Type: IMPLANTABLE DEVICE | Site: STOMACH | Status: FUNCTIONAL
Brand: RESOLUTION 360 CLIP

## 2022-05-23 RX ORDER — SODIUM CHLORIDE 0.9 % (FLUSH) 0.9 %
5-40 SYRINGE (ML) INJECTION AS NEEDED
Status: DISCONTINUED | OUTPATIENT
Start: 2022-05-23 | End: 2022-05-27 | Stop reason: HOSPADM

## 2022-05-23 RX ORDER — PANTOPRAZOLE SODIUM 40 MG/1
40 TABLET, DELAYED RELEASE ORAL
Status: DISCONTINUED | OUTPATIENT
Start: 2022-05-23 | End: 2022-05-27 | Stop reason: HOSPADM

## 2022-05-23 RX ORDER — SODIUM CHLORIDE 0.9 % (FLUSH) 0.9 %
5-40 SYRINGE (ML) INJECTION EVERY 8 HOURS
Status: DISCONTINUED | OUTPATIENT
Start: 2022-05-23 | End: 2022-05-27 | Stop reason: HOSPADM

## 2022-05-23 RX ORDER — SUCRALFATE 1 G/1
1 TABLET ORAL
Status: DISCONTINUED | OUTPATIENT
Start: 2022-05-23 | End: 2022-05-27 | Stop reason: HOSPADM

## 2022-05-23 RX ORDER — ALBUMIN HUMAN 250 G/1000ML
12.5 SOLUTION INTRAVENOUS EVERY 6 HOURS
Status: DISPENSED | OUTPATIENT
Start: 2022-05-23 | End: 2022-05-24

## 2022-05-23 RX ORDER — FLUMAZENIL 0.1 MG/ML
0.2 INJECTION INTRAVENOUS
Status: DISCONTINUED | OUTPATIENT
Start: 2022-05-23 | End: 2022-05-23 | Stop reason: HOSPADM

## 2022-05-23 RX ORDER — POTASSIUM CHLORIDE 7.45 MG/ML
10 INJECTION INTRAVENOUS
Status: DISPENSED | OUTPATIENT
Start: 2022-05-23 | End: 2022-05-23

## 2022-05-23 RX ORDER — PROPOFOL 10 MG/ML
INJECTION, EMULSION INTRAVENOUS AS NEEDED
Status: DISCONTINUED | OUTPATIENT
Start: 2022-05-23 | End: 2022-05-23 | Stop reason: HOSPADM

## 2022-05-23 RX ORDER — POTASSIUM CHLORIDE 7.45 MG/ML
10 INJECTION INTRAVENOUS
Status: COMPLETED | OUTPATIENT
Start: 2022-05-23 | End: 2022-05-23

## 2022-05-23 RX ORDER — EPHEDRINE SULFATE/0.9% NACL/PF 50 MG/5 ML
SYRINGE (ML) INTRAVENOUS AS NEEDED
Status: DISCONTINUED | OUTPATIENT
Start: 2022-05-23 | End: 2022-05-23 | Stop reason: HOSPADM

## 2022-05-23 RX ORDER — LIDOCAINE HYDROCHLORIDE 20 MG/ML
INJECTION, SOLUTION EPIDURAL; INFILTRATION; INTRACAUDAL; PERINEURAL AS NEEDED
Status: DISCONTINUED | OUTPATIENT
Start: 2022-05-23 | End: 2022-05-23 | Stop reason: HOSPADM

## 2022-05-23 RX ORDER — NALOXONE HYDROCHLORIDE 0.4 MG/ML
0.4 INJECTION, SOLUTION INTRAMUSCULAR; INTRAVENOUS; SUBCUTANEOUS
Status: DISCONTINUED | OUTPATIENT
Start: 2022-05-23 | End: 2022-05-23 | Stop reason: HOSPADM

## 2022-05-23 RX ORDER — EPINEPHRINE 0.1 MG/ML
1 INJECTION INTRACARDIAC; INTRAVENOUS
Status: DISCONTINUED | OUTPATIENT
Start: 2022-05-23 | End: 2022-05-23 | Stop reason: HOSPADM

## 2022-05-23 RX ORDER — ATROPINE SULFATE 0.1 MG/ML
0.5 INJECTION INTRAVENOUS
Status: DISCONTINUED | OUTPATIENT
Start: 2022-05-23 | End: 2022-05-23 | Stop reason: HOSPADM

## 2022-05-23 RX ORDER — SODIUM CHLORIDE 9 MG/ML
75 INJECTION, SOLUTION INTRAVENOUS CONTINUOUS
Status: DISCONTINUED | OUTPATIENT
Start: 2022-05-23 | End: 2022-05-23 | Stop reason: HOSPADM

## 2022-05-23 RX ORDER — DEXTROMETHORPHAN/PSEUDOEPHED 2.5-7.5/.8
1.2 DROPS ORAL
Status: DISCONTINUED | OUTPATIENT
Start: 2022-05-23 | End: 2022-05-23 | Stop reason: HOSPADM

## 2022-05-23 RX ADMIN — POTASSIUM CHLORIDE 10 MEQ: 7.46 INJECTION, SOLUTION INTRAVENOUS at 10:58

## 2022-05-23 RX ADMIN — ALBUMIN (HUMAN) 12.5 G: 0.25 INJECTION, SOLUTION INTRAVENOUS at 17:16

## 2022-05-23 RX ADMIN — SUCRALFATE 1 G: 1 TABLET ORAL at 11:02

## 2022-05-23 RX ADMIN — POTASSIUM CHLORIDE 10 MEQ: 7.46 INJECTION, SOLUTION INTRAVENOUS at 17:17

## 2022-05-23 RX ADMIN — PANTOPRAZOLE SODIUM 40 MG: 40 TABLET, DELAYED RELEASE ORAL at 16:03

## 2022-05-23 RX ADMIN — SUCRALFATE 1 G: 1 TABLET ORAL at 21:54

## 2022-05-23 RX ADMIN — LIDOCAINE HYDROCHLORIDE 80 MG: 20 INJECTION, SOLUTION EPIDURAL; INFILTRATION; INTRACAUDAL; PERINEURAL at 09:15

## 2022-05-23 RX ADMIN — SUCRALFATE 1 G: 1 TABLET ORAL at 16:03

## 2022-05-23 RX ADMIN — SODIUM CHLORIDE 75 ML/HR: 9 INJECTION, SOLUTION INTRAVENOUS at 08:33

## 2022-05-23 RX ADMIN — ATORVASTATIN CALCIUM 10 MG: 10 TABLET, FILM COATED ORAL at 21:54

## 2022-05-23 RX ADMIN — POTASSIUM CHLORIDE 10 MEQ: 7.46 INJECTION, SOLUTION INTRAVENOUS at 14:28

## 2022-05-23 RX ADMIN — CITALOPRAM HYDROBROMIDE 20 MG: 20 TABLET ORAL at 17:16

## 2022-05-23 RX ADMIN — PROPOFOL 100 MG: 10 INJECTION, EMULSION INTRAVENOUS at 09:29

## 2022-05-23 RX ADMIN — FOLIC ACID 1 MG: 1 TABLET ORAL at 11:00

## 2022-05-23 RX ADMIN — SODIUM CHLORIDE, PRESERVATIVE FREE 10 ML: 5 INJECTION INTRAVENOUS at 22:18

## 2022-05-23 RX ADMIN — POTASSIUM CHLORIDE 10 MEQ: 7.46 INJECTION, SOLUTION INTRAVENOUS at 18:56

## 2022-05-23 RX ADMIN — SODIUM CHLORIDE, PRESERVATIVE FREE 10 ML: 5 INJECTION INTRAVENOUS at 14:22

## 2022-05-23 RX ADMIN — ALBUMIN (HUMAN) 12.5 G: 0.25 INJECTION, SOLUTION INTRAVENOUS at 14:19

## 2022-05-23 RX ADMIN — TRAZODONE HYDROCHLORIDE 25 MG: 50 TABLET ORAL at 21:54

## 2022-05-23 RX ADMIN — POTASSIUM CHLORIDE 10 MEQ: 7.46 INJECTION, SOLUTION INTRAVENOUS at 16:00

## 2022-05-23 RX ADMIN — SODIUM CHLORIDE, PRESERVATIVE FREE 10 ML: 5 INJECTION INTRAVENOUS at 10:59

## 2022-05-23 RX ADMIN — Medication 10 MG: at 09:34

## 2022-05-23 RX ADMIN — CARVEDILOL 3.12 MG: 3.12 TABLET, FILM COATED ORAL at 11:00

## 2022-05-23 RX ADMIN — SODIUM CHLORIDE 40 MG: 9 INJECTION INTRAMUSCULAR; INTRAVENOUS; SUBCUTANEOUS at 11:00

## 2022-05-23 NOTE — WOUND CARE
Wound care noted a consult for this patient who has \"redness on the left buttocks\". Please assess and protect the skin during her surgery. Plan: will see her post op.   Michael Braun RN, BSN, [facility]

## 2022-05-23 NOTE — ANESTHESIA POSTPROCEDURE EVALUATION
Procedure(s):  ESOPHAGOGASTRODUODENOSCOPY (EGD)  ESOPHAGOGASTRODUODENAL (EGD) BIOPSY  RESOLUTION CLIP.    total IV anesthesia    Anesthesia Post Evaluation        Patient location during evaluation: PACU  Note status: Adequate. Level of consciousness: responsive to verbal stimuli and sleepy but conscious  Pain management: satisfactory to patient  Airway patency: patent  Anesthetic complications: no  Cardiovascular status: acceptable  Respiratory status: acceptable  Hydration status: acceptable  Comments: +Post-Anesthesia Evaluation and Assessment    Patient: Huey Ramos MRN: [id number]  SSN: [id number]   YOB: 1940  Age: 78 y.o. Sex: female      Cardiovascular Function/Vital Signs    BP (!) 141/36   Pulse (!) 48   Temp 36.6 °C (97.9 °F)   Resp 23   Ht 5' 2\" (1.575 m)   Wt 71.7 kg (158 lb)   SpO2 94%   BMI 28.90 kg/m²     Patient is status post Procedure(s):  ESOPHAGOGASTRODUODENOSCOPY (EGD)  ESOPHAGOGASTRODUODENAL (EGD) BIOPSY  RESOLUTION CLIP. Nausea/Vomiting: Controlled. Postoperative hydration reviewed and adequate. Pain:  Pain Scale 1: Numeric (0 - 10) (05/23/22 1000)  Pain Intensity 1: 0 (05/23/22 1000)   Managed. Neurological Status: At baseline. Mental Status and Level of Consciousness: Arousable. Pulmonary Status:   O2 Device: None (Room air) (05/23/22 1000)   Adequate oxygenation and airway patent. Complications related to anesthesia: None    Post-anesthesia assessment completed. No concerns. Signed By: Lowell Gleason DO    5/23/2022  Post anesthesia nausea and vomiting:  controlled      INITIAL Post-op Vital signs:   Vitals Value Taken Time   /36 05/23/22 1000   Temp 36.6 °C (97.9 °F) 05/23/22 0946   Pulse 49 05/23/22 1004   Resp 18 05/23/22 1004   SpO2 95 % 05/23/22 1003   Vitals shown include unvalidated device data.

## 2022-05-23 NOTE — ROUTINE PROCESS
Ge Maecruzito  1940  [id number]    Situation:  Verbal report received from: MK Rose RN  Procedure: Procedure(s):  ESOPHAGOGASTRODUODENOSCOPY (EGD)  ESOPHAGOGASTRODUODENAL (EGD) BIOPSY  RESOLUTION CLIP    Background:    Preoperative diagnosis: Melena [K92.1]  Acute blood loss anemia [D62]  Postoperative diagnosis: Gastritis, Hiatal Hernia    :  Dr. Roro Wilson  Assistant(s): Endoscopy Technician-1: Pari Quintero  Endoscopy RN-1: Sunitha Potts  Endoscopy RN-2: Zachery Dewey RN    Specimens:   ID Type Source Tests Collected by Time Destination   1 : biopsy Preservative Gastric  Mila Crandall MD 5/23/2022 1483 Pathology     H. Pylori  no    Assessment:  Intra-procedure medications     Anesthesia gave intra-procedure sedation and medications, see anesthesia flow sheet yes    Intravenous fluids: NS@ KVO     Vital signs stable   yes    Abdominal assessment: round and soft   yes    Recommendation:  .   Return to floor  Yes, inpatient, room 3237  Family or Friend  No one in room per patient  Permission to share finding with family or friend n/a

## 2022-05-23 NOTE — ANESTHESIA PREPROCEDURE EVALUATION
Anesthetic History   No history of anesthetic complications            Review of Systems / Medical History  Patient summary reviewed, nursing notes reviewed and pertinent labs reviewed    Pulmonary  Within defined limits                 Neuro/Psych       CVA  TIA and psychiatric history (depression)     Cardiovascular    Hypertension          Hyperlipidemia    Exercise tolerance: <4 METS  Comments: 5-20-22 EKG:  Sinus bradycardia with sinus arrhythmia with 1st degree AV block   When compared with ECG of 14-OCT-2020 20:43,   CT interval has increased   Nonspecific T wave abnormality, worse in Inferior leads   QT has shortened    GI/Hepatic/Renal     GERD          Comments: Melena, acute blood loss anemia, hgb 7.4      Hx of hemorrhagic gastritis Endo/Other    Diabetes: well controlled, type 2         Other Findings   Comments: [place]  Chronic pain         Physical Exam    Airway  Mallampati: I  TM Distance: 4 - 6 cm  Neck ROM: decreased range of motion   Mouth opening: Diminished (comment)     Cardiovascular    Rhythm: regular  Rate: normal         Dental    Dentition: Full lower dentures and Full upper dentures     Pulmonary  Breath sounds clear to auscultation               Abdominal  GI exam deferred       Other Findings            Anesthetic Plan    ASA: 3  Anesthesia type: total IV anesthesia          Induction: Intravenous  Anesthetic plan and risks discussed with: Patient      Hemoglobin, K, and plts are low

## 2022-05-23 NOTE — PROGRESS NOTES
Spoke with patient via telephone. She was unable to understand anything over the phone. May be hard of hearing. She gave me permission to speak with her daughter Craig Dominguez. Called Ms. Donya Perdomo. She didn't answer. Left her a voicemail to call me back.

## 2022-05-23 NOTE — PROCEDURES
NAME:  Kaleb Peoples   :   1940   MRN:   [id number]     Date/Time:  2022 9:30 AM    Esophagogastroduodenoscopy (EGD) Procedure Note    Procedure: Esophagogastroduodenoscopy with biopsy, control of bleeding    Indication:  Melena/hematochezia  Pre-operative Diagnosis: see indication above  Post-operative Diagnosis: see findings below  :  Yaquelin Sotelo MD  Referring Provider:   --Bony Katz MD    Exam:  Airway: clear, no airway problems anticipated  Heart: RRR, without gallops or rubs  Lungs: clear bilaterally without wheezes, crackles, or rhonchi  Abdomen: soft, nontender, nondistended, bowel sounds present  Mental Status: awake, alert and oriented to person, place and time     Anethesia/Sedation:  MAC anesthesia Propofol  Procedure Details   After informed consent was obtained for the procedure, with all risks and benefits of procedure explained the patient was taken to the endoscopy suite and placed in the left lateral decubitus position. Following sequential administration of sedation as per above, the [id number] gastroscope was inserted into the mouth and advanced under direct vision to third portion of the duodenum. A careful inspection was made as the gastroscope was withdrawn, including a retroflexed view of the proximal stomach; findings and interventions are described below. Findings:   1. Normal proximal and mid esophagus  2. Small, sliding hiatal hernia  3. Severe, diffuse, hemorrhagic antral gastropathy with contact bleeding. Biopsied. Hemostatic clip placed x 1 for continued oozing bleeding from biopsy site with hemostasis achieved. 4. Stomach otherwise normal, including retroflexion  5. Normal duodenal bulb and 2nd/3rd portion of the duodenum    Therapies: 1. Biopsies 2. Hemostasis    Specimens: 1. Gastric    EBL:  None. Complications:   None; patient tolerated the procedure well. Impression:    1.  Normal proximal and mid esophagus  2. Small, sliding hiatal hernia  3. Severe, diffuse, hemorrhagic antral gastropathy with contact bleeding. Biopsied. Hemostatic clip placed x 1 for continued oozing bleeding from biopsy site with hemostasis achieved. 4. Stomach otherwise normal, including retroflexion  5. Normal duodenal bulb and 2nd/3rd portion of the duodenum    Patient's hemorrhagic gastropathy is likely cause of her GI bleeding    Recommendations:  1. Start CLD and ADAT  2. Continue BID PPI and add Sucralfate QID x 2 weeks  3. Avoid non-essential NSAID's  4. Follow up pathology  5. No further endoscopic procedures indicated or planned    GI will sign off.  Feel free to call with any questions    Discharge disposition:  Back to floor following recovery in endoscopy    Franklin Irby MD

## 2022-05-23 NOTE — PERIOP NOTES
Endoscope was pre-cleaned at bedside immediately following procedure by MARIA LUZ Xiong and Yaa Celis. Glasses returned to patient post procure.     Medications     lidocaine (PF) 2% (mg)     Date/Time Rate/Dose/Volume Action Route Admin User Audit       05/23/22  0915 80 mg Given IntraVENous Lisa ROSE DO            propofol 10 mg/mL (mg)     Date/Time Rate/Dose/Volume Action Route Admin User Audit       05/23/22  0929 100 mg Given IntraVENous Lisa ROSE DO      Comment: titrated over case

## 2022-05-23 NOTE — PROGRESS NOTES
End of Shift Note    Bedside shift change report given to Goyo Turner RN (oncoming nurse) by Noreen Boyer LPN (offgoing nurse). Report included the following information SBAR, Kardex, Intake/Output and MAR    Shift worked:  7a-7p     Shift summary and any significant changes:    Patient tolerating diet well and has been A&Ox4. Arm is still swollen and in sling. Uneventful shift. Concerns for physician to address: none   Zone phone for oncoming shift:  [phone]     Activity:  Activity Level: Up with Assistance  Number times ambulated in hallways past shift: 0  Number of times OOB to chair past shift: 2    Cardiac:   Cardiac Monitoring: Yes      Cardiac Rhythm: Sinus Javy    Access:   Current line(s): PIV     Genitourinary:   Urinary status: voiding    Respiratory:   O2 Device: None (Room air)  Chronic home O2 use?: NO  Incentive spirometer at bedside: NO       GI:  Last Bowel Movement Date: 05/20/22  Current diet:  ADULT DIET Clear Liquid; 4 carb choices (60 gm/meal); Low Sodium (2 gm)  DIET NPO  Passing flatus: YES  Tolerating current diet: YES       Pain Management:   Patient states pain is manageable on current regimen: YES    Skin:  Zachary Score: 18  Interventions: increase time out of bed, PT/OT consult and nutritional support     Patient Safety:  Fall Score:  Total Score: 4  Interventions: gripper socks and pt to call before getting OOB  High Fall Risk: Yes    Length of Stay:  Expected LOS: - - -  Actual LOS: 2      Noreen Boyer LPN

## 2022-05-23 NOTE — PROGRESS NOTES
Hospitalist Progress Note    NAME: Gianna Burnham   :  1940   MRN:  [id number]     HPI excerpted from admission H&P:  Lina John is a 78 y.o.  female who presents with abnormal labs with low hemoglobin along with nausea and vomiting. Patient past medical history of iron deficiency anemia, hypertension, anxiety disorder, insomnia, hyperlipidemia. Patient had a fall few days ago and got a right humerus fracture. Patient had a follow-up appointment with PCP yesterday and got a lab work done. Last night patient got a call from the primary care doctor that hemoglobin is low and she needs to come to the hospital for evaluation. Currently the patient is doing fine had no chest pain or shortness of breath has on and off nausea with vomiting-by looking according to the daughter. No fever or chills, no headache, no dizziness, no passing out episodes. Patient is swelling the lower legs and has been on stockings. \"    Assessment / Plan:  Acute GIB  GERD  Acute blood loss anemia on chronic anemia (iron deficiency)  Thrombocytopenia   N/V  CT abdomen/pelvis 22:  1. No acute intra-abdominal pathology. No evidence of bowel obstruction. 2.   Shrunken liver with questionable nodular contour. Question history of cirrhosis. Abdominal pelvic ascites is noted with splenomegaly. 3.  Cholecystectomy. Common bile duct is mildly dilated which may be physiologic  given the patient's age. Recommend correlation with serum alkaline phosphatase. 4.  Nonobstructing right renal stone. EGD 22:  1. Normal proximal and mid esophagus  2. Small, sliding hiatal hernia  3. Severe, diffuse, hemorrhagic antral gastropathy with contact bleeding. Biopsied. Hemostatic clip placed x 1 for continued oozing bleeding from biopsy site with hemostasis achieved. 4.  Stomach otherwise normal, including retroflexion  5. Normal duodenal bulb and 2nd/3rd portion of the duodenum  - GI input appreciated.    - Patient received 1 unit PRBCs in ED.    - H/H trending down over past 48h 9.3=>8.2=>7.4   - Transfuse for Hgb <7.0  - Continue pantoprazole 40 mg IV q12h. - Sucralfate 1 gm po qid added, will need for 2 weeks. - Monitor blood count q12h.   - OP follow up with GI. Dominic LE edema  Hypoalbuminemia  CXR 05/21/22:  No acute cardiopulmonary disease. Right humeral fracture. - Patient had echo completed in November of 2021, family to provide report. - Hypoalbuminemia likely contributing to edema.  - Patient received dose of IV furosemide after blood. - Since edema dominic without unilateral difference will hold on venous dopplers.     - Continue albumin 12.5 gm IV q6h x 4 additional doses. JAE  - Renal function continues slow improvement. - Avoid nephrotoxins.   - Adjust meds based on CrCl. - CXR does not reveal pulmonary edema, give albumin as noted above - avoid crystalloid 2/2 significant edema.  - Monitor. Hypokalemia   - Supplement. - Monitor. Recent right humerus fraction   CT head 05/20/22: Moderate chronic small vessel ischemic disease. No acute intracranial abnormality.  - RUE edematous. - Maintain sling.  - Elevate as able. - OP follow up with ortho on 06/03/22, per ortho patient not good candidate for surgical intervention. HTN  Dyslipidemia   PAD  - Continue carvedilol 3.125 mg po bid. - Continue atorvastatin 10 mg po daily.   - Hold pentoxifylline 2/2 GIB. Depression   Anxiety disorder  - Continue citalopram 20 mg po daily at hs. Insomnia   - Continue trazodone 25 mg po daily at hs.         25.0 - 29.9 Overweight / Body mass index is 28.9 kg/m². Estimated discharge date: May 24  Barriers:    Code status: Full  Prophylaxis: SCDs  Recommended Disposition: TBD     Subjective:     Chief Complaint / Reason for Physician Visit  No complaints. Denies abdominal pain. Patient reports ongoing melena. Plan of care and pertinent events reviewed with bedside nurse. Review of Systems:  Symptom Y/N Comments  Symptom Y/N Comments   Fever/Chills N   Chest Pain N    Poor Appetite N   Edema Y    Cough N   Abdominal Pain N    Sputum N   Joint Pain N    SOB/DAO N   Pruritis/Rash N    Nausea/vomit N None currently  Tolerating PT/OT     Diarrhea N   Tolerating Diet Y    Constipation N   Other       Could NOT obtain due to:      Objective:     VITALS:   Last 24hrs VS reviewed since prior progress note. Most recent are:  Patient Vitals for the past 24 hrs:   Temp Pulse Resp BP SpO2   05/23/22 1136 98 °F (36.7 °C) (!) 53 16 (!) 143/55 97 %   05/23/22 1000 -- (!) 48 23 (!) 141/36 94 %   05/23/22 0957 -- (!) 56 23 (!) 138/34 94 %   05/23/22 0954 -- (!) 48 21 -- 95 %   05/23/22 0950 -- (!) 48 21 -- 95 %   05/23/22 0948 -- (!) 47 24 (!) 128/33 95 %   05/23/22 0946 97.9 °F (36.6 °C) (!) 51 22 (!) 124/36 96 %   05/23/22 0940 -- (!) 50 17 (!) 129/36 95 %   05/23/22 0937 -- (!) 50 20 (!) 118/33 97 %   05/23/22 0827 97.7 °F (36.5 °C) (!) 52 20 (!) 152/43 96 %   05/23/22 0330 97.8 °F (36.6 °C) 61 18 (!) 147/55 100 %   05/22/22 2300 98 °F (36.7 °C) 62 18 (!) 142/52 100 %   05/22/22 1955 -- -- -- -- 99 %   05/22/22 1925 97.9 °F (36.6 °C) 60 18 (!) 145/49 100 %       Intake/Output Summary (Last 24 hours) at 5/23/2022 1303  Last data filed at 5/23/2022 1001  Gross per 24 hour   Intake 350 ml   Output 1200 ml   Net -850 ml        I had a face to face encounter and independently examined this patient on 5/23/2022, as outlined below:  PHYSICAL EXAM:  General:  A/A/O.  NAD. HEENT:  Normocephalic. Sclera anicteric. Mucous membranes moist.    Chest:  Resps even/unlabored with symmetrical CWE. Air entry full. Lungs CTA. No use of accessory muscles. CV:  RRR. Normal S1/S2. No M/C/R appreciated. No JVD. RUE (fracture side) with 3 mm pitting edema. 2 mm pitting pretibial edema noted mana. Cap refill < 3 sec. Peripheral pulses 1+. GI:  Abdomen soft/NT/ND. ABT X 4.    :  Voiding. Neurologic:  [place]. Face symmetrical.  Speech normal.     Psych:  Cooperative. No anxiety or agitation. No suicidal/homicidal ideation. Skin:  Warm and color appropriate. No rashes or jaundice. Turgor elastic. Reviewed most current lab test results and cultures  YES  Reviewed most current radiology test results   YES  Review and summation of old records today    NO  Reviewed patient's current orders and MAR    YES  PMH/SH reviewed - no change compared to H&P  ________________________________________________________________________  Care Plan discussed with:    Comments   Patient [address]     Consultant                        Multidiciplinary team rounds were held today with , nursing, pharmacist and clinical coordinator. Patient's plan of care was discussed; medications were reviewed and discharge planning was addressed. ________________________________________________________________________  Geovanni Watson NP     Procedures: see electronic medical records for all procedures/Xrays and details which were not copied into this note but were reviewed prior to creation of Plan. LABS:  I reviewed today's most current labs and imaging studies. Pertinent labs include:  Recent Labs     05/23/22  0243 05/22/22  0511 05/21/22  1453 05/21/22  0538 05/21/22  0538   WBC 3.5* 4.5  --   --  5.0   HGB 7.4* 8.2* 9.3*   < > 8.3*   HCT 24.1* 27.3* 30.0*   < > 26.1*   PLT 97* 106*  --   --  113*    < > = values in this interval not displayed.      Recent Labs     05/23/22  0243 05/21/22  0538 05/20/22  1307    140 139   K 3.1* 3.5 3.7    106 103   CO2 32 32 34*   GLU 76 96 157*   BUN 31* 41* 42*   CREA 1.39* 1.54* 1.61*   CA 8.2* 7.8* 8.1*   MG  --  2.3  --    ALB  --   --  1.9*   TBILI  --   --  1.0   ALT  --   --  17   INR  --   --  1.3*       Signed: Geovanni Watson, ANGI

## 2022-05-23 NOTE — PROGRESS NOTES
Problem: Falls - Risk of  Goal: *Absence of Falls  Description: Document [place] Fall Risk and appropriate interventions in the flowsheet. Outcome: Progressing Towards Goal  Note: Fall Risk Interventions:  Mobility Interventions: Patient to call before getting OOB         Medication Interventions: Patient to call before getting OOB    Elimination Interventions: Call light in reach,Patient to call for help with toileting needs    History of Falls Interventions: Utilize gait belt for transfer/ambulation         Problem: Patient Education: Go to Patient Education Activity  Goal: Patient/Family Education  Outcome: Progressing Towards Goal     Problem: Pressure Injury - Risk of  Goal: *Prevention of pressure injury  Description: Document Zachary Scale and appropriate interventions in the flowsheet.   Outcome: Progressing Towards Goal  Note: Pressure Injury Interventions:  Sensory Interventions: Assess changes in LOC    Moisture Interventions: Absorbent underpads    Activity Interventions: Increase time out of bed    Mobility Interventions: HOB 30 degrees or less    Nutrition Interventions: Document food/fluid/supplement intake    Friction and Shear Interventions: HOB 30 degrees or less                Problem: Patient Education: Go to Patient Education Activity  Goal: Patient/Family Education  Outcome: Progressing Towards Goal     Problem: Patient Education: Go to Patient Education Activity  Goal: Patient/Family Education  Outcome: Progressing Towards Goal     Problem: Patient Education: Go to Patient Education Activity  Goal: Patient/Family Education  Outcome: Progressing Towards Goal

## 2022-05-23 NOTE — PERIOP NOTES
TRANSFER - OUT REPORT:    Verbal report given to Teodoro Davies RN(name) on Eli Crandall  being transferred to [facility](unit) for routine progression of care       Report consisted of patients Situation, Background, Assessment and   Recommendations(SBAR). Information from the following report(s) SBAR, Procedure Summary, Intake/Output, MAR and Recent Results was reviewed with the receiving nurse. Lines:   Peripheral IV 05/23/22 Anterior;Distal;Left Forearm (Active)        Opportunity for questions and clarification was provided.

## 2022-05-24 ENCOUNTER — APPOINTMENT (OUTPATIENT)
Dept: NON INVASIVE DIAGNOSTICS | Age: 82
DRG: 378 | End: 2022-05-24
Attending: NURSE PRACTITIONER
Payer: MEDICARE

## 2022-05-24 LAB
ANION GAP SERPL CALC-SCNC: 5 MMOL/L (ref 5–15)
BASOPHILS # BLD: 0 K/UL (ref 0–0.1)
BASOPHILS NFR BLD: 0 % (ref 0–1)
BUN SERPL-MCNC: 29 MG/DL (ref 6–20)
BUN/CREAT SERPL: 19 (ref 12–20)
CALCIUM SERPL-MCNC: 8 MG/DL (ref 8.5–10.1)
CALCULATED R AXIS, ECG10: 2 DEGREES
CALCULATED T AXIS, ECG11: 18 DEGREES
CHLORIDE SERPL-SCNC: 108 MMOL/L (ref 97–108)
CO2 SERPL-SCNC: 29 MMOL/L (ref 21–32)
COMMENT, HOLDF: NORMAL
CREAT SERPL-MCNC: 1.5 MG/DL (ref 0.55–1.02)
DIAGNOSIS, 93000: NORMAL
DIFFERENTIAL METHOD BLD: ABNORMAL
EOSINOPHIL # BLD: 0.1 K/UL (ref 0–0.4)
EOSINOPHIL NFR BLD: 3 % (ref 0–7)
ERYTHROCYTE [DISTWIDTH] IN BLOOD BY AUTOMATED COUNT: 18.8 % (ref 11.5–14.5)
GLUCOSE SERPL-MCNC: 155 MG/DL (ref 65–100)
HCT VFR BLD AUTO: 22.1 % (ref 35–47)
HCT VFR BLD AUTO: 27.2 % (ref 35–47)
HGB BLD-MCNC: 6.8 G/DL (ref 11.5–16)
HGB BLD-MCNC: 8.5 G/DL (ref 11.5–16)
HISTORY CHECKED?,CKHIST: NORMAL
IMM GRANULOCYTES # BLD AUTO: 0 K/UL (ref 0–0.04)
IMM GRANULOCYTES NFR BLD AUTO: 0 % (ref 0–0.5)
LYMPHOCYTES # BLD: 0.7 K/UL (ref 0.8–3.5)
LYMPHOCYTES NFR BLD: 22 % (ref 12–49)
MCH RBC QN AUTO: 28.8 PG (ref 26–34)
MCHC RBC AUTO-ENTMCNC: 30.8 G/DL (ref 30–36.5)
MCV RBC AUTO: 93.6 FL (ref 80–99)
MONOCYTES # BLD: 0.4 K/UL (ref 0–1)
MONOCYTES NFR BLD: 13 % (ref 5–13)
NEUTS SEG # BLD: 2.1 K/UL (ref 1.8–8)
NEUTS SEG NFR BLD: 62 % (ref 32–75)
NRBC # BLD: 0 K/UL (ref 0–0.01)
NRBC BLD-RTO: 0 PER 100 WBC
PLATELET # BLD AUTO: 80 K/UL (ref 150–400)
PMV BLD AUTO: 10.6 FL (ref 8.9–12.9)
POTASSIUM SERPL-SCNC: 3.3 MMOL/L (ref 3.5–5.1)
Q-T INTERVAL, ECG07: 448 MS
QRS DURATION, ECG06: 84 MS
QTC CALCULATION (BEZET), ECG08: 400 MS
RBC # BLD AUTO: 2.36 M/UL (ref 3.8–5.2)
RBC MORPH BLD: ABNORMAL
SAMPLES BEING HELD,HOLD: NORMAL
SODIUM SERPL-SCNC: 142 MMOL/L (ref 136–145)
VENTRICULAR RATE, ECG03: 48 BPM
WBC # BLD AUTO: 3.3 K/UL (ref 3.6–11)

## 2022-05-24 PROCEDURE — 74011250637 HC RX REV CODE- 250/637: Performed by: STUDENT IN AN ORGANIZED HEALTH CARE EDUCATION/TRAINING PROGRAM

## 2022-05-24 PROCEDURE — 85018 HEMOGLOBIN: CPT

## 2022-05-24 PROCEDURE — 93005 ELECTROCARDIOGRAM TRACING: CPT

## 2022-05-24 PROCEDURE — 74011250637 HC RX REV CODE- 250/637: Performed by: INTERNAL MEDICINE

## 2022-05-24 PROCEDURE — 80048 BASIC METABOLIC PNL TOTAL CA: CPT

## 2022-05-24 PROCEDURE — 36430 TRANSFUSION BLD/BLD COMPNT: CPT

## 2022-05-24 PROCEDURE — 74011000250 HC RX REV CODE- 250: Performed by: INTERNAL MEDICINE

## 2022-05-24 PROCEDURE — P9047 ALBUMIN (HUMAN), 25%, 50ML: HCPCS | Performed by: NURSE PRACTITIONER

## 2022-05-24 PROCEDURE — 86900 BLOOD TYPING SEROLOGIC ABO: CPT

## 2022-05-24 PROCEDURE — P9016 RBC LEUKOCYTES REDUCED: HCPCS

## 2022-05-24 PROCEDURE — 36415 COLL VENOUS BLD VENIPUNCTURE: CPT

## 2022-05-24 PROCEDURE — 74011250637 HC RX REV CODE- 250/637: Performed by: NURSE PRACTITIONER

## 2022-05-24 PROCEDURE — 86923 COMPATIBILITY TEST ELECTRIC: CPT

## 2022-05-24 PROCEDURE — 74011250636 HC RX REV CODE- 250/636: Performed by: NURSE PRACTITIONER

## 2022-05-24 PROCEDURE — 65270000029 HC RM PRIVATE

## 2022-05-24 PROCEDURE — 93306 TTE W/DOPPLER COMPLETE: CPT

## 2022-05-24 PROCEDURE — P9045 ALBUMIN (HUMAN), 5%, 250 ML: HCPCS | Performed by: NURSE PRACTITIONER

## 2022-05-24 PROCEDURE — 85025 COMPLETE CBC W/AUTO DIFF WBC: CPT

## 2022-05-24 RX ORDER — SODIUM CHLORIDE 9 MG/ML
250 INJECTION, SOLUTION INTRAVENOUS AS NEEDED
Status: DISCONTINUED | OUTPATIENT
Start: 2022-05-24 | End: 2022-05-27 | Stop reason: HOSPADM

## 2022-05-24 RX ORDER — POTASSIUM CHLORIDE 750 MG/1
40 TABLET, FILM COATED, EXTENDED RELEASE ORAL
Status: COMPLETED | OUTPATIENT
Start: 2022-05-24 | End: 2022-05-24

## 2022-05-24 RX ORDER — BALSAM PERU/CASTOR OIL
OINTMENT (GRAM) TOPICAL DAILY
Status: DISCONTINUED | OUTPATIENT
Start: 2022-05-24 | End: 2022-05-27 | Stop reason: HOSPADM

## 2022-05-24 RX ORDER — ALBUMIN HUMAN 50 G/1000ML
25 SOLUTION INTRAVENOUS ONCE
Status: COMPLETED | OUTPATIENT
Start: 2022-05-24 | End: 2022-05-24

## 2022-05-24 RX ADMIN — ALBUMIN (HUMAN) 12.5 G: 0.25 INJECTION, SOLUTION INTRAVENOUS at 00:14

## 2022-05-24 RX ADMIN — SUCRALFATE 1 G: 1 TABLET ORAL at 07:01

## 2022-05-24 RX ADMIN — SODIUM CHLORIDE, PRESERVATIVE FREE 10 ML: 5 INJECTION INTRAVENOUS at 22:00

## 2022-05-24 RX ADMIN — ACETAMINOPHEN 650 MG: 325 TABLET ORAL at 11:32

## 2022-05-24 RX ADMIN — SUCRALFATE 1 G: 1 TABLET ORAL at 11:32

## 2022-05-24 RX ADMIN — CITALOPRAM HYDROBROMIDE 20 MG: 20 TABLET ORAL at 17:23

## 2022-05-24 RX ADMIN — ATORVASTATIN CALCIUM 10 MG: 10 TABLET, FILM COATED ORAL at 22:59

## 2022-05-24 RX ADMIN — POTASSIUM CHLORIDE 40 MEQ: 750 TABLET, EXTENDED RELEASE ORAL at 08:53

## 2022-05-24 RX ADMIN — PANTOPRAZOLE SODIUM 40 MG: 40 TABLET, DELAYED RELEASE ORAL at 06:45

## 2022-05-24 RX ADMIN — SUCRALFATE 1 G: 1 TABLET ORAL at 22:59

## 2022-05-24 RX ADMIN — TRAZODONE HYDROCHLORIDE 25 MG: 50 TABLET ORAL at 22:59

## 2022-05-24 RX ADMIN — ACETAMINOPHEN 650 MG: 325 TABLET ORAL at 18:30

## 2022-05-24 RX ADMIN — SODIUM CHLORIDE, PRESERVATIVE FREE 10 ML: 5 INJECTION INTRAVENOUS at 07:01

## 2022-05-24 RX ADMIN — PANTOPRAZOLE SODIUM 40 MG: 40 TABLET, DELAYED RELEASE ORAL at 16:31

## 2022-05-24 RX ADMIN — SUCRALFATE 1 G: 1 TABLET ORAL at 16:31

## 2022-05-24 RX ADMIN — ALBUMIN (HUMAN) 25 G: 12.5 INJECTION, SOLUTION INTRAVENOUS at 01:52

## 2022-05-24 RX ADMIN — SODIUM CHLORIDE, PRESERVATIVE FREE 10 ML: 5 INJECTION INTRAVENOUS at 15:02

## 2022-05-24 RX ADMIN — FOLIC ACID 1 MG: 1 TABLET ORAL at 08:53

## 2022-05-24 RX ADMIN — CASTOR OIL AND BALSAM, PERU: 788; 87 OINTMENT TOPICAL at 16:31

## 2022-05-24 NOTE — PROGRESS NOTES
Care Management Interventions  PCP Verified by CM: Yes Anna Theodore MD )  Last Visit to PCP: 05/19/22  Palliative Care Criteria Met (RRAT>21 & CHF Dx)?: No (No MD order)  Mode of Transport at Discharge: Other (see comment) (POV)  Transition of Care Consult (CM Consult): Discharge Planning  Physical Therapy Consult: Yes  Occupational Therapy Consult: Yes  Speech Therapy Consult: No  Support Systems: Child(diane)  Confirm Follow Up Transport: Family  The Plan for Transition of Care is Related to the Following Treatment Goals : Treat GI bleed  Discharge Location  Patient Expects to be Discharged to[de-identified] Home with home health    Patient lives at home with her daughter Rebeca Henderson and her family. Spoke with Ms. Chikis Rodriguez who states that she is the patient's POA. Told her there is not ACP document on file and asked for her to bring it in next time she visits her mother. She states she will. Patient uses a walker at home and was getting around okay before she fell. Daughter was asking about assistance with a ramp into the home. Told her about [employer] (volunteer organization) that may be able to assist her in getting a ramp for the patient. She stated understanding. Told Ms. Camargo to contact care management at [facility] for further discharge planning needs. She stated understanding. Reason for Admission:   GI bleed                    RUR Score:     15% MODERATE              PCP: First and Last name:   Pranav Murray MD     Name of Practice:    Are you a current patient: Yes/No: yes   Approximate date of last visit: 5/19/2022   Can you participate in a virtual visit if needed: Yes    Do you (patient/family) have any concerns for transition/discharge? Weakness              Plan for utilizing home health:   Yes. May also benefit from skilled care.      Current Advanced Directive/Advance Care Plan:  Full Code      Healthcare Decision Maker:   Click here to complete HealthCare Decision Makers including selection of the Healthcare Decision Maker Relationship (ie \"Primary\")              Transition of Care Plan:          Advance Care Planning     General Advance Care Planning (ACP) Conversation      Date of Conversation: 5/20/2022  Conducted with: Patient with Decision Making Capacity    Healthcare Decision Maker:   No healthcare decision makers have been documented. Click here to complete [place] including selection of the Healthcare Decision Maker Relationship (ie \"Primary\")    Today we documented Decision Maker(s). The patient will provide ACP documents. Content/Action Overview:    Has ACP document(s) NOT on file - requested patient to provide  Topics discussed: treatment goals  Additional Comments: n/a     Length of Voluntary ACP Conversation in minutes:  16 minutes    [place]

## 2022-05-24 NOTE — PROGRESS NOTES
Problem: Falls - Risk of  Goal: *Absence of Falls  Description: Document [place] Fall Risk and appropriate interventions in the flowsheet. Outcome: Progressing Towards Goal  Note: Fall Risk Interventions:  Mobility Interventions: Patient to call before getting OOB         Medication Interventions: Patient to call before getting OOB    Elimination Interventions: Call light in reach,Patient to call for help with toileting needs    History of Falls Interventions: Utilize gait belt for transfer/ambulation         Problem: Patient Education: Go to Patient Education Activity  Goal: Patient/Family Education  Outcome: Progressing Towards Goal     Problem: Pressure Injury - Risk of  Goal: *Prevention of pressure injury  Description: Document Zachary Scale and appropriate interventions in the flowsheet.   Outcome: Progressing Towards Goal  Note: Pressure Injury Interventions:  Sensory Interventions: Assess changes in LOC    Moisture Interventions: Absorbent underpads    Activity Interventions: Increase time out of bed    Mobility Interventions: HOB 30 degrees or less    Nutrition Interventions: Document food/fluid/supplement intake    Friction and Shear Interventions: HOB 30 degrees or less

## 2022-05-24 NOTE — PROGRESS NOTES
Pt was having low heart rate in the mid to high 30's. NP was reached and EKG was ordered as well as Albumin. EKG was done and it showed that pt has A-fib. Albumin infusion in progress and pt is now sustaining heart rate in the 50's and above.

## 2022-05-24 NOTE — PROGRESS NOTES
Pt heart rate keeps going in and out of mid to high 30's. NP notified. Current vs: BP is 93/43, P= 44, T =98.8, R = 16. Awaiting response from the NP. Pt is alert and has no s/s of distress.

## 2022-05-24 NOTE — PROGRESS NOTES
Hospitalist Progress Note    NAME: Hernandez Dan   :  1940   MRN:  [id number]     HPI excerpted from admission H&P:  Rosita Muñiz is a 78 y.o.  female who presents with abnormal labs with low hemoglobin along with nausea and vomiting. Patient past medical history of iron deficiency anemia, hypertension, anxiety disorder, insomnia, hyperlipidemia. Patient had a fall few days ago and got a right humerus fracture. Patient had a follow-up appointment with PCP yesterday and got a lab work done. Last night patient got a call from the primary care doctor that hemoglobin is low and she needs to come to the hospital for evaluation. Currently the patient is doing fine had no chest pain or shortness of breath has on and off nausea with vomiting-by looking according to the daughter. No fever or chills, no headache, no dizziness, no passing out episodes. Patient is swelling the lower legs and has been on stockings. \"    Assessment / Plan:  Acute GIB  GERD  Acute blood loss anemia on chronic anemia (iron deficiency)  Thrombocytopenia   N/V  CT abdomen/pelvis 22:  1. No acute intra-abdominal pathology. No evidence of bowel obstruction. 2.   Shrunken liver with questionable nodular contour. Question history of cirrhosis. Abdominal pelvic ascites is noted with splenomegaly. 3.  Cholecystectomy. Common bile duct is mildly dilated which may be physiologic  given the patient's age. Recommend correlation with serum alkaline phosphatase. 4.  Nonobstructing right renal stone. EGD 22:  1. Normal proximal and mid esophagus  2. Small, sliding hiatal hernia  3. Severe, diffuse, hemorrhagic antral gastropathy with contact bleeding. Biopsied. Hemostatic clip placed x 1 for continued oozing bleeding from biopsy site with hemostasis achieved. 4.  Stomach otherwise normal, including retroflexion  5. Normal duodenal bulb and 2nd/3rd portion of the duodenum  - GI input appreciated.    - Patient received 1 unit PRBCs in ED and additional unit ordered today for Hgb 6.8 associated with hypotension.    - Transfuse for Hgb <7.0  - Continue pantoprazole 40 mg IV q12h. - Continue sucralfate 1 gm po qid, will need for 2 weeks. - Monitor blood count q12h.   - OP follow up with GI. Dominic LE edema  Hypoalbuminemia  CXR 05/21/22:  No acute cardiopulmonary disease. Right humeral fracture. - Patient had echo completed in November of 2021, family to provide report however have not - obtain echo. - Hypoalbuminemia likely contributing to edema.  - Patient received dose of IV furosemide after first unit of blood. - Since edema dominic without unilateral difference will hold on venous dopplers.     - Patient received albumin for 8 doses and additional dose over noc last for hypotension. JAE  - Slight decline in renal function today, likely pre-renal.  - Avoid nephrotoxins.   - Adjust meds based on CrCl. - Monitor. Hypokalemia   - Supplement. - Monitor. Recent right humerus fraction   CT head 05/20/22: Moderate chronic small vessel ischemic disease. No acute intracranial abnormality.  - RUE edematous. - Maintain sling.  - Elevate as able. - OP follow up with ortho on 06/03/22, per ortho patient not good candidate for surgical intervention. HTN with hypotensive episode   Dyslipidemia   PAD  - Continue carvedilol 3.125 mg po bid. - Continue atorvastatin 10 mg po daily.   - Hold pentoxifylline 2/2 GIB. Depression   Anxiety disorder  - Continue citalopram 20 mg po daily at hs. Insomnia   - Continue trazodone 25 mg po daily at hs.         25.0 - 29.9 Overweight / Body mass index is 28.9 kg/m². Estimated discharge date: May 24  Barriers:    Code status: Full  Prophylaxis: SCDs  Recommended Disposition: TBD     Subjective:     Chief Complaint / Reason for Physician Visit  No complaints. Denies abdominal pain. Patient states that she did not have any melenal stools over noc. Plan of care and pertinent events reviewed with bedside nurse. Review of Systems:  Symptom Y/N Comments  Symptom Y/N Comments   Fever/Chills N   Chest Pain N    Poor Appetite N   Edema Y    Cough N   Abdominal Pain N    Sputum N   Joint Pain N    SOB/DAO N   Pruritis/Rash N    Nausea/vomit N   Tolerating PT/OT     Diarrhea N   Tolerating Diet Y    Constipation N   Other       Could NOT obtain due to:      Objective:     VITALS:   Last 24hrs VS reviewed since prior progress note. Most recent are:  Patient Vitals for the past 24 hrs:   Temp Pulse Resp BP SpO2   05/24/22 0431 98.1 °F (36.7 °C) (!) 52 16 (!) 93/46 97 %   05/24/22 0050 98.8 °F (37.1 °C) (!) 44 16 (!) 93/43 97 %   05/23/22 1936 98.1 °F (36.7 °C) 68 17 (!) 99/56 99 %   05/23/22 1526 98.3 °F (36.8 °C) (!) 51 17 (!) 136/59 99 %   05/23/22 1136 98 °F (36.7 °C) (!) 53 16 (!) 143/55 97 %   05/23/22 1000 -- (!) 48 23 (!) 141/36 94 %   05/23/22 0957 -- (!) 56 23 (!) 138/34 94 %   05/23/22 0954 -- (!) 48 21 -- 95 %   05/23/22 0950 -- (!) 48 21 -- 95 %   05/23/22 0948 -- (!) 47 24 (!) 128/33 95 %   05/23/22 0946 97.9 °F (36.6 °C) (!) 51 22 (!) 124/36 96 %   05/23/22 0940 -- (!) 50 17 (!) 129/36 95 %   05/23/22 0937 -- (!) 50 20 (!) 118/33 97 %   05/23/22 0827 97.7 °F (36.5 °C) (!) 52 20 (!) 152/43 96 %       Intake/Output Summary (Last 24 hours) at 5/24/2022 0736  Last data filed at 5/23/2022 1001  Gross per 24 hour   Intake 350 ml   Output 1200 ml   Net -850 ml        I had a face to face encounter and independently examined this patient on 5/24/2022, as outlined below:  PHYSICAL EXAM:  General:  A/A/O.  NAD. HEENT:  Normocephalic. Sclera anicteric. Mucous membranes moist.    Chest:  Resps even/unlabored with symmetrical CWE. Air entry full. Lungs CTA. No use of accessory muscles. CV:  RRR. Normal S1/S2. No M/C/R appreciated. No JVD. RUE (fracture side) with 3 mm pitting edema. 1-2 mm pitting pretibial edema noted mana. Cap refill < 3 sec. Peripheral pulses 1+. GI:  Abdomen soft/NT/ND. ABT X 4.    :  Voiding. Neurologic:  [place]. Face symmetrical.  Speech normal.     Psych:  Cooperative. No anxiety or agitation. No suicidal/homicidal ideation. Skin:  Warm and color appropriate. No rashes or jaundice. Turgor elastic. Reviewed most current lab test results and cultures  YES  Reviewed most current radiology test results   YES  Review and summation of old records today    NO  Reviewed patient's current orders and MAR    YES  PMH/SH reviewed - no change compared to H&P  ________________________________________________________________________  Care Plan discussed with:    Comments   Patient [address]     Consultant                        Multidiciplinary team rounds were held today with , nursing, pharmacist and clinical coordinator. Patient's plan of care was discussed; medications were reviewed and discharge planning was addressed. ________________________________________________________________________  Jodi Cunningham NP     Procedures: see electronic medical records for all procedures/Xrays and details which were not copied into this note but were reviewed prior to creation of Plan. LABS:  I reviewed today's most current labs and imaging studies.   Pertinent labs include:  Recent Labs     05/24/22  0352 05/23/22  0243 05/22/22  0511   WBC 3.3* 3.5* 4.5   HGB 6.8* 7.4* 8.2*   HCT 22.1* 24.1* 27.3*   PLT 80* 97* 106*     Recent Labs     05/24/22  0352 05/23/22  0243    142   K 3.3* 3.1*    106   CO2 29 32   * 76   BUN 29* 31*   CREA 1.50* 1.39*   CA 8.0* 8.2*       Signed: Jodi Cunningham NP

## 2022-05-24 NOTE — PROGRESS NOTES
Problem: Falls - Risk of  Goal: *Absence of Falls  Description: Document Reina Roque Fall Risk and appropriate interventions in the flowsheet. Outcome: Progressing Towards Goal  Note: Fall Risk Interventions:  Mobility Interventions: Bed/chair exit alarm,Communicate number of staff needed for ambulation/transfer         Medication Interventions: Patient to call before getting OOB,Utilize gait belt for transfers/ambulation    Elimination Interventions: Patient to call for help with toileting needs,Toileting schedule/hourly rounds    History of Falls Interventions: Utilize gait belt for transfer/ambulation         Problem: Patient Education: Go to Patient Education Activity  Goal: Patient/Family Education  Outcome: Progressing Towards Goal     Problem: Pressure Injury - Risk of  Goal: *Prevention of pressure injury  Description: Document Zachary Scale and appropriate interventions in the flowsheet.   Outcome: Progressing Towards Goal  Note: Pressure Injury Interventions:  Sensory Interventions: Minimize linen layers,Maintain/enhance activity level    Moisture Interventions: Limit adult briefs,Minimize layers    Activity Interventions: Increase time out of bed,Chair cushion    Mobility Interventions: PT/OT evaluation,Float heels    Nutrition Interventions: Offer support with meals,snacks and hydration    Friction and Shear Interventions: Lift team/patient mobility team,Minimize layers                Problem: Patient Education: Go to Patient Education Activity  Goal: Patient/Family Education  Outcome: Progressing Towards Goal     Problem: Patient Education: Go to Patient Education Activity  Goal: Patient/Family Education  Outcome: Progressing Towards Goal     Problem: Patient Education: Go to Patient Education Activity  Goal: Patient/Family Education  Outcome: Progressing Towards Goal

## 2022-05-24 NOTE — PROGRESS NOTES
Pt is now sustaining her heart rate in the 50's. 0600 dose of albumin was withheld by NP when I perfectserve the NP seeking clarity if the Albumin should be given or not. BP now is 127/94, P of 52.   Report endorsed to Carlos Sanchez

## 2022-05-24 NOTE — WOUND CARE
Wound care consult for the Left buttock redness / open wound that was present on admission. Pt. Is currently being treated for a GI bleed with Nausea and vomiting. Other co-morbidities include lower extremity edema and kidney JAE, HTN, Hyperlipid disease, and she broke her humerus during a ground level fall recently - this has created a mobility issue. Assessment: patient was assisted to a standing position with the RN and myself. The round red open spot is located on the medial part of the upper buttocks with surrounding blanchable erythema. Treatment: Cleansed with a gauze moistened with NS and then applied a foam dressing for now. Wound care orders written to include Venelex ointment to be used on the wound as well. Unable to obtain a photo of the wound due to having to support the patient as she stood up. Plan: Wound care orders written and discussed with nursing.    Gladis Casey RN, BSN, [employer]

## 2022-05-24 NOTE — PROGRESS NOTES
Physical Therapy  Attempting to see patient for PT this pm. Patient is currently receiving blood transfusion. Will defer therapy at this time and follow back tomorrow.   Thank you,  Channing Middleton, PT

## 2022-05-24 NOTE — PROGRESS NOTES
GI PROGRESS NOTE  Marylee Lis, ANGI  [phone] NP in-hospital cell phone M-F until 4:30  After 5pm or on weekends, please call  for physician on call    Dameon Hale :1940 SZB:[id number]   ATTG: Dr. Stone Cortes   PCP: Kasia Kowalski MD  Date/Time:  2022 1:54 PM     Primary GI: Dr. Kiera Haile    Reason for following: acute on chronic anemia, hemorrhagic gastritis    Assessment:   -Acute on chronic anemia  · Black stools and hemoccult +, however on PO iron  · H/o chronic anemia, last work up   · Intermittent N/V  -CKD  -Confusion, per patient's daughter  -R arm hematoma, s/p fall with broken arm, arm in sling     GI Hx  - EGD normal esophagus, small HH, normal duodenum. Severe diffuse hemorrhagic antral gastropathy with contact bleeding. Hemostatic clip placed x1 for continued oozing bleeding from bx site with hemostasis achieved. Bx.  - M2A normal with gastritis  - CLN normal  - EGD with hemorrhagic gastritis per note     Plan:   -Await pathology results  -Clears today, if no signs of bleed can advance as tolerated  -PPI 40mgBID + sucralfate QID x 2 weeks  -Avoid NSAIDs  -Supportive measure per primary team. Monitor for overt S&S of GI bleed  -Trend hemoglobin, goal hemoglobin >7, transfuse as clinically indicated  -Will see on request over the weekend and resume care on Monday. Please call GI with any further questions or concerns. Thank you! Plan discussed with Dr. Krish Jaime discussed with Dr. Trina Gardner  Subjective:   Discussed with RN events overnight, has had one small black bowel movement overnight. Currently receiving blood for continued drop in hemoglobin. Patient denies any abdominal pain. No nausea or vomiting.      Complaint Y/N Description   Abdominal Pain N    Hematemesis     Hematochezia     Melena Y    Constipation     Diarrhea     Dyspepsia     Dysphagia     Jaundiced     Nausea/vomiting N      Review of Systems:  Symptom Y/N Comments Symptom Y/N Comments   Fever/Chills    Chest Pain     Cough    Headaches     Sputum    Joint Pain     SOB/DAO    Pruritis/Rash     Tolerating Diet  CLD  Other       Could NOT obtain due to:      Objective:   VITALS:   Last 24hrs VS reviewed since prior progress note. Most recent are:  Visit Vitals  BP (!) 121/50   Pulse (!) 52   Temp 98.3 °F (36.8 °C)   Resp 16   Ht 5' 2\" (1.575 m)   Wt 71.7 kg (158 lb)   SpO2 97%   BMI 28.90 kg/m²     No intake or output data in the 24 hours ending 05/24/22 1354  PHYSICAL EXAM:  General: WD, WN. Alert, cooperative, no acute distress    HEENT: NC, Atraumatic. Anicteric sclerae. Lungs:  CTA Bilaterally. No Wheezing/Rhonchi/Rales. Heart:  Regular  rhythm,  No murmur (), No Rubs, No Gallops  Abdomen: Soft, Non distended, Non tender.  +Bowel sounds, no HSM  Extremities: No c/c/e  Neurologic:  Alert and oriented X 3. No acute neurological distress   Psych:   Good insight. Not anxious nor agitated. Lab and Radiology Data Reviewed: (see below)    Medications Reviewed: (see below)  PMH/SH reviewed - no change compared to H&P  ________________________________________________________________________  Total time spent with patient: 20 minutes ________________________________________________________________________  Care Plan discussed with:  Patient Y   Family     RN Y              Consultant:       Jp Peter NP     Procedures: see electronic medical records for all procedures/Xrays and details which were not copied into this note but were reviewed prior to creation of Plan. LABS:  Recent Labs     05/24/22  0352 05/23/22  0243   WBC 3.3* 3.5*   HGB 6.8* 7.4*   HCT 22.1* 24.1*   PLT 80* 97*     Recent Labs     05/24/22  0352 05/23/22  0243    142   K 3.3* 3.1*    106   CO2 29 32   BUN 29* 31*   CREA 1.50* 1.39*   * 76   CA 8.0* 8.2*     No results for input(s): AP, TBIL, TP, ALB, GLOB, GGT, AML, LPSE in the last 72 hours.     No lab exists for component: SGOT, GPT, AMYP, HLPSE  No results for input(s): INR, PTP, APTT, INREXT in the last 72 hours. No results for input(s): FE, TIBC, PSAT, FERR in the last 72 hours. Lab Results   Component Value Date/Time    Folate 11.4 08/10/2013 04:00 AM     No results for input(s): PH, PCO2, PO2 in the last 72 hours. No results for input(s): CPK, CKMB in the last 72 hours.     No lab exists for component: TROPONINI  Lab Results   Component Value Date/Time    Color YELLOW/STRAW 05/20/2022 03:19 PM    Appearance CLEAR 05/20/2022 03:19 PM    Specific gravity 1.018 05/20/2022 03:19 PM    pH (UA) 5.0 05/20/2022 03:19 PM    Protein Negative 05/20/2022 03:19 PM    Glucose Negative 05/20/2022 03:19 PM    Ketone Negative 05/20/2022 03:19 PM    Bilirubin Negative 05/20/2022 03:19 PM    Urobilinogen 0.2 05/20/2022 03:19 PM    Nitrites Negative 05/20/2022 03:19 PM    Leukocyte Esterase Negative 05/20/2022 03:19 PM    Epithelial cells FEW 05/20/2022 03:19 PM    Bacteria Negative 05/20/2022 03:19 PM    WBC 0-4 05/20/2022 03:19 PM    RBC 0-5 05/20/2022 03:19 PM       MEDICATIONS:  Current Facility-Administered Medications   Medication Dose Route Frequency    0.9% sodium chloride infusion 250 mL  250 mL IntraVENous PRN    balsam peru-castor oiL (VENELEX) ointment   Topical DAILY    sodium chloride (NS) flush 5-40 mL  5-40 mL IntraVENous Q8H    sodium chloride (NS) flush 5-40 mL  5-40 mL IntraVENous PRN    sucralfate (CARAFATE) tablet 1 g  1 g Oral AC&HS    pantoprazole (PROTONIX) tablet 40 mg  40 mg Oral ACB&D    folic acid (FOLVITE) tablet 1 mg  1 mg Oral DAILY    [Held by provider] carvediloL (COREG) tablet 3.125 mg  3.125 mg Oral BID WITH MEALS    traZODone (DESYREL) tablet 25 mg  25 mg Oral QHS    atorvastatin (LIPITOR) tablet 10 mg  10 mg Oral QHS    [Held by provider] pentoxifylline CR (TRENTAL) tablet 400 mg  400 mg Oral BID    citalopram (CELEXA) tablet 20 mg  20 mg Oral QPM    acetaminophen (TYLENOL) tablet 650 mg  650 mg Oral Q6H PRN    Or    acetaminophen (TYLENOL) suppository 650 mg  650 mg Rectal Q6H PRN    polyethylene glycol (MIRALAX) packet 17 g  17 g Oral DAILY PRN    ondansetron (ZOFRAN ODT) tablet 4 mg  4 mg Oral Q8H PRN    Or    ondansetron (ZOFRAN) injection 4 mg  4 mg IntraVENous Q6H PRN

## 2022-05-24 NOTE — PROGRESS NOTES
Received notification from bedside RN about patient with regards to: HR dropping in the 30-40's, asleep  VS: BP 93/43, HR 44, RR 16, O2 sat 97% on RA    Intervention given: Held Coreg, 5% Albumin 25 g IV x 1 dose, EKG ordered

## 2022-05-24 NOTE — PROGRESS NOTES
Attempting to see patient for OT this pm. Patient is currently receiving blood transfusion. Will defer therapy at this time and follow back tomorrow.   Thank you

## 2022-05-25 LAB
ABO + RH BLD: NORMAL
ANION GAP SERPL CALC-SCNC: 3 MMOL/L (ref 5–15)
BASOPHILS # BLD: 0 K/UL (ref 0–0.1)
BASOPHILS NFR BLD: 1 % (ref 0–1)
BLD PROD TYP BPU: NORMAL
BLOOD GROUP ANTIBODIES SERPL: NORMAL
BPU ID: NORMAL
BUN SERPL-MCNC: 25 MG/DL (ref 6–20)
BUN/CREAT SERPL: 18 (ref 12–20)
CALCIUM SERPL-MCNC: 8.3 MG/DL (ref 8.5–10.1)
CHLORIDE SERPL-SCNC: 110 MMOL/L (ref 97–108)
CO2 SERPL-SCNC: 28 MMOL/L (ref 21–32)
CREAT SERPL-MCNC: 1.42 MG/DL (ref 0.55–1.02)
CROSSMATCH RESULT,%XM: NORMAL
DIFFERENTIAL METHOD BLD: ABNORMAL
ECHO AV AREA PEAK VELOCITY: 1.7 CM2
ECHO AV AREA PEAK VELOCITY: 1.7 CM2
ECHO AV AREA PEAK VELOCITY: 1.8 CM2
ECHO AV AREA PEAK VELOCITY: 1.8 CM2
ECHO AV AREA VTI: 1.9 CM2
ECHO AV AREA/BSA VTI: 1.1 CM2/M2
ECHO AV CUSP MM: 2.1 CM
ECHO AV MEAN GRADIENT: 4 MMHG
ECHO AV MEAN VELOCITY: 1 M/S
ECHO AV PEAK GRADIENT: 8 MMHG
ECHO AV PEAK GRADIENT: 9 MMHG
ECHO AV PEAK VELOCITY: 1.4 M/S
ECHO AV PEAK VELOCITY: 1.5 M/S
ECHO AV VTI: 39.9 CM
ECHO EST RA PRESSURE: 3 MMHG
ECHO LA VOL 2C: 61 ML (ref 22–52)
ECHO LA VOL 4C: 81 ML (ref 22–52)
ECHO LA VOL BP: 76 ML (ref 22–52)
ECHO LA VOL/BSA BIPLANE: 44 ML/M2 (ref 16–34)
ECHO LA VOLUME AREA LENGTH: 84 ML
ECHO LA VOLUME INDEX A2C: 35 ML/M2 (ref 16–34)
ECHO LA VOLUME INDEX A4C: 47 ML/M2 (ref 16–34)
ECHO LA VOLUME INDEX AREA LENGTH: 49 ML/M2 (ref 16–34)
ECHO LV E' LATERAL VELOCITY: 12 CM/S
ECHO LV E' SEPTAL VELOCITY: 6 CM/S
ECHO LVOT AREA: 2 CM2
ECHO LVOT AV VTI INDEX: 0.9
ECHO LVOT DIAM: 1.6 CM
ECHO LVOT MEAN GRADIENT: 3 MMHG
ECHO LVOT PEAK GRADIENT: 6 MMHG
ECHO LVOT PEAK GRADIENT: 6 MMHG
ECHO LVOT PEAK VELOCITY: 1.2 M/S
ECHO LVOT PEAK VELOCITY: 1.3 M/S
ECHO LVOT STROKE VOLUME INDEX: 41.6 ML/M2
ECHO LVOT SV: 71.9 ML
ECHO LVOT VTI: 35.8 CM
ECHO MV A VELOCITY: 0.77 M/S
ECHO MV E DECELERATION TIME (DT): 95.9 MS
ECHO MV E VELOCITY: 1.02 M/S
ECHO MV E/A RATIO: 1.32
ECHO MV E/E' LATERAL: 8.5
ECHO MV E/E' RATIO (AVERAGED): 12.75
ECHO MV E/E' SEPTAL: 17
ECHO MV REGURGITANT PEAK GRADIENT: 85 MMHG
ECHO MV REGURGITANT PEAK VELOCITY: 4.6 M/S
ECHO PV MAX VELOCITY: 1 M/S
ECHO PV PEAK GRADIENT: 4 MMHG
ECHO RIGHT VENTRICULAR SYSTOLIC PRESSURE (RVSP): 36 MMHG
ECHO RV FREE WALL PEAK S': 12 CM/S
ECHO RV INTERNAL DIMENSION: 4 CM
ECHO RV TAPSE: 2.4 CM (ref 1.7–?)
ECHO TV REGURGITANT MAX VELOCITY: 2.87 M/S
ECHO TV REGURGITANT PEAK GRADIENT: 33 MMHG
EOSINOPHIL # BLD: 0.2 K/UL (ref 0–0.4)
EOSINOPHIL NFR BLD: 5 % (ref 0–7)
ERYTHROCYTE [DISTWIDTH] IN BLOOD BY AUTOMATED COUNT: 18.9 % (ref 11.5–14.5)
GLUCOSE SERPL-MCNC: 89 MG/DL (ref 65–100)
HCT VFR BLD AUTO: 29.1 % (ref 35–47)
HGB BLD-MCNC: 9.1 G/DL (ref 11.5–16)
IMM GRANULOCYTES # BLD AUTO: 0 K/UL (ref 0–0.04)
IMM GRANULOCYTES NFR BLD AUTO: 0 % (ref 0–0.5)
LYMPHOCYTES # BLD: 1 K/UL (ref 0.8–3.5)
LYMPHOCYTES NFR BLD: 29 % (ref 12–49)
MCH RBC QN AUTO: 29 PG (ref 26–34)
MCHC RBC AUTO-ENTMCNC: 31.3 G/DL (ref 30–36.5)
MCV RBC AUTO: 92.7 FL (ref 80–99)
MONOCYTES # BLD: 0.5 K/UL (ref 0–1)
MONOCYTES NFR BLD: 14 % (ref 5–13)
NEUTS SEG # BLD: 1.7 K/UL (ref 1.8–8)
NEUTS SEG NFR BLD: 51 % (ref 32–75)
NRBC # BLD: 0 K/UL (ref 0–0.01)
NRBC BLD-RTO: 0 PER 100 WBC
PLATELET # BLD AUTO: 85 K/UL (ref 150–400)
PMV BLD AUTO: 11.2 FL (ref 8.9–12.9)
POTASSIUM SERPL-SCNC: 4 MMOL/L (ref 3.5–5.1)
RBC # BLD AUTO: 3.14 M/UL (ref 3.8–5.2)
SODIUM SERPL-SCNC: 141 MMOL/L (ref 136–145)
SPECIMEN EXP DATE BLD: NORMAL
STATUS OF UNIT,%ST: NORMAL
UNIT DIVISION, %UDIV: 0
WBC # BLD AUTO: 3.4 K/UL (ref 3.6–11)

## 2022-05-25 PROCEDURE — 80048 BASIC METABOLIC PNL TOTAL CA: CPT

## 2022-05-25 PROCEDURE — 65270000029 HC RM PRIVATE

## 2022-05-25 PROCEDURE — 74011250637 HC RX REV CODE- 250/637: Performed by: STUDENT IN AN ORGANIZED HEALTH CARE EDUCATION/TRAINING PROGRAM

## 2022-05-25 PROCEDURE — 85025 COMPLETE CBC W/AUTO DIFF WBC: CPT

## 2022-05-25 PROCEDURE — 74011250637 HC RX REV CODE- 250/637: Performed by: INTERNAL MEDICINE

## 2022-05-25 PROCEDURE — 74011000250 HC RX REV CODE- 250: Performed by: INTERNAL MEDICINE

## 2022-05-25 PROCEDURE — 97530 THERAPEUTIC ACTIVITIES: CPT

## 2022-05-25 RX ADMIN — SODIUM CHLORIDE, PRESERVATIVE FREE 10 ML: 5 INJECTION INTRAVENOUS at 06:00

## 2022-05-25 RX ADMIN — PANTOPRAZOLE SODIUM 40 MG: 40 TABLET, DELAYED RELEASE ORAL at 08:59

## 2022-05-25 RX ADMIN — SODIUM CHLORIDE, PRESERVATIVE FREE 10 ML: 5 INJECTION INTRAVENOUS at 14:38

## 2022-05-25 RX ADMIN — PANTOPRAZOLE SODIUM 40 MG: 40 TABLET, DELAYED RELEASE ORAL at 18:01

## 2022-05-25 RX ADMIN — TRAZODONE HYDROCHLORIDE 25 MG: 50 TABLET ORAL at 21:14

## 2022-05-25 RX ADMIN — FOLIC ACID 1 MG: 1 TABLET ORAL at 08:59

## 2022-05-25 RX ADMIN — SUCRALFATE 1 G: 1 TABLET ORAL at 21:14

## 2022-05-25 RX ADMIN — ATORVASTATIN CALCIUM 10 MG: 10 TABLET, FILM COATED ORAL at 21:14

## 2022-05-25 RX ADMIN — ACETAMINOPHEN 650 MG: 325 TABLET ORAL at 18:13

## 2022-05-25 RX ADMIN — CASTOR OIL AND BALSAM, PERU: 788; 87 OINTMENT TOPICAL at 09:01

## 2022-05-25 RX ADMIN — SODIUM CHLORIDE, PRESERVATIVE FREE 10 ML: 5 INJECTION INTRAVENOUS at 21:16

## 2022-05-25 RX ADMIN — CITALOPRAM HYDROBROMIDE 20 MG: 20 TABLET ORAL at 18:01

## 2022-05-25 RX ADMIN — SUCRALFATE 1 G: 1 TABLET ORAL at 08:59

## 2022-05-25 RX ADMIN — SUCRALFATE 1 G: 1 TABLET ORAL at 18:01

## 2022-05-25 RX ADMIN — SUCRALFATE 1 G: 1 TABLET ORAL at 11:08

## 2022-05-25 RX ADMIN — ACETAMINOPHEN 650 MG: 325 TABLET ORAL at 11:08

## 2022-05-25 NOTE — PROGRESS NOTES
Problem: Mobility Impaired (Adult and Pediatric)  Goal: *Acute Goals and Plan of Care (Insert Text)  Description: FUNCTIONAL STATUS PRIOR TO ADMISSION: Patient fell 5/11/2022 and fractured her right humerus, she has had limited mobility since and has required assist from daughter for ADL/mobility (although daughter works during the day out of the home)    [facility]: The patient lived with daughter/son-in-law and 2 granddaughters and required moderate assistance  for ADL, minimal assist for mobility since her fall. Patient states that ramp will be built for home entry in the near future. Physical Therapy Goals  Initiated 5/21/2022  1. Patient will move from supine to sit and sit to supine  in bed with minimal assistance/contact guard assist within 7 day(s). 2.  Patient will transfer from bed to chair and chair to bed with minimal assistance/contact guard assist using the least restrictive device within 7 day(s). 3.  Patient will perform sit to stand with minimal assistance/contact guard assist within 7 day(s). 4.  Patient will ambulate with minimal assistance/contact guard assist for 50 feet with the least restrictive device within 7 day(s). 5.  Patient will ascend/descend 4 stairs with left handrail(s) with minimal assistance/contact guard assist within 7 day(s). Outcome: Progressing Towards Goal   PHYSICAL THERAPY TREATMENT  Patient: Tiara Hung (20 y.o. female)  Date: 5/25/2022  Diagnosis: Acute GI bleeding [K92.2]  Symptomatic anemia [D64.9] <principal problem not specified>  Procedure(s) (LRB):  ESOPHAGOGASTRODUODENOSCOPY (EGD) (N/A)  ESOPHAGOGASTRODUODENAL (EGD) BIOPSY (N/A)  RESOLUTION CLIP (N/A) 2 Days Post-Op  Precautions: NWB (right UE)  Chart, physical therapy assessment, plan of care and goals were reviewed. ASSESSMENT  Patient continues with skilled PT services and is progressing towards goals.  Patient demonstrates the stable BP with positional changes, see flowsheet. HOB is elevated and she transition supine to sit with VC and moderate assistance. Patient demonstrates good sitting balance and is able to tolerate well. She performs sit to stand with Mod A and stand pivot to the R reaching for arm of chair. She is left seated upright with all needs met. Current Level of Function Impacting Discharge (mobility/balance): Mod A     Other factors to consider for discharge: medical stability, decreased balance, Increased need for assistance, fear of falling         PLAN :  Patient continues to benefit from skilled intervention to address the above impairments. Continue treatment per established plan of care. to address goals. Recommendation for discharge: (in order for the patient to meet his/her long term goals)  Therapy up to 5 days/week in SNF setting    This discharge recommendation:  Has been made in collaboration with the attending provider and/or case management    IF patient discharges home will need the following DME: to be determined (TBD)       SUBJECTIVE:   Patient stated I can't do it on my own.     OBJECTIVE DATA SUMMARY:   Critical Behavior:  Neurologic State: Alert  Orientation Level: Oriented X4  Cognition: Follows commands  Safety/Judgement: Fall prevention  Functional Mobility Training:  Bed Mobility:     Supine to Sit: Moderate assistance;Bed Modified     Scooting: Minimum assistance        Transfers:  Sit to Stand: Moderate assistance  Stand to Sit: Moderate assistance        Bed to Chair: Moderate assistance                    Balance:  Sitting: Intact  Standing: Impaired; Without support  Standing - Static: Constant support; Fair  Standing - Dynamic : Constant support; Fair  Ambulation/Gait Training:                                                        Stairs:               Therapeutic Exercises:     Pain Rating:      Activity Tolerance:   Good    After treatment patient left in no apparent distress:   Sitting in chair, Call bell within reach, and Bed / chair alarm activated    COMMUNICATION/COLLABORATION:   The patients plan of care was discussed with: Registered nurse.      Marilee Bradford PT   Time Calculation: 28 mins

## 2022-05-25 NOTE — PROGRESS NOTES
Hospitalist Progress Note    NAME: Ponce Greco   :  1940   MRN:  [id number]       Assessment / Plan:  Acute upper GI bleed POA  Hemorrhagic antral gastropathy POA  GERD POA  Acute blood loss anemia on chronic anemia (iron deficiency) POA  Thrombocytopenia   CT abdomen/pelvis 22:  1. No acute intra-abdominal pathology. No evidence of bowel obstruction. 2.   Shrunken liver with questionable nodular contour. Question history of cirrhosis. Abdominal pelvic ascites is noted with splenomegaly. 3.  Cholecystectomy. Common bile duct is mildly dilated which may be physiologic  given the patient's age. Recommend correlation with serum alkaline phosphatase. 4.  Nonobstructing right renal stone. EGD 22:  1. Normal proximal and mid esophagus  2. Small, sliding hiatal hernia  3. Severe, diffuse, hemorrhagic antral gastropathy with contact bleeding. Biopsied. Hemostatic clip placed x 1 for continued oozing bleeding from biopsy site with hemostasis achieved. 4.  Stomach otherwise normal, including retroflexion  5. Normal duodenal bulb and 2nd/3rd portion of the duodenum  - GI input appreciated. - Patient received 2 unit PRBCs, total, last 2022   - Transfuse for Hgb <7.0  - Hgb now 8.5 to 9.1  - Continue pantoprazole 40 mg IV q12h. - Continue sucralfate 1 gm po qid, will need for 2 weeks. - Monitor blood count q12h. - start discharge planning    Dominic LE edema  Hypoalbuminemia  CXR 22:  No acute cardiopulmonary disease. Right humeral fracture. - Patient had echo completed in 2021, family to provide report however have not - obtain echo. - Hypoalbuminemia likely contributing to edema.  - Patient received dose of IV furosemide after first unit of blood. - Since edema dominic without unilateral difference will hold on venous dopplers.     - Patient received albumin for 8 doses and additional dose over noc last for hypotension.     Suspected chronic kidney disease POA  - Slight decline in renal function today, likely pre-renal.  - Avoid nephrotoxins.   - Adjust meds based on CrCl. - Monitor. Hypokalemia   - Supplement. - Monitor. Recent right humerus fraction   - CT head 05/20/22: Moderate chronic small vessel ischemic disease. No acute intracranial abnormality.  - RUE edematous. - Maintain sling.  - Elevate as able. - OP follow up with ortho on 06/03/22, per ortho patient not good candidate for surgical intervention. HTN with hypotensive episode   Dyslipidemia   PAD  - Continue carvedilol 3.125 mg po bid. - Continue atorvastatin 10 mg po daily.   - Hold pentoxifylline 2/2 GIB. Depression   Anxiety disorder  - Continue citalopram 20 mg po daily at hs. Insomnia   - Continue trazodone 25 mg po daily at hs.         25.0 - 29.9 Overweight / Body mass index is 28.91 kg/m². Code status: Full  Prophylaxis: SCDs  Recommended Disposition: TBD     Subjective:     Chief Complaint / Reason for Physician Visit  No complaints. \"no more bleeding\"  Denies abdominal pain. Main complaint is pain in the right arm  Plan of care and pertinent events reviewed with bedside nurse. Review of Systems:  Symptom Y/N Comments  Symptom Y/N Comments   Fever/Chills N   Chest Pain N    Poor Appetite    Edema n    Cough N   Abdominal Pain N    Sputum    Joint Pain y Right arm   SOB/DAO N   Pruritis/Rash     Nausea/vomit N   Tolerating PT/OT     Diarrhea N   Tolerating Diet Y    Constipation    Other       Could NOT obtain due to:      Objective:     VITALS:   Last 24hrs VS reviewed since prior progress note.  Most recent are:  Patient Vitals for the past 24 hrs:   Temp Pulse Resp BP SpO2   05/25/22 1116 -- (!) 50 -- (!) 160/69 --   05/25/22 1100 -- (!) 54 -- (!) 171/76 --   05/25/22 0900 -- (!) 51 -- (!) 156/62 --   05/25/22 0827 98 °F (36.7 °C) (!) 59 17 135/85 96 %   05/25/22 0433 98 °F (36.7 °C) (!) 58 16 (!) 136/43 98 %   05/25/22 0012 98.3 °F (36.8 °C) 86 18 (!) 136/50 97 %   05/24/22 2157 -- -- -- (!) 127/49 --   05/24/22 2045 97.9 °F (36.6 °C) 68 18 (!) 134/94 98 %   05/24/22 1548 -- -- -- (!) 140/50 --       Intake/Output Summary (Last 24 hours) at 5/25/2022 1513  Last data filed at 5/25/2022 9449  Gross per 24 hour   Intake --   Output 500 ml   Net -500 ml        I had a face to face encounter and independently examined this patient on 5/25/2022, as outlined below:  PHYSICAL EXAM:  General:  A/A/O.  NAD. HEENT:  Normocephalic. Sclera anicteric. Mucous membranes moist.    Chest:  Resps even/unlabored with symmetrical CWE. Air entry full. Lungs CTA. No use of accessory muscles. CV:  RRR. Normal S1/S2. No M/C/R appreciated. No JVD. RUE (fracture side) with large pitting edema. 1-2 mm pitting pretibial edema noted mana. Cap refill < 3 sec. Peripheral pulses 1+. GI:  Abdomen soft/NT/ND. ABT X 4.    :  Voiding. Neurologic:  [place]. Face symmetrical.  Speech normal.     Psych:  Cooperative. No anxiety or agitation. No suicidal/homicidal ideation. Skin:  Warm and color appropriate. No rashes or jaundice. Turgor elastic. Reviewed most current lab test results and cultures  YES  Reviewed most current radiology test results   YES  Review and summation of old records today    NO  Reviewed patient's current orders and MAR    YES  PMH/SH reviewed - no change compared to H&P  ________________________________________________________________________  Care Plan discussed with:    Comments   Patient [address] [place] [address]     Consultant                        Multidiciplinary team rounds were held today with , nursing, pharmacist and clinical coordinator. Patient's plan of care was discussed; medications were reviewed and discharge planning was addressed.      ________________________________________________________________________  Pearl Dc, MD     Procedures: see electronic medical records for all procedures/Xrays and details which were not copied into this note but were reviewed prior to creation of Plan. LABS:  I reviewed today's most current labs and imaging studies. Pertinent labs include:  Recent Labs     05/25/22  0531 05/24/22  1713 05/24/22 0352 05/23/22 0243 05/23/22 0243   WBC 3.4*  --  3.3*  --  3.5*   HGB 9.1* 8.5* 6.8*   < > 7.4*   HCT 29.1* 27.2* 22.1*   < > 24.1*   PLT 85*  --  80*  --  97*    < > = values in this interval not displayed.      Recent Labs     05/25/22  0531 05/24/22 0352 05/23/22 0243    142 142   K 4.0 3.3* 3.1*   * 108 106   CO2 28 29 32   GLU 89 155* 76   BUN 25* 29* 31*   CREA 1.42* 1.50* 1.39*   CA 8.3* 8.0* 8.2*       Signed: Sedrick Donis MD

## 2022-05-25 NOTE — PROGRESS NOTES
End of Shift Note    Bedside shift change report given to Cale Manzanares (oncoming nurse) by David Bill RN (offgoing nurse). Report included the following information SBAR, Kardex, ED Summary, OR Summary, Intake/Output, MAR and Recent Results    Shift worked:  7p-7a     Shift summary and any significant changes:     Patient rested this shift  Labs drawn     Concerns for physician to address:  Renew cardiac monitor? Zone phone for oncoming shift:   [phone]       Activity:  Activity Level: Up with Assistance  Number times ambulated in hallways past shift: 0  Number of times OOB to chair past shift: 0    Cardiac:   Cardiac Rhythm: Sinus Katharine Escort    Access:   Current line(s): PIV     Genitourinary:   Urinary status: voiding    Respiratory:   O2 Device: None (Room air)  Chronic home O2 use?: NO    GI:  Last Bowel Movement Date: 05/24/22  Current diet:  ADULT DIET Clear Liquid  Passing flatus: NO  Tolerating current diet: NO       Pain Management:   Patient states pain is manageable on current regimen: YES    Skin:  Zachary Score: 14  Interventions: speciality bed, float heels, increase time out of bed and foam dressing    Patient Safety:  Fall Score:  Total Score: 2  Interventions: assistive device (walker, cane, etc), gripper socks and pt to call before getting OOB  High Fall Risk: Yes    Length of Stay:  Expected LOS: - - -  Actual LOS: 5      David Bill RN

## 2022-05-25 NOTE — PROGRESS NOTES
Spiritual Care Assessment/Progress Note  [facility]      NAME: Tank Calero      MRN: [id number]  AGE: 78 y.o.  SEX: female  Anabaptism Affiliation: Yarsanism   Language: English     5/25/2022     Total Time (in minutes): 15     Spiritual Assessment begun in [facility] 3 SURG TELE through conversation with:         [x]Patient        [] Family    [] Friend(s)        Reason for Consult: Initial/Spiritual assessment, patient floor     Spiritual beliefs: (Please include comment if needed)     [x] Identifies with a esme tradition:  Yarsanism       [x] Supported [facility] a esme [facility]:   [facility]         [] Claims no spiritual orientation:           [] Seeking spiritual identity:                [] Adheres to an individual form of spirituality:           [] Not able to assess:                           Identified resources for coping:      [x] Prayer                               [] Music                  [] Guided Imagery     [x] Family/friends                 [] Pet visits     [] Devotional reading                         [] Unknown     [] Other:                                           Interventions offered during this visit: (See comments for more details)    Patient Interventions: Affirmation of emotions/emotional suffering,Affirmation of esme,Catharsis/review of pertinent events in supportive environment,Coping skills reviewed/reinforced,Iconic (affirming the presence of God/Higher Power),Initial/Spiritual assessment, patient floor,Normalization of emotional/spiritual concerns,Prayer (actual),Anabaptism beliefs/image of God discussed           Plan of Care:     [x] Support spiritual and/or cultural needs    [] Support AMD and/or advance care planning process      [] Support grieving process   [] Coordinate Rites and/or Rituals    [] Coordination with community clergy   [x] No spiritual needs identified at this time   [] Detailed Plan of Care below (See Comments)  [] Make referral to Music Therapy  [] Make referral to Pet Therapy     [] Make referral to Addiction services  [] Make referral to [facility]  [] Make referral to Spiritual Care Partner  [] No future visits requested        [x] Contact Spiritual Care for further referrals     Comments:  Reviewed chart prior to visit on Surg Tele for spiritual assessment. No family/friends present. Patient shared her daughter is working today. She shared about her current medical challenges and hope for healing. She is a member of [employer]. Her esme is important and prayer is a source of coping. Provided supportive listening presence and offered prayer at her request.  Advised her of ongoing availability of pastoral support. She expressed appreciation for the visit.    available upon referral by nurse or by patient request.       JANNETTE Roberts, [facility], Staff [address]    [address] Paging Service  [phone]

## 2022-05-25 NOTE — PROGRESS NOTES
End of Shift Note    Bedside shift change report given to Maxwell Chung RN (oncoming nurse) by Rodrigue Castillo LPN (offgoing nurse). Report included the following information SBAR, Kardex and MAR    Shift worked:  7a-7p     Shift summary and any significant changes:    Patient tolerating diet well. Uneventful shift. Concerns for physician to address: none   Zone phone for oncoming shift:  [phone]     Activity:  Activity Level: Up with Assistance  Number times ambulated in hallways past shift: 0  Number of times OOB to chair past shift: 1    Cardiac:   Cardiac Monitoring: Yes      Cardiac Rhythm: Sinus Javy    Access:   Current line(s): PIV     Genitourinary:   Urinary status: voiding    Respiratory:   O2 Device: None (Room air)  Chronic home O2 use?: NO  Incentive spirometer at bedside: NO       GI:  Last Bowel Movement Date: 05/24/22  Current diet:  ADULT ORAL NUTRITION SUPPLEMENT Breakfast, Lunch, Dinner; Clear Liquid  ADULT DIET Full Liquid  Passing flatus: YES  Tolerating current diet: YES       Pain Management:   Patient states pain is manageable on current regimen: YES    Skin:  Zachary Score: 16  Interventions: increase time out of bed, PT/OT consult and nutritional support     Patient Safety:  Fall Score:  Total Score: 4  Interventions: gripper socks, pt to call before getting OOB and stay with me (per policy)  High Fall Risk: Yes    Length of Stay:  Expected LOS: 3d 0h  Actual LOS: 5      Rodrigue Castillo LPN

## 2022-05-25 NOTE — PROGRESS NOTES
Comprehensive Nutrition Assessment    Type and Reason for Visit: Initial,Positive nutrition screen    Nutrition Recommendations/Plan:   1. Advance diet as medically able   2. RD to add Ensure Clear TID     Malnutrition Assessment:  Malnutrition Status:     UTD at this time    Nutrition Assessment:  Pt admitted with acute GI bleed. PMH: HTN, GERD. Chart reviewed, pt on bed side commode at time of attempted visit. MST triggered for unsure of wt loss. EMR reviewed, wt appears to be stable (current wt is via standing scale). GI is following, 'advance diet as tolerated as long as there is no bleeding'. Appetite appears to be good per EMR. Will add ONS while pt on clear liquids. Will need to conduct interview and NFPE upon F/U to determine nutritional status. Patient Vitals for the past 168 hrs:   % Diet Eaten   05/22/22 0900 76 - 100%   05/21/22 1156 51 - 75%     Wt Readings from Last 15 Encounters:   05/24/22 71.7 kg (158 lb 1.1 oz)   09/28/21 71.7 kg (158 lb)   09/18/21 71.7 kg (158 lb)   10/14/20 73 kg (161 lb)   10/15/20 73 kg (160 lb 15 oz)   09/01/20 73 kg (161 lb)   08/18/20 72.6 kg (160 lb)   05/15/18 70.3 kg (155 lb)   11/21/17 71.8 kg (158 lb 6 oz)   06/25/15 77.1 kg (170 lb)   03/02/15 77.9 kg (171 lb 11.8 oz)   11/12/13 77.8 kg (171 lb 9 oz)   11/08/13 77 kg (169 lb 12.1 oz)   10/09/13 76.1 kg (167 lb 12.3 oz)   08/12/13 82.1 kg (181 lb)          Nutrition Related Findings:    Meds: folvite, protonix, carafate. BM 5/24 Wound Type: Skin tears,Moisture associate skin damage    Current Nutrition Intake & Therapies:  Average Meal Intake: %  Average Supplement Intake: None ordered  ADULT DIET Clear Liquid  ADULT ORAL NUTRITION SUPPLEMENT Breakfast, Lunch, Dinner; Clear Liquid    Anthropometric Measures:  Height: 5' 2\" (157.5 cm)  Ideal Body Weight (IBW): 110 lbs (50 kg)     Current Body Wt:  71.7 kg (158 lb 1.1 oz), 143.7 % IBW.  Standing scale  Current BMI (kg/m2): 28.9  Usual Body Weight: 71.7 kg (158 lb)  % Weight Change (Calculated): 0                    BMI Category: Overweight (BMI 25.0-29. 9)    Estimated Daily Nutrient Needs:  Energy Requirements Based On: Formula  Weight Used for Energy Requirements: Current  Energy (kcal/day): MSJ 1500 (1126 x 1.3)  Weight Used for Protein Requirements: Current  Protein (g/day): 72g (1gPro/kg)  Method Used for Fluid Requirements: 1 ml/kcal  Fluid (ml/day): 1500mL    Nutrition Diagnosis:   No nutrition diagnosis at this time    Nutrition Interventions:   Food and/or Nutrient Delivery: Continue current diet,Modify current diet,Start oral nutrition supplement  Nutrition Education/Counseling: No recommendations at this time  Coordination of Nutrition Care: Continue to monitor while inpatient       Goals:     Goals: PO intake 50% or greater,by next RD assessment (and advance to solid diet in 3-5 days.)       Nutrition Monitoring and Evaluation:      Food/Nutrient Intake Outcomes: Diet advancement/tolerance,Food and nutrient intake,Supplement intake  Physical Signs/Symptoms Outcomes: Biochemical data,Nutrition focused physical findings,Skin,Weight,GI status    Discharge Planning:     Too soon to determine    Nik Marina RD, CNSC  Contact: ext [phone]

## 2022-05-26 ENCOUNTER — APPOINTMENT (OUTPATIENT)
Dept: VASCULAR SURGERY | Age: 82
DRG: 378 | End: 2022-05-26
Attending: INTERNAL MEDICINE
Payer: MEDICARE

## 2022-05-26 LAB
ANION GAP SERPL CALC-SCNC: 4 MMOL/L (ref 5–15)
APTT PPP: 29.6 SEC (ref 22.1–31)
BASOPHILS # BLD: 0 K/UL (ref 0–0.1)
BASOPHILS # BLD: 0 K/UL (ref 0–0.1)
BASOPHILS NFR BLD: 1 % (ref 0–1)
BASOPHILS NFR BLD: 1 % (ref 0–1)
BUN SERPL-MCNC: 22 MG/DL (ref 6–20)
BUN/CREAT SERPL: 16 (ref 12–20)
CALCIUM SERPL-MCNC: 8.1 MG/DL (ref 8.5–10.1)
CHLORIDE SERPL-SCNC: 112 MMOL/L (ref 97–108)
CO2 SERPL-SCNC: 27 MMOL/L (ref 21–32)
CREAT SERPL-MCNC: 1.37 MG/DL (ref 0.55–1.02)
DIFFERENTIAL METHOD BLD: ABNORMAL
DIFFERENTIAL METHOD BLD: ABNORMAL
EOSINOPHIL # BLD: 0.2 K/UL (ref 0–0.4)
EOSINOPHIL # BLD: 0.2 K/UL (ref 0–0.4)
EOSINOPHIL NFR BLD: 5 % (ref 0–7)
EOSINOPHIL NFR BLD: 6 % (ref 0–7)
ERYTHROCYTE [DISTWIDTH] IN BLOOD BY AUTOMATED COUNT: 18.9 % (ref 11.5–14.5)
ERYTHROCYTE [DISTWIDTH] IN BLOOD BY AUTOMATED COUNT: 19.1 % (ref 11.5–14.5)
FIBRINOGEN PPP-MCNC: 102 MG/DL (ref 200–475)
GLUCOSE SERPL-MCNC: 86 MG/DL (ref 65–100)
HCT VFR BLD AUTO: 29.8 % (ref 35–47)
HCT VFR BLD AUTO: 31.2 % (ref 35–47)
HGB BLD-MCNC: 9.3 G/DL (ref 11.5–16)
HGB BLD-MCNC: 9.5 G/DL (ref 11.5–16)
IMM GRANULOCYTES # BLD AUTO: 0 K/UL (ref 0–0.04)
IMM GRANULOCYTES # BLD AUTO: 0 K/UL (ref 0–0.04)
IMM GRANULOCYTES NFR BLD AUTO: 1 % (ref 0–0.5)
IMM GRANULOCYTES NFR BLD AUTO: 1 % (ref 0–0.5)
INR PPP: 1.6 (ref 0.9–1.1)
LYMPHOCYTES # BLD: 0.8 K/UL (ref 0.8–3.5)
LYMPHOCYTES # BLD: 0.9 K/UL (ref 0.8–3.5)
LYMPHOCYTES NFR BLD: 20 % (ref 12–49)
LYMPHOCYTES NFR BLD: 25 % (ref 12–49)
MCH RBC QN AUTO: 29 PG (ref 26–34)
MCH RBC QN AUTO: 29.2 PG (ref 26–34)
MCHC RBC AUTO-ENTMCNC: 30.4 G/DL (ref 30–36.5)
MCHC RBC AUTO-ENTMCNC: 31.2 G/DL (ref 30–36.5)
MCV RBC AUTO: 93.7 FL (ref 80–99)
MCV RBC AUTO: 95.1 FL (ref 80–99)
MONOCYTES # BLD: 0.3 K/UL (ref 0–1)
MONOCYTES # BLD: 0.6 K/UL (ref 0–1)
MONOCYTES NFR BLD: 10 % (ref 5–13)
MONOCYTES NFR BLD: 13 % (ref 5–13)
NEUTS SEG # BLD: 1.8 K/UL (ref 1.8–8)
NEUTS SEG # BLD: 2.7 K/UL (ref 1.8–8)
NEUTS SEG NFR BLD: 57 % (ref 32–75)
NEUTS SEG NFR BLD: 60 % (ref 32–75)
NRBC # BLD: 0 K/UL (ref 0–0.01)
NRBC # BLD: 0 K/UL (ref 0–0.01)
NRBC BLD-RTO: 0 PER 100 WBC
NRBC BLD-RTO: 0 PER 100 WBC
PLATELET # BLD AUTO: 49 K/UL (ref 150–400)
PLATELET # BLD AUTO: 88 K/UL (ref 150–400)
PMV BLD AUTO: 11.3 FL (ref 8.9–12.9)
PMV BLD AUTO: 11.9 FL (ref 8.9–12.9)
POTASSIUM SERPL-SCNC: 4 MMOL/L (ref 3.5–5.1)
PROTHROMBIN TIME: 15.8 SEC (ref 9–11.1)
RBC # BLD AUTO: 3.18 M/UL (ref 3.8–5.2)
RBC # BLD AUTO: 3.28 M/UL (ref 3.8–5.2)
RBC MORPH BLD: ABNORMAL
RBC MORPH BLD: ABNORMAL
SODIUM SERPL-SCNC: 143 MMOL/L (ref 136–145)
THERAPEUTIC RANGE,PTTT: NORMAL SECS (ref 58–77)
WBC # BLD AUTO: 3.1 K/UL (ref 3.6–11)
WBC # BLD AUTO: 4.4 K/UL (ref 3.6–11)

## 2022-05-26 PROCEDURE — 93971 EXTREMITY STUDY: CPT

## 2022-05-26 PROCEDURE — 74011250637 HC RX REV CODE- 250/637: Performed by: INTERNAL MEDICINE

## 2022-05-26 PROCEDURE — 80048 BASIC METABOLIC PNL TOTAL CA: CPT

## 2022-05-26 PROCEDURE — 85025 COMPLETE CBC W/AUTO DIFF WBC: CPT

## 2022-05-26 PROCEDURE — 85610 PROTHROMBIN TIME: CPT

## 2022-05-26 PROCEDURE — 65270000029 HC RM PRIVATE

## 2022-05-26 PROCEDURE — 85384 FIBRINOGEN ACTIVITY: CPT

## 2022-05-26 PROCEDURE — 74011250637 HC RX REV CODE- 250/637: Performed by: STUDENT IN AN ORGANIZED HEALTH CARE EDUCATION/TRAINING PROGRAM

## 2022-05-26 PROCEDURE — 97530 THERAPEUTIC ACTIVITIES: CPT

## 2022-05-26 PROCEDURE — 85730 THROMBOPLASTIN TIME PARTIAL: CPT

## 2022-05-26 PROCEDURE — 36415 COLL VENOUS BLD VENIPUNCTURE: CPT

## 2022-05-26 PROCEDURE — 97535 SELF CARE MNGMENT TRAINING: CPT

## 2022-05-26 PROCEDURE — 97530 THERAPEUTIC ACTIVITIES: CPT | Performed by: PHYSICAL THERAPIST

## 2022-05-26 RX ADMIN — SUCRALFATE 1 G: 1 TABLET ORAL at 11:32

## 2022-05-26 RX ADMIN — TRAZODONE HYDROCHLORIDE 25 MG: 50 TABLET ORAL at 22:22

## 2022-05-26 RX ADMIN — CITALOPRAM HYDROBROMIDE 20 MG: 20 TABLET ORAL at 17:01

## 2022-05-26 RX ADMIN — PANTOPRAZOLE SODIUM 40 MG: 40 TABLET, DELAYED RELEASE ORAL at 09:19

## 2022-05-26 RX ADMIN — CASTOR OIL AND BALSAM, PERU: 788; 87 OINTMENT TOPICAL at 09:20

## 2022-05-26 RX ADMIN — ACETAMINOPHEN 650 MG: 325 TABLET ORAL at 17:06

## 2022-05-26 RX ADMIN — SUCRALFATE 1 G: 1 TABLET ORAL at 22:22

## 2022-05-26 RX ADMIN — FOLIC ACID 1 MG: 1 TABLET ORAL at 09:19

## 2022-05-26 RX ADMIN — ATORVASTATIN CALCIUM 10 MG: 10 TABLET, FILM COATED ORAL at 22:22

## 2022-05-26 RX ADMIN — PANTOPRAZOLE SODIUM 40 MG: 40 TABLET, DELAYED RELEASE ORAL at 17:01

## 2022-05-26 RX ADMIN — SUCRALFATE 1 G: 1 TABLET ORAL at 17:01

## 2022-05-26 RX ADMIN — SUCRALFATE 1 G: 1 TABLET ORAL at 09:19

## 2022-05-26 NOTE — PROGRESS NOTES
Problem: Mobility Impaired (Adult and Pediatric)  Goal: *Acute Goals and Plan of Care (Insert Text)  Description: FUNCTIONAL STATUS PRIOR TO ADMISSION: Patient fell 5/11/2022 and fractured her right humerus, she has had limited mobility since and has required assist from daughter for ADL/mobility (although daughter works during the day out of the home)    [facility]: The patient lived with daughter/son-in-law and 2 granddaughters and required moderate assistance  for ADL, minimal assist for mobility since her fall. Patient states that ramp will be built for home entry in the near future. Physical Therapy Goals  Initiated 5/21/2022  1. Patient will move from supine to sit and sit to supine  in bed with minimal assistance/contact guard assist within 7 day(s). 2.  Patient will transfer from bed to chair and chair to bed with minimal assistance/contact guard assist using the least restrictive device within 7 day(s). 3.  Patient will perform sit to stand with minimal assistance/contact guard assist within 7 day(s). 4.  Patient will ambulate with minimal assistance/contact guard assist for 50 feet with the least restrictive device within 7 day(s). 5.  Patient will ascend/descend 4 stairs with left handrail(s) with minimal assistance/contact guard assist within 7 day(s). Outcome: Progressing Towards Goal  Note:   PHYSICAL THERAPY TREATMENT  Patient: Malini Fried (4 y.o. female)  Date: 5/26/2022  Diagnosis: Acute GI bleeding [K92.2]  Symptomatic anemia [D64.9] <principal problem not specified>  Procedure(s) (LRB):  ESOPHAGOGASTRODUODENOSCOPY (EGD) (N/A)  ESOPHAGOGASTRODUODENAL (EGD) BIOPSY (N/A)  RESOLUTION CLIP (N/A) 3 Days Post-Op  Precautions: NWB (right UE)  Chart, physical therapy assessment, plan of care and goals were reviewed. ASSESSMENT  Patient continues with skilled PT services and is slowly progressing towards goals.  Patient in semi hayes's upon arrival and agreeable to mobility. LE's and right hand noted to have swelling. OT placed sling in proper position prior to mobility. Able to come to EOB with mod assist then able to maintain balance independently. Transferred from bed to bedside commode with mod assist, leaking urine during transfer. Patient required assistance for hygiene then able to take a few steps from commode to chair. Right UE supported and patient positioned for comfort. Recommend SNF at discharge. Current Level of Function Impacting Discharge (mobility/balance): min/mod assist    Other factors to consider for discharge: NWB right UE         PLAN :  Patient continues to benefit from skilled intervention to address the above impairments. Continue treatment per established plan of care. to address goals. Recommendation for discharge: (in order for the patient to meet his/her long term goals)  Therapy up to 5 days/week in SNF setting    This discharge recommendation:  Has been made in collaboration with the attending provider and/or case management    IF patient discharges home will need the following DME: to be determined (TBD)       SUBJECTIVE:   Patient stated I can try.     OBJECTIVE DATA SUMMARY:   Critical Behavior:  Neurologic State: Alert  Orientation Level: Oriented X4  Cognition: Follows commands  Safety/Judgement: Fall prevention  Functional Mobility Training:  Bed Mobility:     Supine to Sit: Moderate assistance     Scooting: Minimum assistance (in sitting)        Transfers:  Sit to Stand: Minimum assistance; Moderate assistance;Assist x2 (mod assist from lower surface)  Stand to Sit: Minimum assistance; Additional time        Bed to Chair: Moderate assistance;Assist x2                    Balance:  Sitting: Impaired  Sitting - Static: Good (unsupported)  Sitting - Dynamic: Not tested  Standing: Impaired; With support  Standing - Static: Constant support; Fair  Standing - Dynamic : Fair;Constant support  Ambulation/Gait Training:  Distance (ft): 5 Feet (ft)  Assistive Device: Gait belt (HHA)  Ambulation - Level of Assistance: Minimal assistance;Assist x2        Gait Abnormalities: Decreased step clearance  Right Side Weight Bearing: Full  Left Side Weight Bearing: Full  Base of Support: Widened     Speed/Annalise: Shuffled; Slow                   Pain Rating:  None reported but pain with right UE movement    Activity Tolerance:   Fair    After treatment patient left in no apparent distress:   Sitting in chair, Call bell within reach, and Bed / chair alarm activated    COMMUNICATION/COLLABORATION:   The patients plan of care was discussed with: Occupational therapist and Registered nurse.       Flatness, PT   Time Calculation: 29 mins

## 2022-05-26 NOTE — PROGRESS NOTES
Problem: Self Care Deficits Care Plan (Adult)  Goal: *Acute Goals and Plan of Care (Insert Text)  Description: FUNCTIONAL STATUS PRIOR TO ADMISSION: Patient was modified independent using a rolling walker for functional mobility. HOME SUPPORT: The patient lived with daughter and required maximal assistance for ADLs since she had her fall and broke her  right humerus. Occupational Therapy Goals  Weekly reassessment, and goal remain the same, 5/26/22  Initiated 5/21/2022  1. Patient will perform grooming at the sink with supervision/set-up within 7 day(s). 2.  Patient will perform bathing at the sink with moderate assistance  within 7 day(s). 3.  Patient will perform lower body dressing with moderate assistance  within 7 day(s). 4.  Patient will perform toilet transfers with supervision/set-up within 7 day(s). 5.  Patient will perform all aspects of toileting with independence within 7 day(s). 6. Patient to be able to adjust sling in order for her hand to be above her heart to decrease swelling in right hand, with in 7 days. Outcome: Progressing Towards Goal   OCCUPATIONAL THERAPY TREATMENT/WEEKLY RE-EVALUATION  Patient: Jayleen Mohr (32 y.o. female)  Date: 5/26/2022  Diagnosis: Acute GI bleeding [K92.2]  Symptomatic anemia [D64.9] <principal problem not specified>  Procedure(s) (LRB):  ESOPHAGOGASTRODUODENOSCOPY (EGD) (N/A)  ESOPHAGOGASTRODUODENAL (EGD) BIOPSY (N/A)  RESOLUTION CLIP (N/A) 3 Days Post-Op  Precautions: NWB (right UE)  Chart, occupational therapy assessment, plan of care, and goals were reviewed. ASSESSMENT  Based on the objective data described below, pt was supine in bed and was encouraged to get up in to the chair for lunch. Pts sling was not on and her right hand/arm continues to be swollen, and sling was adjusted and her hand was elevated. Pt was mod to min for bed mobility, and able to scoot with CGA to EOB.  Pt was voiding in the floor, and min of 2 to [facility] and she was min -mod of 2 to stand from [facility], and she was mod to min to stand while she was cleaning juanito area, and max for cleaning buttocks. She was min of 2 for transfer to chair and right UE elevated. Pt is progressing and recommend SNF at discharge     Current Level of Function Impacting Discharge (ADLs): max to mod for ADLs            PLAN :  Goals have been updated based on progression since last assessment. Patient continues to benefit from skilled intervention to address the above impairments. Continue to follow patient 4 times a week to address goals. Recommend with staff: Rocael Bhat for meals and use of BSC     Recommend next OT session: work on grooming with standing short periods of time    Recommendation for discharge: (in order for the patient to meet his/her long term goals)  Therapy up to 5 days/week in SNF setting    This discharge recommendation:  Has been made in collaboration with the attending provider and/or case management    Equipment recommendations for successful discharge (if) home: tbd       SUBJECTIVE:   Patient stated I dont really want to get up.     OBJECTIVE DATA SUMMARY:   Cognitive/Behavioral Status:  Neurologic State: Alert  Orientation Level: Oriented X4  Cognition: Follows commands  Perception: Appears intact  Perseveration: No perseveration noted  Safety/Judgement: Fall prevention    Functional Mobility and Transfers for ADLs:  Bed Mobility:   Min to mod of 2    Transfers:     Functional Transfers  Toilet Transfer : Minimum assistance;Assist x2       Balance:   impaired    ADL Intervention:  Feeding  Feeding Assistance: Set-up    Grooming  Position Performed: Seated in chair  Washing Face: Set-up  Washing Hands: Set-up  Brushing Teeth: Set-up    Upper Body Bathing  Bathing Assistance: Maximum assistance  Position Performed: Seated in chair    Lower Body Bathing  Bathing Assistance: Maximum assistance; Moderate assistance  Perineal  : Maximum assistance; Moderate assistance  Position Performed: Standing              Toileting  Toileting Assistance: Moderate assistance;Maximum assistance  Bladder Hygiene: Moderate assistance;Maximum assistance  Bowel Hygiene:  Moderate assistance;Maximum assistance  Clothing Management: Maximum assistance    Cognitive Retraining  Safety/Judgement: Fall prevention    Pain:  Pt stated she had pain in right UE but did not rate    Activity Tolerance:   Fair    After treatment patient left in no apparent distress:   Sitting in chair, Call bell within reach, and Bed / chair alarm activated    COMMUNICATION/COLLABORATION:   The patients plan of care was discussed with: Physical Therapist and Registered Nurse    Aileen Cullen OT  Time Calculation: 23 mins

## 2022-05-26 NOTE — PROGRESS NOTES
Hospitalist Progress Note    NAME: Veronica Jay   :  1940   MRN:  [id number]       Estimated discharge date:  2022    Barriers: Need stable Plts, then SNF bed    Acute upper GI bleed POA  Hemorrhagic antral gastropathy POA  GERD POA  Acute blood loss anemia on chronic anemia (iron deficiency) POA  Thrombocytopenia dropped this AM, unclear significance  CT abdomen/pelvis 22:  1. No acute intra-abdominal pathology. No evidence of bowel obstruction. 2.   Shrunken liver with questionable nodular contour. Question history of cirrhosis. Abdominal pelvic ascites is noted with splenomegaly. 3.  Cholecystectomy. Common bile duct is mildly dilated which may be physiologic  given the patient's age. Recommend correlation with serum alkaline phosphatase. 4.  Nonobstructing right renal stone. EGD 22:  1. Normal proximal and mid esophagus  2. Small, sliding hiatal hernia  3. Severe, diffuse, hemorrhagic antral gastropathy with contact bleeding. Biopsied. Hemostatic clip placed x 1 for continued oozing bleeding from biopsy site with hemostasis achieved. 4.  Stomach otherwise normal, including retroflexion  5. Normal duodenal bulb and 2nd/3rd portion of the duodenum  - GI input appreciated. - Patient received 2 unit PRBCs, total, last 2022   - Transfuse for Hgb <7.0  - Hgb now 8.5 to 9.1  - Continue pantoprazole 40 mg IV q12h. - Continue sucralfate 1 gm po qid, will need for 2 weeks. - PLTs dropped this AM to 49,000        Has not received any heparin this admit  - Recheck blood counts early this afternoon with coags, if okay, then discharge        Chronically low PLTs 90,000 to 120,000 seems to be usual baseline    Bilateral LE edema  Hypoalbuminemia  - CXR 22:  No acute cardiopulmonary disease. Right humeral fracture.    - LVEF 55-60%, normal wall thickness, normal RV size and function  - Hypoalbuminemia likely contributing to edema.  - Patient received dose of IV furosemide after first unit of blood. - Since edema mana without unilateral difference will hold on venous dopplers. - s/p albumin earlier in admit for hypotension. Suspected chronic kidney disease stage 4 POA  - CrCl 28, baseline creat 1.3 to 1.5 here  - Avoid nephrotoxins.   - Adjust meds based on CrCl. - Monitor. Hypokalemia   - Supplement. - Monitor. Recent right humerus fraction   - CT head 05/20/22: Moderate chronic small vessel ischemic disease. No acute intracranial abnormality.  - RUE edematous. - Maintain sling.  - Elevate as able. - OP follow up with ortho on 06/03/22, per ortho patient not good candidate for surgical intervention. HTN with hypotensive episode   Dyslipidemia   PAD  - Continue carvedilol 3.125 mg po bid. - Continue atorvastatin 10 mg po daily.   - Hold pentoxifylline 2/2 GIB. Depression   Anxiety disorder  - Continue citalopram 20 mg po daily at hs. Insomnia   - Continue trazodone 25 mg po daily at hs.         25.0 - 29.9 Overweight / Body mass index is 28.91 kg/m². Code status: Full  Prophylaxis: SCDs  Recommended Disposition: TBD     Subjective:     Chief Complaint / Reason for Physician Visit  No complaints. \"no more bleeding\"  Denies abdominal pain. Main complaint is pain in the right arm  PLTs dropped this Am, unclear significance  Plan of care and pertinent events reviewed with bedside nurse. Review of Systems:  Symptom Y/N Comments  Symptom Y/N Comments   Fever/Chills N   Chest Pain N    Poor Appetite    Edema n    Cough N   Abdominal Pain N    Sputum    Joint Pain y Right arm   SOB/DAO N   Pruritis/Rash     Nausea/vomit N   Tolerating PT/OT     Diarrhea N   Tolerating Diet Y    Constipation    Other       Could NOT obtain due to:      Objective:     VITALS:   Last 24hrs VS reviewed since prior progress note.  Most recent are:  Patient Vitals for the past 24 hrs:   Temp Pulse Resp BP SpO2   05/26/22 0816 98.2 °F (36.8 °C) (!) 58 16 (!) 167/49 99 %   05/26/22 0300 98.2 °F (36.8 °C) (!) 53 16 116/60 98 %   05/25/22 2048 98.7 °F (37.1 °C) (!) 54 16 122/65 99 %   05/25/22 1554 97.9 °F (36.6 °C) (!) 50 18 (!) 148/49 100 %   05/25/22 1116 -- (!) 50 -- (!) 160/69 --   05/25/22 1100 -- (!) 54 -- (!) 171/76 --       Intake/Output Summary (Last 24 hours) at 5/26/2022 0934  Last data filed at 5/26/2022 0300  Gross per 24 hour   Intake --   Output 900 ml   Net -900 ml        I had a face to face encounter and independently examined this patient on 5/26/2022, as outlined below:  PHYSICAL EXAM:  General:  A/A/O.  NAD. HEENT:  Normocephalic. Sclera anicteric. Mucous membranes moist.    Chest:  Resps even/unlabored with symmetrical CWE. Air entry full. Lungs CTA. No use of accessory muscles. CV:  RRR. Normal S1/S2. No M/C/R appreciated. No JVD. RUE (fracture side) with large pitting edema. 1-2 mm pitting pretibial edema noted mana. Cap refill < 3 sec. Peripheral pulses 1+. GI:  Abdomen soft/NT/ND. ABT X 4.    :  Voiding. Neurologic:  Tribe. Face symmetrical.  Speech normal.     Psych:  Cooperative. No anxiety or agitation. No suicidal/homicidal ideation. Skin:  Warm and color appropriate. No rashes or jaundice. Turgor elastic. Reviewed most current lab test results and cultures  YES  Reviewed most current radiology test results   YES  Review and summation of old records today    NO  Reviewed patient's current orders and MAR    YES  PMH/SH reviewed - no change compared to H&P  ________________________________________________________________________  Care Plan discussed with:    Comments   Patient [address]     Consultant                        Multidiciplinary team rounds were held today with , nursing, pharmacist and clinical coordinator. Patient's plan of care was discussed; medications were reviewed and discharge planning was addressed. ________________________________________________________________________  Jermain Andre MD     Procedures: see electronic medical records for all procedures/Xrays and details which were not copied into this note but were reviewed prior to creation of Plan. LABS:  I reviewed today's most current labs and imaging studies. Pertinent labs include:  Recent Labs     05/26/22  0400 05/25/22  0531 05/24/22  1713 05/24/22  0352 05/24/22  0352   WBC 3.1* 3.4*  --   --  3.3*   HGB 9.5* 9.1* 8.5*   < > 6.8*   HCT 31.2* 29.1* 27.2*   < > 22.1*   PLT 49* 85*  --   --  80*    < > = values in this interval not displayed.      Recent Labs     05/26/22  0700 05/25/22  0531 05/24/22  0352    141 142   K 4.0 4.0 3.3*   * 110* 108   CO2 27 28 29   GLU 86 89 155*   BUN 22* 25* 29*   CREA 1.37* 1.42* 1.50*   CA 8.1* 8.3* 8.0*       Signed: Jermain Andre MD

## 2022-05-26 NOTE — PROGRESS NOTES
[address] [address]                                  CONSULTATION    PATIENT NAME: Clementina Rodriguez                      :        1974  MED REC NO:   [id number]                          ROOM:       1920  ACCOUNT NO:   [de-identified]                           ADMIT DATE: 10/24/2020  PROVIDER:     Jessica Henry MD    CONSULT DATE:  10/26/2020    CARDIAC ELECTROPHYSIOLOGY CONSULTATION    REASON FOR CONSULTATION:  Ventricular tachycardia. HISTORY OF PRESENT ILLNESS:  The patient is a 49-year-old woman with a  history of anxiety, depression, and fibromyalgia, who was transferred  from [facility] on 10/24/2020 for chest pain. She  described interscapular pain which awakened her from sleep. She also  had some discomfort in the right neck and right arm. At the time of  admission, she was thought to have angina pectoris. She underwent  graded exercise testing with perfusion imaging on 10/24/2020. This  demonstrated some ST-segment depression and nonsustained ventricular  tachycardia which was right bundle superior in axis. The patient has a  history of intermittent chest pain and palpitations. She underwent Lexiscan Myoview study on 2019 which demonstrated no  evidence for inducible myocardial ischemia. She also underwent  ambulatory ECG monitoring in 2020. That study demonstrated average  heart rate of 99 beats per minute with occasional symptomatic single  PVCs. Her overall PVC burden was 0.08%. No ventricular tachycardia was  identified at that time. She underwent cardiac catheterization on  10/26/2020 which demonstrated normal epicardial coronary arteries with  normal left ventricular function by contrast ventriculography and an  ejection fraction estimated at 55% to 60%.     The patient reports that she occasionally has episodes of  exercise-induced palpitations, but these have been Problem: Falls - Risk of  Goal: *Absence of Falls  Description: Document Mago Yanes Fall Risk and appropriate interventions in the flowsheet. 5/26/2022 0500 by Lorna Hendrix RN  Outcome: Progressing Towards Goal  Note: Fall Risk Interventions:  Mobility Interventions: Communicate number of staff needed for ambulation/transfer         Medication Interventions: Patient to call before getting OOB    Elimination Interventions: Call light in reach    History of Falls Interventions: Door open when patient unattended      5/25/2022 2057 by Lorna Hendrix RN  Outcome: Progressing Towards Goal  Note: Fall Risk Interventions:  Mobility Interventions: Communicate number of staff needed for ambulation/transfer         Medication Interventions: Patient to call before getting OOB    Elimination Interventions: Call light in reach    History of Falls Interventions: Door open when patient unattended,Investigate reason for fall         Problem: Patient Education: Go to Patient Education Activity  Goal: Patient/Family Education  Outcome: Progressing Towards Goal     Problem: Pressure Injury - Risk of  Goal: *Prevention of pressure injury  Description: Document Zachary Scale and appropriate interventions in the flowsheet.   Outcome: Progressing Towards Goal  Note: Pressure Injury Interventions:  Sensory Interventions: Assess changes in LOC,Assess need for specialty bed    Moisture Interventions: Absorbent underpads,Check for incontinence Q2 hours and as needed    Activity Interventions: Increase time out of bed    Mobility Interventions: Assess need for specialty bed    Nutrition Interventions: Document food/fluid/supplement intake    Friction and Shear Interventions: HOB 30 degrees or less                Problem: Patient Education: Go to Patient Education Activity  Goal: Patient/Family Education  Outcome: Progressing Towards Goal relatively infrequent  and brief in duration. She describes herself as very active. PAST MEDICAL HISTORY:  1. Anxiety. 2.  Fibromyalgia. 3.  Hyperlipidemia. 4.  Gastroesophageal reflux. MEDICATIONS:  At the time of admission include Zithromax 500 mg followed  by 250 mg per day for five days, baclofen 10 mg p.o. b.i.d. as needed,  BuSpar 10 mg p.o. t.i.d. p.r.n., Welchol 1250 mg p.o. t.i.d.,  hydrochlorothiazide 25 mg p.o. q.d., potassium chloride 10 mEq p.o.  q.d., Pravachol 20 mg p.o. q.d. ALLERGIES:  Include LASIX, BACTRIM, CODEINE, MACROBID, and MORPHINE. SOCIAL HISTORY:  The patient is a current cigarette smoker who smokes  about five cigarettes per day. She does consume alcohol on an  occasional basis. FAMILY HISTORY:  Remarkable for hypertension and hyperlipidemia in the  mother. There is a history of hypertension, diabetes and heart disease  in the father. There is extensive history of osteoarthritis in the  family. There is no known family history of cardiac rhythm disturbance,  syncope or sudden cardiac death. REVIEW OF SYSTEMS:  Demonstrates no recent change in appetite or change  in weight. The patient has not had recent fever or chills. She  describes the above-mentioned interscapular pain with radiation down the  arms. She does describe exercise-induced palpitations, but these are  relatively rare and brief in duration. Her functional status appears to  be quite well preserved. All other components of the 14-systems review  are negative. PHYSICAL EXAMINATION:  VITAL SIGNS:  Blood pressure is 102/67 and heart rate is 76 beats per  minute and regular. The patient is afebrile. GENERAL:  She is awake, alert, oriented, and in no discomfort. HEENT:  Exam demonstrates normocephalic and atraumatic head. There is  no scleral icterus. Pupils are round and reactive. NECK:  Supple without thyromegaly. There is no cervical  lymphadenopathy.   LUNGS:  Clear to auscultation. CARDIOVASCULAR:  Exam reveals a regular rhythm. The apical impulse is  discrete. S1 and S2 are normal.  There is no audible murmur. There is  no late systolic click. The jugular venous pressure is difficult to  assess. ABDOMEN:  Mildly obese, soft, nontender. EXTREMITIES:  Demonstrate no pitting or pretibial dependent edema. There is no cyanosis. There is no evidence for chronic venous stasis. SKIN:  Otherwise warm and dry without skin rash. DIAGNOSTIC DATA:  A 12-lead ECG from 10/25/2020 demonstrates sinus  rhythm at 79 beats per minute and is within normal limits. IMPRESSION:  1. 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 2.  Atypical chest pain. 3.  Hyperlipidemia. 4.  Gastroesophageal reflux disease. The patient presents with atypical chest pain. Cardiac cath  demonstrates normal left ventricular function and normal epicardial  coronary arteries. She did have brief episodes of nonsustained VT  during her exercise test.  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 This is thought to be triggered in  mechanism and often responds to p.o. verapamil. I would propose  initiating p.o. verapamil 180 mg p.o. q.d., then redoing the exercise  component of the stress test in about one week to make certain that the  nonsustained ventricular tachycardia has been suppressed. Interestingly, she had a previous exercise stress test in 2012 at which  time she reached a maximum heart rate of 155 beats per minute with no  ventricular tachycardia. RECOMMENDATIONS:  1.  Verapamil  mg p.o. q.d.  2.  Repeat plain GXT in one week. 3.  Follow up with EP. Thank you for the opportunity to assist in the care of the patient. Please contact me if you have any questions regarding her evaluation.         Gloria Ramirez MD    D: 10/26/2020 12:30:41       T: 10/26/2020 14:59:59     [id number]  Job#: [id number]     Doc#: [id number]    CC:

## 2022-05-26 NOTE — PROGRESS NOTES
Transition of Care Plan:    RUR: 14% low risk  Disposition: SNF (pending acceptance at [facility])  Daughter has agreed to have referrals sent to other locations near the [address] [place] [address] area. Follow up appointments: To be arranged by SNF  DME needed: to be provided by SNF  Transportation at Discharge: [facility] transport  [place] or means to access home: family has access       IM Medicare Letter:  2nd IMM letter will be needed after 5/26/22  Is patient a BCPI-A Bundle:    n/a       If yes, was Bundle Letter given?:    Is patient a [place] and connected with the [address]? n/a               If yes, was [place] transfer form completed and [facility] notified? Caregiver Contact: Daughter, Charlotte Garcia ([phone])  Discharge Caregiver contacted prior to discharge? Daughter has been contacted about discharge R [address] needed?:  Not at this time    CM spoke with daughter about patient being ready for discharge and plan to return home with [facility]. Daughter states that patient is not at baseline functioning and thinks that patient would benefit from going to SNF prior to returning home. Daughter states that she wants referral sent to [facility] and a list of SNF providers were emailed to daughter to review for additional choices. Referral sent to [facility], waiting on them to review and decide if they can accept. Care Management Interventions  PCP Verified by CM: Yes Dina Houston MD )  Last Visit to PCP: 05/19/22  Palliative Care Criteria Met (RRAT>21 & CHF Dx)?: No (No MD order)  Mode of Transport at Discharge:  Other (see comment) (POV)  Transition of Care Consult (CM Consult): Discharge Planning  Physical Therapy Consult: Yes  Occupational Therapy Consult: Yes  Speech Therapy Consult: No  Support Systems: Child(diane)  Confirm Follow Up Transport: Family  The Plan for Transition of Care is Related to the Following Treatment Goals : Treat GI bleed  Discharge Location  Patient Expects to be Discharged to[de-identified] Skilled nursing facility    Jose Alejandro.  [address], [address] [place] - ED [facility]  [employer] ACP Facilitator  Zone Phone: [phone]

## 2022-05-26 NOTE — PROGRESS NOTES
Juliano Murillo RN to call report to: [phone] Room #: 100  Fax#: [phone]    [facility]: 12:00 pm on 5/27. Transition of Care Plan:     RUR: 14% low risk  Disposition: SNF- [facility]   Follow up appointments: To be arranged by SNF  DME needed: to be provided by SNF  Transportation at Discharge:  [facility] - 12:00 pm on 5/27  [place] or means to access home: family has access       IM Medicare Letter:  2nd IM letter - received and signed 5/26. Is patient a BCPI-A Bundle: No               If yes, was Bundle Letter given?: N/A   Is patient a  and connected with the [place]? No         If yes, was [employer] transfer form completed and VA notified? Caregiver Contact: Daughter, Goyo Patel ([phone])  Discharge Caregiver contacted prior to discharge? yes  Care Conference needed?:  Not at this time    Updated Note 4:40 pm:  CM received phone call from pt's daughter Goyo Patel). CM updated her with discharge plan. Updated Note 4:20 pm: [facility] scheduled for 12:00 pm on 5/27. CM at bedside to discuss discharge planning. Pt verbalized understanding and has no questions for CM. CM attempted to contacte dtr Goyo Patel). VM left    2nd IM Medicare letter - CM gave pt letter and explained. Pt verbalized understanding and signed letter (in chart). Pt has copy of letter. Updated Note 4:05 pm: CM requested for [facility] to transport at noon tomorrow. Discharge will be based if pt is medically stable. Initial Note 10:50 am: CM spoke to [facility] in admissions. They can accept pt tomorrow. CM sent message to Dr. Georgi Medeiros via perfect serve to update and ask for rapid COVID test.     Unit CM will continue to follow.      Devin Joy RN, BSN, [address] [place] [address] Care Manager  [phone]

## 2022-05-26 NOTE — PROGRESS NOTES
End of Shift Note    Bedside shift change report given to Kishore Tripp RN (oncoming nurse) by Izaiah Campos LPN (offgoing nurse). Report included the following information SBAR, Kardex and MAR    Shift worked:  7a-7p     Shift summary and any significant changes:    IV infiltrated and platelets dropped. Notified Dr. Yaron Juarez of lab abnormality. PICC team placed new IV in patient and obtained labs. Patient tolerating diet. Concerns for physician to address: none   Zone phone for oncoming shift:   [phone]       Activity:  Activity Level: Up with Assistance  Number times ambulated in hallways past shift: 0  Number of times OOB to chair past shift: 1    Cardiac:   Cardiac Monitoring: Yes      Cardiac Rhythm: Sinus Javy    Access:   Current line(s): PIV     Genitourinary:   Urinary status: voiding    Respiratory:   O2 Device: None (Room air)  Chronic home O2 use?: NO  Incentive spirometer at bedside: NO       GI:  Last Bowel Movement Date: 05/24/22  Current diet:  ADULT ORAL NUTRITION SUPPLEMENT Breakfast, Lunch, Dinner; Clear Liquid  ADULT DIET Regular; GI [place] (GERD/Peptic Ulcer)  Passing flatus: YES  Tolerating current diet: YES       Pain Management:   Patient states pain is manageable on current regimen: YES    Skin:  Zachary Score: 17  Interventions: increase time out of bed and nutritional support     Patient Safety:  Fall Score:  Total Score: 4  Interventions: gripper socks, pt to call before getting OOB and stay with me (per policy)  High Fall Risk: Yes    Length of Stay:  Expected LOS: 3d 0h  Actual LOS: 6      Izaiah Campos LPN

## 2022-05-27 VITALS
DIASTOLIC BLOOD PRESSURE: 38 MMHG | SYSTOLIC BLOOD PRESSURE: 150 MMHG | WEIGHT: 158.07 LBS | HEIGHT: 62 IN | HEART RATE: 59 BPM | BODY MASS INDEX: 29.09 KG/M2 | RESPIRATION RATE: 15 BRPM | TEMPERATURE: 98.3 F | OXYGEN SATURATION: 99 %

## 2022-05-27 LAB
BASOPHILS # BLD: 0 K/UL (ref 0–0.1)
BASOPHILS NFR BLD: 1 % (ref 0–1)
COVID-19 RAPID TEST, COVR: NOT DETECTED
DIFFERENTIAL METHOD BLD: ABNORMAL
EOSINOPHIL # BLD: 0.2 K/UL (ref 0–0.4)
EOSINOPHIL NFR BLD: 5 % (ref 0–7)
ERYTHROCYTE [DISTWIDTH] IN BLOOD BY AUTOMATED COUNT: 18.7 % (ref 11.5–14.5)
HCT VFR BLD AUTO: 33.4 % (ref 35–47)
HGB BLD-MCNC: 10.6 G/DL (ref 11.5–16)
IMM GRANULOCYTES # BLD AUTO: 0 K/UL (ref 0–0.04)
IMM GRANULOCYTES NFR BLD AUTO: 1 % (ref 0–0.5)
LYMPHOCYTES # BLD: 0.9 K/UL (ref 0.8–3.5)
LYMPHOCYTES NFR BLD: 24 % (ref 12–49)
MCH RBC QN AUTO: 29.8 PG (ref 26–34)
MCHC RBC AUTO-ENTMCNC: 31.7 G/DL (ref 30–36.5)
MCV RBC AUTO: 93.8 FL (ref 80–99)
MONOCYTES # BLD: 0.3 K/UL (ref 0–1)
MONOCYTES NFR BLD: 9 % (ref 5–13)
NEUTS SEG # BLD: 2.2 K/UL (ref 1.8–8)
NEUTS SEG NFR BLD: 61 % (ref 32–75)
NRBC # BLD: 0 K/UL (ref 0–0.01)
NRBC BLD-RTO: 0 PER 100 WBC
PLATELET # BLD AUTO: 89 K/UL (ref 150–400)
PMV BLD AUTO: 11.4 FL (ref 8.9–12.9)
RBC # BLD AUTO: 3.56 M/UL (ref 3.8–5.2)
SOURCE, COVRS: NORMAL
WBC # BLD AUTO: 3.7 K/UL (ref 3.6–11)

## 2022-05-27 PROCEDURE — 74011250637 HC RX REV CODE- 250/637: Performed by: INTERNAL MEDICINE

## 2022-05-27 PROCEDURE — 85025 COMPLETE CBC W/AUTO DIFF WBC: CPT

## 2022-05-27 PROCEDURE — 74011000250 HC RX REV CODE- 250: Performed by: INTERNAL MEDICINE

## 2022-05-27 PROCEDURE — 36415 COLL VENOUS BLD VENIPUNCTURE: CPT

## 2022-05-27 PROCEDURE — 74011250637 HC RX REV CODE- 250/637: Performed by: STUDENT IN AN ORGANIZED HEALTH CARE EDUCATION/TRAINING PROGRAM

## 2022-05-27 PROCEDURE — 74011250636 HC RX REV CODE- 250/636: Performed by: INTERNAL MEDICINE

## 2022-05-27 PROCEDURE — 87635 SARS-COV-2 COVID-19 AMP PRB: CPT

## 2022-05-27 RX ORDER — PANTOPRAZOLE SODIUM 40 MG/1
40 TABLET, DELAYED RELEASE ORAL
Qty: 60 TABLET | Refills: 2 | Status: SHIPPED
Start: 2022-05-27 | End: 2022-06-26

## 2022-05-27 RX ORDER — SUCRALFATE 1 G/1
1 TABLET ORAL
Qty: 40 TABLET | Refills: 0 | Status: SHIPPED
Start: 2022-05-27 | End: 2022-06-06

## 2022-05-27 RX ORDER — TRAMADOL HYDROCHLORIDE 50 MG/1
50 TABLET ORAL
Qty: 6 TABLET | Refills: 0 | Status: SHIPPED | OUTPATIENT
Start: 2022-05-27 | End: 2022-05-30

## 2022-05-27 RX ADMIN — SODIUM CHLORIDE, PRESERVATIVE FREE 10 ML: 5 INJECTION INTRAVENOUS at 00:02

## 2022-05-27 RX ADMIN — CASTOR OIL AND BALSAM, PERU: 788; 87 OINTMENT TOPICAL at 07:45

## 2022-05-27 RX ADMIN — ACETAMINOPHEN 650 MG: 325 TABLET ORAL at 00:01

## 2022-05-27 RX ADMIN — SUCRALFATE 1 G: 1 TABLET ORAL at 06:39

## 2022-05-27 RX ADMIN — FOLIC ACID 1 MG: 1 TABLET ORAL at 09:03

## 2022-05-27 RX ADMIN — PHYTONADIONE 10 MG: 10 INJECTION, EMULSION INTRAMUSCULAR; INTRAVENOUS; SUBCUTANEOUS at 12:31

## 2022-05-27 RX ADMIN — PANTOPRAZOLE SODIUM 40 MG: 40 TABLET, DELAYED RELEASE ORAL at 06:39

## 2022-05-27 RX ADMIN — SODIUM CHLORIDE, PRESERVATIVE FREE 10 ML: 5 INJECTION INTRAVENOUS at 06:34

## 2022-05-27 RX ADMIN — SUCRALFATE 1 G: 1 TABLET ORAL at 12:30

## 2022-05-27 NOTE — PROGRESS NOTES
Problem: Falls - Risk of  Goal: *Absence of Falls  Description: Document Sylvie Storm Fall Risk and appropriate interventions in the flowsheet. 5/27/2022 1134 by Chelsey Lentz RN  Outcome: Resolved/Met  5/27/2022 1019 by Chelsey Lentz RN  Outcome: Progressing Towards Goal  Note: Fall Risk Interventions:  Mobility Interventions: Assess mobility with egress test,Communicate number of staff needed for ambulation/transfer,Patient to call before getting OOB         Medication Interventions: Teach patient to arise slowly,Patient to call before getting OOB    Elimination Interventions: Call light in reach    History of Falls Interventions: Room close to nurse's station         Problem: Patient Education: Go to Patient Education Activity  Goal: Patient/Family Education  5/27/2022 1134 by Chelsey Lentz RN  Outcome: Resolved/Met  5/27/2022 1019 by Chelsey Lentz RN  Outcome: Progressing Towards Goal     Problem: Pressure Injury - Risk of  Goal: *Prevention of pressure injury  Description: Document Zachary Scale and appropriate interventions in the flowsheet.   Outcome: Resolved/Met     Problem: Patient Education: Go to Patient Education Activity  Goal: Patient/Family Education  Outcome: Resolved/Met     Problem: Patient Education: Go to Patient Education Activity  Goal: Patient/Family Education  Outcome: Resolved/Met     Problem: Patient Education: Go to Patient Education Activity  Goal: Patient/Family Education  Outcome: Resolved/Met

## 2022-05-27 NOTE — PROGRESS NOTES
Problem: Falls - Risk of  Goal: *Absence of Falls  Description: Document Trinity Alvarenga Fall Risk and appropriate interventions in the flowsheet.   Outcome: Progressing Towards Goal  Note: Fall Risk Interventions:  Mobility Interventions: Assess mobility with egress test,Communicate number of staff needed for ambulation/transfer,Patient to call before getting OOB         Medication Interventions: Teach patient to arise slowly,Patient to call before getting OOB    Elimination Interventions: Call light in reach    History of Falls Interventions: Room close to nurse's station         Problem: Patient Education: Go to Patient Education Activity  Goal: Patient/Family Education  Outcome: Progressing Towards Goal

## 2022-05-27 NOTE — PROGRESS NOTES
End of Shift Note    Bedside shift change report given to Toshia Pardo (oncoming nurse) by Marcie Vasquez RN (offgoing nurse). Report included the following information SBAR, Kardex, Intake/Output and MAR    Shift worked:  7p-7a     Shift summary and any significant changes:     Patient rested through night. unalbe to draw labs     Concerns for physician to address:  none     Zone phone for oncoming shift:   [phone]       Activity:  Activity Level: Up with Assistance  Number times ambulated in hallways past shift: 0  Number of times OOB to chair past shift: 1    Cardiac:   Cardiac Monitoring: Yes      Cardiac Rhythm: Sinus Javy    Access:   Current line(s): PIV     Genitourinary:   Urinary status: voiding and external catheter    Respiratory:   O2 Device: None (Room air)  Chronic home O2 use?: NO  Incentive spirometer at bedside: NO       GI:  Last Bowel Movement Date: 05/24/22  Current diet:  ADULT ORAL NUTRITION SUPPLEMENT Breakfast, Lunch, Dinner; Clear Liquid  ADULT DIET Regular; GI [place] (GERD/Peptic Ulcer)  Passing flatus: YES  Tolerating current diet: YES       Pain Management:   Patient states pain is manageable on current regimen: NO    Skin:  Zachary Score: 17  Interventions: turn team    Patient Safety:  Fall Score:  Total Score: 4  Interventions: gripper socks  High Fall Risk: Yes    Length of Stay:  Expected LOS: 3d 0h  Actual LOS: [address] [facility] [address], RN

## 2022-05-27 NOTE — PROGRESS NOTES
Hospitalist Progress Note    NAME: Danyelle Weiner   :  1940   MRN:  [id number]       Admit date: 2022    Discharge date: 22    PCP: Nic Ellis MD    Discharge Diagnoses:    Acute upper GI bleed POA    Hemorrhagic antral gastropathy POA    GERD POA    Acute blood loss anemia on chronic anemia (iron deficiency) POA s/p 2 units pRBCs    Chronic thrombocytopenia POA    Probable cirrhosis based on CT findings POA    Bilateral LE edema    Hypoalbuminemia    Suspected chronic kidney disease stage 4 POA    Hypokalemia     Recent right humerus fracture wearing sling    HTN with hypotensive episode at admit, now resolved    Dyslipidemia     PAD    Depression     Anxiety disorder    Insomnia      Overweight POA  Body mass index is 28.91 kg/m². Code status: Full    Discharge Medications:  Current Discharge Medication List      START taking these medications    Details   pantoprazole (PROTONIX) 40 mg tablet Take 1 Tablet by mouth Before breakfast and dinner for 30 days. Qty: 60 Tablet, Refills: 2      sucralfate (CARAFATE) 1 gram tablet Take 1 Tablet by mouth Before breakfast, lunch, dinner and at bedtime for 10 days. Qty: 40 Tablet, Refills: 0         CONTINUE these medications which have CHANGED    Details   traMADoL (ULTRAM) 50 mg tablet Take 1 Tablet by mouth every eight (8) hours as needed for Pain for up to 3 days. Max Daily Amount: 150 mg.  Qty: 6 Tablet, Refills: 0    Associated Diagnoses: Closed fracture of first lumbar vertebra with routine healing, unspecified fracture morphology, subsequent encounter         CONTINUE these medications which have NOT CHANGED    Details   folic acid (FOLVITE) 959 mcg tablet Take 800 mcg by mouth daily. magnesium 250 mg tab Take 250 mg by mouth daily (after dinner). multivitamin (ONE A DAY) tablet Take 1 Tab by mouth daily. carvediloL (Coreg) 3.125 mg tablet Take 3.125 mg by mouth two (2) times daily (with meals).       meclizine (ANTIVERT) 12.5 mg tablet Take 6.25 mg by mouth three (3) times daily as needed. ascorbic acid, vitamin C, (VITAMIN C) 500 mg tablet Take 500 mg by mouth daily. ferrous sulfate (IRON) 325 mg (65 mg iron) EC tablet Take 325 mg by mouth Daily (before breakfast). traZODone (DESYREL) 50 mg tablet Take 25 mg by mouth nightly. pentoxifylline CR (TRENTAL) 400 mg CR tablet Take 400 mg by mouth two (2) times a day. simvastatin (ZOCOR) 20 mg tablet Take 20 mg by mouth nightly. fenofibrate nanocrystallized (TRICOR) 145 mg tablet Take 145 mg by mouth nightly. citalopram (CELEXA) 20 mg tablet Take 20 mg by mouth every evening. STOP taking these medications       predniSONE (STERAPRED DS) 10 mg dose pack Comments:   Reason for Stopping:         methocarbamoL (Robaxin) 500 mg tablet Comments:   Reason for Stopping:         amLODIPine (NORVASC) 10 mg tablet Comments:   Reason for Stopping: Follow-up Information     Follow up With Specialties Details Why Contact Khushbu Hall MD Gastroenterology Schedule an appointment as soon as possible for a visit in 4 weeks Gastroenterology.   [address]  [address] [facility]  [phone]      Orthopedic outpt appointment  On 6/3/2022      Kenrick Solorio MD Wound Care Schedule an appointment as soon as possible for a visit in 1 week after discharge from rehab [facility] [address] [place] [address] , [address]   [address]  [phone]          Time spent on discharge:   I spent greater than 30 minutes on discharge, seeing and examining the patient, reconciling home meds and new meds, coordinating care with case management, doing the discharge papers and the D/C summary    Discharge disposition: SNF    Discharge Condition: Stable    Summary of admission H+P(copied from Dr Fermin Goodwin Note):     CHIEF COMPLAINT: Abnormal labs, nausea and vomiting     HISTORY OF PRESENT ILLNESS:     Clare Tarango is a 78 y.o.  female who presents with abnormal labs with low hemoglobin along with nausea and vomiting. Patient past medical history of iron deficiency anemia, hypertension, anxiety disorder, insomnia, hyperlipidemia. Patient had a fall few days ago and got a right humerus fracture. Patient had a follow-up appointment with PCP yesterday and got a lab work done. Last night patient got a call from the primary care doctor that hemoglobin is low and she needs to come to the hospital for evaluation. Currently the patient is doing fine had no chest pain or shortness of breath has on and off nausea with vomiting-by looking according to the daughter. No fever or chills, no headache, no dizziness, no passing out episodes. Patient is swelling the lower legs and has been on stockings.     We were asked to admit for work up and evaluation of the above problems.           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 Heart is normal in size. There is no pulmonary edema. There is no evident pneumothorax or pleural effusion.  There is right humeral  neck fracture  IMPRESSION  No acute cardiopulmonary disease. Right humeral fracture. Head CT read by radiology FINDINGS:  The ventricles and sulci are normal in size, shape and configuration. . There is  no significant white matter disease. There is no intracranial hemorrhage,  extra-axial collection, or mass effect. The basilar cisterns are open. No CT  evidence of acute infarct. The bone windows demonstrate no abnormalities. The visualized portions of the  paranasal sinuses and mastoid air cells are clear. IMPRESSION  Moderate chronic small vessel ischemic disease. No acute intracranial  Abnormality    CT abdomen/pelvis read by radiology FINDINGS:   LOWER THORAX: No significant abnormality in the incidentally imaged lower chest.  LIVER: Relatively small. Question nodular contour  BILIARY TREE: Cholecystectomy. Common bile duct is dilated to 13 mm. SPLEEN: Mildly enlarged measuring 14 cm  PANCREAS: No focal abnormality. ADRENALS: Unremarkable. KIDNEYS/URETERS: Nonobstructing right renal stone. STOMACH: Unremarkable. SMALL BOWEL: No dilatation or wall thickening. COLON: No dilatation or wall thickening. APPENDIX: Not visualized  PERITONEUM: Abdominal and pelvic ascites. RETROPERITONEUM: Atherosclerotic disease  REPRODUCTIVE ORGANS: Unremarkable  URINARY BLADDER: No mass or calculus. BONES: No destructive bone lesion. ABDOMINAL WALL: Diffuse subcutaneous edema. ADDITIONAL COMMENTS: N/A  IMPRESSION  1. No acute intra-abdominal pathology. No evidence of bowel obstruction. 2.   Shrunken liver with questionable nodular contour. Question history of  cirrhosis. Abdominal pelvic ascites is noted with splenomegaly. 3.  Cholecystectomy. Common bile duct is mildly dilated which may be physiologic  given the patient's age. Recommend correlation with serum alkaline phosphatase. 4.  Nonobstructing right renal stone    Right upper extremity doppler 5/26   Right Upper Venous  No evidence of acute DVT. The internal jugular, subclavian, axillary, brachial prox, brachial mid, brachial dist, radial, ulnar, cephalic upper arm and basilic upper arm vein(s) were visualized in the transverse and longitudinal views. The vessels showed normal color filling and compressibility. Doppler interrogation showed phasic and spontaneous flow. Left Upper Venous  For comparison purposes, the left subclavian/internal jugular veins were investigated. These veins appeared to show normal color filling and Doppler interrogation showed phasic, spontaneous and somewhat pulsatile flow. Echo TTE read by cardiology   Left Ventricle Normal left ventricular systolic function with a visually estimated EF of 55 - 60%. Left ventricle size is normal. Increased wall thickness. Normal wall motion. Normal diastolic function. Left Atrium Left atrium size is normal.   Right Ventricle Right ventricle size is normal. Normal systolic function. Right Atrium Right atrium size is normal.   Aortic Valve Valve structure is normal. Mild sclerosis of the aortic valve cusp. Trace regurgitation. No stenosis. Mitral Valve Mild annular calcification of the mitral valve. Mild regurgitation. No stenosis noted. Tricuspid Valve Valve structure is normal. Mild regurgitation. No stenosis noted. Mildly elevated RVSP. Pulmonic Valve Valve structure is normal. Mild regurgitation. No stenosis noted. Aorta Normal sized aorta. IVC/Hepatic Veins IVC size is normal.   Pericardium The pericardium is normal. No pericardial effusion. Hospital course:       Acute upper GI bleed POA  Hemorrhagic antral gastropathy POA  GERD POA  Acute blood loss anemia on chronic anemia (iron deficiency) POA  Chronic thrombocytopenia POA  Probable cirrhosis based on CT findings POA  CT abdomen/pelvis 05/20/22:  1. No acute intra-abdominal pathology. No evidence of bowel obstruction. 2.   Shrunken liver with questionable nodular contour. Question history of cirrhosis. Abdominal pelvic ascites is noted with splenomegaly. 3.  Cholecystectomy. Common bile duct is mildly dilated which may be physiologic  given the patient's age. Recommend correlation with serum alkaline phosphatase. 4.  Nonobstructing right renal stone. EGD 05/23/22:  1. Normal proximal and mid esophagus  2. Small, sliding hiatal hernia  3. Severe, diffuse, hemorrhagic antral gastropathy with contact bleeding. Biopsied. Hemostatic clip placed x 1 for continued oozing bleeding from biopsy site with hemostasis achieved. 4.  Stomach otherwise normal, including retroflexion  5. Normal duodenal bulb and 2nd/3rd portion of the duodenum  - GI input appreciated. - Patient received 2 unit PRBCs, total, last 5/24/2022   - Transfuse for Hgb <7.0  - Hgb now 8.5 to 10.6 x 72 hours  - Continue pantoprazole 40 mg IV q12h transition to PO at discharge  - Continue sucralfate 1 gm po qid x 10 days  - No NSAIDS  - Chronically low PLTs 90,000 to 120,000 seems to be usual baseline       Currently at baseline, suspect related to liver disease and splenomegaly       Mild decreased fibrinogen likely from liver, normal PTT       Elevated INR from liver       PO vit K x 1  - Outpatient follow up with Dr Elisa Cabrales for the bleeding and the liver    Bilateral LE edema  Hypoalbuminemia  - CXR 05/21/22:  No acute cardiopulmonary disease. Right humeral fracture. - LVEF 55-60%, normal wall thickness, normal RV size and function  - Hypoalbuminemia likely contributing to edema.  - Patient received dose of IV furosemide after first unit of blood. - Since edema mana without unilateral difference will hold on venous dopplers. - s/p albumin earlier in admit for hypotension. Suspected chronic kidney disease stage 4 POA  - CrCl 28, baseline creat 1.3 to 1.5 here  - Avoid nephrotoxins.   - Adjust meds based on CrCl. - Monitor. Hypokalemia   - Supplement. - Monitor.       Recent right humerus fraction   - CT head 05/20/22: Moderate chronic small vessel ischemic disease. No acute intracranial abnormality.  - RUE edematous, LE doppler negative  - Maintain sling.  - Elevate as able. - OP follow up with ortho on 06/03/22, per ortho patient not good candidate for surgical intervention. HTN with hypotensive episode   Dyslipidemia   PAD  - Continue carvedilol 3.125 mg po bid. - Continue atorvastatin 10 mg po daily.   - Hold pentoxifylline 2/2 GIB, will resume at discharge    Depression   Anxiety disorder  - Continue citalopram 20 mg po daily at hs. Insomnia   - Continue trazodone 25 mg po daily at hs.         25.0 - 29.9 Overweight / Body mass index is 28.91 kg/m². Code status: Full  Prophylaxis: SCDs  Recommended Disposition: TBD     Subjective:     Chief Complaint / Reason for Physician Visit  No complaints. \"no more bleeding\"  Denies abdominal pain. Main complaint is pain in the right arm  PLTs dropped this Am, unclear significance  Plan of care and pertinent events reviewed with bedside nurse. Review of Systems:  Symptom Y/N Comments  Symptom Y/N Comments   Fever/Chills N   Chest Pain N    Poor Appetite    Edema n    Cough N   Abdominal Pain N    Sputum    Joint Pain y Right arm   SOB/DAO N   Pruritis/Rash     Nausea/vomit N   Tolerating PT/OT     Diarrhea N   Tolerating Diet Y    Constipation    Other       Could NOT obtain due to:      Objective:     VITALS:   Last 24hrs VS reviewed since prior progress note.  Most recent are:  Patient Vitals for the past 24 hrs:   Temp Pulse Resp BP SpO2   05/27/22 1127 98 °F (36.7 °C) (!) 56 17 (!) 141/43 100 %   05/27/22 0821 98.1 °F (36.7 °C) (!) 55 17 (!) 144/47 98 %   05/27/22 0244 98.6 °F (37 °C) (!) 52 17 (!) 141/60 97 %   05/26/22 2326 98.6 °F (37 °C) (!) 53 18 (!) 141/61 97 %   05/26/22 2010 98.5 °F (36.9 °C) (!) 53 17 (!) 136/54 98 %   05/26/22 1522 98.9 °F (37.2 °C) (!) 54 16 (!) 147/66 98 %       Intake/Output Summary (Last 24 hours) at 5/27/2022 1156  Last data filed at 5/27/2022 0730  Gross per 24 hour   Intake 0 ml   Output 450 ml   Net -450 ml        I had a face to face encounter and independently examined this patient on 5/27/2022, as outlined below:  PHYSICAL EXAM:  General:  A/A/O.  NAD. HEENT:  Normocephalic. Sclera anicteric. Mucous membranes moist.    Chest:  Resps even/unlabored with symmetrical CWE. Air entry full. Lungs CTA. No use of accessory muscles. CV:  RRR. Normal S1/S2. No M/C/R appreciated. No JVD. RUE (fracture side) with large pitting edema. 1-2 mm pitting pretibial edema noted mana. Cap refill < 3 sec. Peripheral pulses 1+. GI:  Abdomen soft/NT/ND. ABT X 4.    :  Voiding. Neurologic:  Hughes. Face symmetrical.  Speech normal.     Psych:  Cooperative. No anxiety or agitation. No suicidal/homicidal ideation. Skin:  Warm and color appropriate. No rashes or jaundice. Turgor elastic. Reviewed most current lab test results and cultures  YES  Reviewed most current radiology test results   YES  Review and summation of old records today    NO  Reviewed patient's current orders and MAR    YES  PMH/ reviewed - no change compared to H&P  ________________________________________________________________________  Care Plan discussed with:    Comments   Patient [address]     Consultant                        Multidiciplinary team rounds were held today with , nursing, pharmacist and clinical coordinator. Patient's plan of care was discussed; medications were reviewed and discharge planning was addressed. ________________________________________________________________________  Ehsan Garcia MD     Procedures: see electronic medical records for all procedures/Xrays and details which were not copied into this note but were reviewed prior to creation of Plan. LABS:  I reviewed today's most current labs and imaging studies.   Pertinent labs include:  Recent Labs 05/27/22  1033 05/26/22  1608 05/26/22  0400   WBC 3.7 4.4 3.1*   HGB 10.6* 9.3* 9.5*   HCT 33.4* 29.8* 31.2*   PLT 89* 88* 49*     Recent Labs     05/26/22  1608 05/26/22  0700 05/25/22  0531   NA  --  143 141   K  --  4.0 4.0   CL  --  112* 110*   CO2  --  27 28   GLU  --  86 89   BUN  --  22* 25*   CREA  --  1.37* 1.42*   CA  --  8.1* 8.3*   INR 1.6*  --   --        Signed: Renee Jolly MD Spontaneous

## 2022-05-27 NOTE — DISCHARGE INSTRUCTIONS
HOSPITALIST DISCHARGE INSTRUCTIONS    NAME: Winter Luis   :  1940   MRN:  [id number]     Date/Time:  2022 11:52 AM    ADMIT DATE: 2022     DISCHARGE DATE: 2022     Attending Physician: Prachi Mayorga MD    DISCHARGE DIAGNOSIS:   Upper GI bleed      Medications: Per above medication reconciliation. Pain Management: per above medications    Recommended diet: Cardiac Diet    Recommended activity: Activity as tolerated    Wound care:     Daily wound care for the left buttocks wound:  Cleanse the wound with NS moist gauze to remove the old drainage and wound debris. Apply the Venelex ointment generously to the wound and cover with a small foam dressing. Date each dressing. Off load all pressure from this area to prevent pressure injury. Indwelling devices:  None    Supplemental Oxygen: None    Required Lab work: Weekly BMP and Weekly CBC every Monday x 2 results to covering MD    Glucose management:  None    Code status: Full        Outside physician follow up: Follow-up Information     Follow up With Specialties Details Why Contact Info    Gila Khan MD Gastroenterology Schedule an appointment as soon as possible for a visit in 4 weeks Gastroenterology. [address] [facility]  [address] [facility]  [phone]      Orthopedic outpt appointment  On 6/3/2022      Justine Bennett MD Wound Care Schedule an appointment as soon as possible for a visit in 1 week after discharge from rehab [facility]   [address]  [phone]               Skilled nursing facility/ SNF MD responsible for above on discharge. Information obtained by :  I understand that if any problems occur once I am at home I am to contact my physician.     I understand and acknowledge receipt of the instructions indicated above.                                                                                                                                            Physician's or R.N.'s Signature                                                                  Date/Time                                                                                                                                              Patient or Repres

## 2022-05-27 NOTE — PROGRESS NOTES
Problem: Falls - Risk of  Goal: *Absence of Falls  Description: Document Mauricio Sites Fall Risk and appropriate interventions in the flowsheet. Outcome: Progressing Towards Goal  Note: Fall Risk Interventions:  Mobility Interventions: Communicate number of staff needed for ambulation/transfer         Medication Interventions: Patient to call before getting OOB    Elimination Interventions: Call light in reach    History of Falls Interventions: Room close to nurse's station         Problem: Patient Education: Go to Patient Education Activity  Goal: Patient/Family Education  Outcome: Progressing Towards Goal     Problem: Pressure Injury - Risk of  Goal: *Prevention of pressure injury  Description: Document Zachary Scale and appropriate interventions in the flowsheet.   Outcome: Progressing Towards Goal  Note: Pressure Injury Interventions:  Sensory Interventions: Assess changes in LOC    Moisture Interventions: Minimize layers    Activity Interventions: Increase time out of bed    Mobility Interventions: HOB 30 degrees or less    Nutrition Interventions: Document food/fluid/supplement intake    Friction and Shear Interventions: HOB 30 degrees or less                Problem: Patient Education: Go to Patient Education Activity  Goal: Patient/Family Education  Outcome: Progressing Towards Goal

## 2022-05-27 NOTE — PROGRESS NOTES
[facility] to [address] [place] [address] Dr ALLISON Rodrigues                                                                        78 y.o.   female    [address]   Room: [facility] 3 SURG TELE  Unit Phone# :  [phone]      [address]  [facility] [address] [place] [place] [address]  Dept: [phone]  Loc: [phone]                    SITUATION     Admitted:  5/20/2022         Attending Provider:  Valeria Brenner MD       Consultations:  IP CONSULT TO HOSPITALIST  IP CONSULT TO GASTROENTEROLOGY    PCP:  China Tan MD   [phone]    Treatment Team: Attending Provider: Valeria Brnener MD; Consulting Provider: Kimberly Go MD; Consulting Provider: Susan Theodore NP; Nurse Practitioner: Edward Montiel NP; Utilization Review: Francisco Lemos RN; Care Manager: Loi Ren RN; Student Nurse: Beryl Purdy; Physical Therapist: Alfredo José PT; Primary Nurse: Kirsten Villegas, ISAAK; Occupational Therapist: Nael Us, OTR/L    Admitting Dx:  Acute GI bleeding [K92.2]  Symptomatic anemia [D64.9]       Principal Problem: <principal problem not specified>    4 Days Post-Op of   Procedure(s):  ESOPHAGOGASTRODUODENOSCOPY (EGD)  ESOPHAGOGASTRODUODENAL (EGD) BIOPSY  RESOLUTION CLIP   BY: Madeline Robles MD             ON: 5/23/2022                  Code Status: Full Code                Advance Directives:   Advance Care Planning 5/25/2022   Confirm Advance Directive None   Does the patient have other document types -    (Send w/patient)   Yes Not W Pt       Isolation:  There are currently no Active Isolations       MDRO: No current active infections    Pain Medications given:  N/A    Last dose: N/A at  N/A    Special Equipment needed: yes  Type of equipment: Walker/ BSC         BACKGROUND     Allergies:   Allergies   Allergen Reactions    Aspirin Hives    Codeine Hives and Nausea and Vomiting       Past Medical History: Diagnosis Date    Chronic pain     hip, back    Depression     Diabetes ([address]     GERD (gastroesophageal reflux disease)     Hemorrhagic gastritis     10/2013    [place] (hard of hearing)     no hearing aides    Hyperlipemia     Hypertension     Stroke (cerebrum) ([address]     no residual    Stroke ([address]     No residual says patient       Past Surgical History:   Procedure Laterality Date    HX  SECTION  1979    HX CHOLECYSTECTOMY      IR KYPHOPLASTY LUMBAR  10/19/2020    IR KYPHOPLASTY LUMBAR  10/19/2020    VT COLONOSCOPY FLX DX W/COLLJ SPEC WHEN PFRMD  2013         VT ESOPHAGOGASTRODUODENOSCOPY TRANSORAL DIAGNOSTIC  10/10/2013            Medications Prior to Admission   Medication Sig    traMADoL (ULTRAM) 50 mg tablet Take 50 mg by mouth every eight (8) hours as needed for Pain.  predniSONE (STERAPRED DS) 10 mg dose pack Standard 6 day taper    methocarbamoL (Robaxin) 500 mg tablet Take 1 Tablet by mouth four (4) times daily as needed for Muscle Spasm(s).  amLODIPine (NORVASC) 10 mg tablet Take 1 Tab by mouth daily.  folic acid (FOLVITE) 083 mcg tablet Take 800 mcg by mouth daily.  magnesium 250 mg tab Take 250 mg by mouth daily (after dinner).  multivitamin (ONE A DAY) tablet Take 1 Tab by mouth daily.  carvediloL (Coreg) 3.125 mg tablet Take 3.125 mg by mouth two (2) times daily (with meals).  meclizine (ANTIVERT) 12.5 mg tablet Take 6.25 mg by mouth three (3) times daily as needed.  ascorbic acid, vitamin C, (VITAMIN C) 500 mg tablet Take 500 mg by mouth daily.  ferrous sulfate (IRON) 325 mg (65 mg iron) EC tablet Take 325 mg by mouth Daily (before breakfast).  traZODone (DESYREL) 50 mg tablet Take 25 mg by mouth nightly.  pentoxifylline CR (TRENTAL) 400 mg CR tablet Take 400 mg by mouth two (2) times a day.  simvastatin (ZOCOR) 20 mg tablet Take 20 mg by mouth nightly.     fenofibrate nanocrystallized (TRICOR) 145 mg tablet Take 145 mg by mouth nightly.  citalopram (CELEXA) 20 mg tablet Take 20 mg by mouth every evening. Hard scripts included in transfer packet no    Vaccinations:    Immunization History   Administered Date(s) Administered    Influenza Vaccine 10/08/2013    Pneumococcal Vaccine (Unspecified Type) 01/01/2008       Readmission Risks:    Known Risks: The Charlson CoMorbitiy Index tool is an evidenced based tool that has more automatic generated information. The tool looks at many different items such as the age of the patient, how many times they were admitted in the last calendar year, current length of stay in the hospital and their diagnosis. All of these items are pulled automatically from information documented in the chart from various places and will generate a score that predicts whether a patient is at low (less than 13), medium (13-20) or high (21 or greater) risk of being readmitted.         ASSESSMENT                Temp: 98.1 °F (36.7 °C) (05/27/22 0821) Pulse (Heart Rate): (!) 55 (05/27/22 0821)     Resp Rate: 17 (05/27/22 0821)           BP: (!) 144/47 (05/27/22 0821)     O2 Sat (%): 98 % (05/27/22 0821)     Weight: 71.7 kg (158 lb 1.1 oz)    Height: 5' 2\" (157.5 cm) (05/25/22 1442)       If above not within 1 hour of discharge:    BP:_____  P:____  R:____ T:_____ O2 Sat: ___%  O2: ______    Active Orders   Diet    ADULT DIET Regular; GI [place] (GERD/Peptic Ulcer)         Orientation: oriented to time, place, person and situation     Active Behaviors: None                                   Active Lines/Drains:  (Peg Tube / Munguia / CL or S/L?): no    Urinary Status: Voiding,External catheter     Last BM: Last Bowel Movement Date: 05/27/22     Skin Integrity: Excoriation             Mobility: Slightly limited   Weight Bearing Status: WBAT (Weight Bearing as Tolerated)      Gait Training  Assistive Device: Gait belt (HHA)  Ambulation - Level of Assistance: Minimal assistance,Assist x2  Distance (ft): 5 Feet (ft)         Lab Results   Component Value Date/Time    Glucose 86 05/26/2022 07:00 AM    Hemoglobin A1c 7.7 (H) 08/10/2013 05:00 AM    INR 1.6 (H) 05/26/2022 04:08 PM    INR 1.3 (H) 05/20/2022 01:07 PM    HGB 9.3 (L) 05/26/2022 04:08 PM    HGB 9.5 (L) 05/26/2022 04:00 AM        RECOMMENDATION     See After Visit Summary (AVS) for:  · Discharge instructions  · After [address]   · Special equipment needed (entered pre-discharge by Care Management)  · Medication Reconciliation    · Follow up Appointment(s)         Report given/sent by:  Mimi Hernandez RN                    Verbal report given to: Deshawn Blanton RN  FAXED to:    [phone]       Estimated discharge time:  5/27/2022 at 1200

## 2022-05-27 NOTE — PROGRESS NOTES
[address] [place] [address]  RN to call report to: [phone] Room #: 100  Fax#: [phone]     [facility]: 12:00 pm on 5/27.      Transition of Care Plan:     RUR: 14% low risk  Disposition: SNF- [facility]   Follow up appointments:  To be arranged by SNF  DME needed: to be provided by SNF  Transportation at [address] [place] [address] - 12:00 pm  Keys or means to access home: family has access       IM Medicare Letter:  2nd IM letter - received and signed 5/26. Is patient a BCPI-A Bundle: No               If yes, was Bundle Letter given?: N/A   Is patient a  and connected with the [address] [place] [address]  If yes, was [employer] transfer form completed and [facility] notified? Caregiver Contact: Daughter, Christopher Kay ([phone])  Discharge Caregiver contacted prior to discharge? yes  Care Conference needed?:  Not at this time    Updated Note 12:30 pm: CM contacted dtr and updated. CM updated pt. CM faxed MD discharge summary and COVID test results to [address] [place] [address] at [phone]. Updated Note 10:45 am: RN updated CM stating that she is waiting for lab results. CM updated RN on plan (if labs okay). Initial Note 09:45 am: CM received phone call from [address] [place] [address]). CM updated that the plan is discharge and [facility] 12:00 pm. CM sent message to Dr. Darlene Primrose to update. Transition of Care Plan to SNF/Rehab    SNF/Rehab Transition:  Patient has been accepted to [address] [place] [address] and meets criteria for admission. Patient will transported by [facility] [place] and expected to leave at 12:00 pm.    Communication to Patient/Family:  Met with patient and dtr Christopher Kay (identified care giver) and they are agreeable to the transition plan. Communication to SNF/Rehab:  Bedside RN, Axel Alexander, has been notified to update the transition plan to the facility and call report (phone number [phone]). Discharge information has been updated on the AVS.     Discharge instructions to be fax'd to facility at [facility] # [phone]).      Nursing Please include all hard scripts for controlled substances, med rec and dc summary, and AVS in packet. Reviewed and confirmed with facility, [facility] (admissions) from [facility], can manage the patient care needs for the following:     Herlinda Davies with (X) only those applicable:    Medication:  [x]  Medications will be available at the facility  []  IV Antibiotics   []  Controlled Substance - hard copy to be sent with patient   []  Weekly Labs   Documents:  [] Hard RX  [x] MAR  [x] Kardex  [x] AVS  [x]Transfer Summary  [x]Discharge   Equipment:  []  CPAP/BiPAP  []  Wound Vacuum  []  Munguia or Urinary Device  []  PICC/Central Line  []  Nebulizer  []  Ventilator   Treatment:  []Isolation (for MRSA, VRE, etc.)  []Surgical Drain Management  []Tracheostomy Care  [x]Dressing Changes    Daily wound care for the left buttocks wound:  Cleanse the wound with NS moist gauze to remove the old drainage and wound debris. Apply the Venelex ointment generously to the wound and cover with a small foam dressing. Date each dressing. Off load all pressure from this area to prevent pressure injury. []Dialysis with transportation and chair time . []PEG Care  []Oxygen  []Daily Weights for Heart Failure   Dietary:  []Any diet limitations  []Tube Feedings   []Total Parenteral Management (TPN)   Eligible for Medicaid Long Term Services and Supports  Yes:  [] Eligible for medical assistance or will become eligible within 180 days and UAI completed. [] Provider/Patient and/or support system has requested screening. [] UAI copy provided to patient or responsible party, .  [] UAI unavailable at discharge will send once processed to SNF provider. [] UAI unavailable at discharged mailed to patient  No:   [] Private pay and is not financially eligible for Medicaid within the next 180 days. [] Reside out-of-state.   [] A residents of a state owned/operated facility that is licensed  by [facility] and Developmental Services or [facility]  [] Enrollment in [facility] hospice services  [] Non [place] citizen  [] Patient /Family declines to have screening completed or provide financial information for screening     Financial Resources:  Medicaid    [] Initiated and application pending   [] Full coverage     Advanced Care Plan:  []Surrogate Decision Maker of Care  []POA  []Communicated Code Status Full (DDNR\", \"Full\")    Other     Daughter, Dayanara Mcdaniels ([phone])       Care Management Interventions  PCP Verified by CM: Yes  Last Visit to PCP: 05/19/22  Palliative Care Criteria Met (RRAT>21 & CHF Dx)?: No (No MD order)  Mode of Transport at Discharge: [address]  [address] Time of Discharge: 1200  Transition of Care Consult (CM Consult): SNF  Partner SNF: No  Reason Why Partner SNF Not Chosen: Location  Discharge Durable Medical Equipment: No  Physical Therapy Consult: Yes  Occupational Therapy Consult: Yes  Speech Therapy Consult: No  Support Systems: Child(diane)  Confirm Follow Up Transport: Family  The Plan for Transition of Care is Related to the Following Treatment Goals : SNF  The Patient and/or Patient Representative was Provided with a Choice of Provider and Agrees with the Discharge Plan?: Yes  Name of the Patient Representative Who was Provided with a Choice of Provider and Agrees with the Discharge Plan: patient and dtr  Freedom of Choice List was Provided with Basic Dialogue that Supports the Patient's Individualized Plan of Care/Goals, Treatment Preferences and Shares the Quality Data Associated with the Providers?: Yes  Discharge Location  Patient Expects to be Discharged to[de-identified] Skilled nursing facility     Unit CM will continue to follow.      Guerda Mason RN, BSN, [address] [place] [address] Care Manager  [phone]

## 2022-05-27 NOTE — DISCHARGE SUMMARY
Hospitalist Progress Note    NAME: Sid Iglesias   :  1940   MRN:  [id number]       Admit date: 2022    Discharge date: 22    PCP: Sydni Virk MD    Discharge Diagnoses:    Acute upper GI bleed POA    Hemorrhagic antral gastropathy POA    GERD POA    Acute blood loss anemia on chronic anemia (iron deficiency) POA s/p 2 units pRBCs    Chronic thrombocytopenia POA    Probable cirrhosis based on CT findings POA    Bilateral LE edema    Hypoalbuminemia    Suspected chronic kidney disease stage 4 POA    Hypokalemia     Recent right humerus fracture wearing sling    HTN with hypotensive episode at admit, now resolved    Dyslipidemia     PAD    Depression     Anxiety disorder    Insomnia      Overweight POA  Body mass index is 28.91 kg/m². Code status: Full    Discharge Medications:  Current Discharge Medication List      START taking these medications    Details   pantoprazole (PROTONIX) 40 mg tablet Take 1 Tablet by mouth Before breakfast and dinner for 30 days. Qty: 60 Tablet, Refills: 2      sucralfate (CARAFATE) 1 gram tablet Take 1 Tablet by mouth Before breakfast, lunch, dinner and at bedtime for 10 days. Qty: 40 Tablet, Refills: 0         CONTINUE these medications which have CHANGED    Details   traMADoL (ULTRAM) 50 mg tablet Take 1 Tablet by mouth every eight (8) hours as needed for Pain for up to 3 days. Max Daily Amount: 150 mg.  Qty: 6 Tablet, Refills: 0    Associated Diagnoses: Closed fracture of first lumbar vertebra with routine healing, unspecified fracture morphology, subsequent encounter         CONTINUE these medications which have NOT CHANGED    Details   folic acid (FOLVITE) 667 mcg tablet Take 800 mcg by mouth daily. magnesium 250 mg tab Take 250 mg by mouth daily (after dinner). multivitamin (ONE A DAY) tablet Take 1 Tab by mouth daily. carvediloL (Coreg) 3.125 mg tablet Take 3.125 mg by mouth two (2) times daily (with meals).       meclizine (ANTIVERT) 12.5 mg tablet Take 6.25 mg by mouth three (3) times daily as needed. ascorbic acid, vitamin C, (VITAMIN C) 500 mg tablet Take 500 mg by mouth daily. ferrous sulfate (IRON) 325 mg (65 mg iron) EC tablet Take 325 mg by mouth Daily (before breakfast). traZODone (DESYREL) 50 mg tablet Take 25 mg by mouth nightly. pentoxifylline CR (TRENTAL) 400 mg CR tablet Take 400 mg by mouth two (2) times a day. simvastatin (ZOCOR) 20 mg tablet Take 20 mg by mouth nightly. fenofibrate nanocrystallized (TRICOR) 145 mg tablet Take 145 mg by mouth nightly. citalopram (CELEXA) 20 mg tablet Take 20 mg by mouth every evening. STOP taking these medications       predniSONE (STERAPRED DS) 10 mg dose pack Comments:   Reason for Stopping:         methocarbamoL (Robaxin) 500 mg tablet Comments:   Reason for Stopping:         amLODIPine (NORVASC) 10 mg tablet Comments:   Reason for Stopping: Follow-up Information     Follow up With Specialties Details Why Contact Info    Ban Marley MD Gastroenterology Schedule an appointment as soon as possible for a visit in 4 weeks Gastroenterology.   [address]  [address] [facility]  [phone]      Orthopedic outpt appointment  On 6/3/2022      Shakira Bernardo MD Wound Care Schedule an appointment as soon as possible for a visit in 1 week after discharge from rehab [address] , [address]   [address]  [phone]          Time spent on discharge:   I spent greater than 30 minutes on discharge, seeing and examining the patient, reconciling home meds and new meds, coordinating care with case management, doing the discharge papers and the D/C summary    Discharge disposition: SNF    Discharge Condition: Stable    Summary of admission H+P(copied from Dr Donahue Guardian Note):     CHIEF COMPLAINT: Abnormal labs, nausea and vomiting     HISTORY OF PRESENT ILLNESS:     Elina Bess is a 78 y.o.  female who presents with abnormal labs with low hemoglobin along with nausea and vomiting. Patient past medical history of iron deficiency anemia, hypertension, anxiety disorder, insomnia, hyperlipidemia. Patient had a fall few days ago and got a right humerus fracture. Patient had a follow-up appointment with PCP yesterday and got a lab work done. Last night patient got a call from the primary care doctor that hemoglobin is low and she needs to come to the hospital for evaluation. Currently the patient is doing fine had no chest pain or shortness of breath has on and off nausea with vomiting-by looking according to the daughter. No fever or chills, no headache, no dizziness, no passing out episodes. Patient is swelling the lower legs and has been on stockings.     We were asked to admit for work up and evaluation of the above problems.           Past Medical History:   Diagnosis Date    Chronic pain       hip, back    Depression      Diabetes ([address]      GERD (gastroesophageal reflux disease)      Hemorrhagic gastritis       10/2013    [place] (hard of hearing)       no hearing aides    Hyperlipemia      Hypertension      Stroke (cerebrum) ([address]      no residual    Stroke (HCC)      No residual says patient               Past Surgical History:   Procedure Laterality Date    HX  SECTION       HX CHOLECYSTECTOMY        IR KYPHOPLASTY LUMBAR   10/19/2020    IR KYPHOPLASTY LUMBAR   10/19/2020    [facility] COLONOSCOPY FLX DX W/COLLJ SPEC WHEN PFRMD   2013          VA ESOPHAGOGASTRODUODENOSCOPY TRANSORAL DIAGNOSTIC   10/10/2013           pCXR read by radiology FINDINGS:  The lungs appear clear. Heart is normal in size. There is no pulmonary edema. There is no evident pneumothorax or pleural effusion.  There is right humeral  neck fracture  IMPRESSION  No acute cardiopulmonary disease. Right humeral fracture. Head CT read by radiology FINDINGS:  The ventricles and sulci are normal in size, shape and configuration. . There is  no significant white matter disease. There is no intracranial hemorrhage,  extra-axial collection, or mass effect. The basilar cisterns are open. No CT  evidence of acute infarct. The bone windows demonstrate no abnormalities. The visualized portions of the  paranasal sinuses and mastoid air cells are clear. IMPRESSION  Moderate chronic small vessel ischemic disease. No acute intracranial  Abnormality    CT abdomen/pelvis read by radiology FINDINGS:   LOWER THORAX: No significant abnormality in the incidentally imaged lower chest.  LIVER: Relatively small. Question nodular contour  BILIARY TREE: Cholecystectomy. Common bile duct is dilated to 13 mm. SPLEEN: Mildly enlarged measuring 14 cm  PANCREAS: No focal abnormality. ADRENALS: Unremarkable. KIDNEYS/URETERS: Nonobstructing right renal stone. STOMACH: Unremarkable. SMALL BOWEL: No dilatation or wall thickening. COLON: No dilatation or wall thickening. APPENDIX: Not visualized  PERITONEUM: Abdominal and pelvic ascites. RETROPERITONEUM: Atherosclerotic disease  REPRODUCTIVE ORGANS: Unremarkable  URINARY BLADDER: No mass or calculus. BONES: No destructive bone lesion. ABDOMINAL WALL: Diffuse subcutaneous edema. ADDITIONAL COMMENTS: N/A  IMPRESSION  1. No acute intra-abdominal pathology. No evidence of bowel obstruction. 2.   Shrunken liver with questionable nodular contour. Question history of  cirrhosis. Abdominal pelvic ascites is noted with splenomegaly. 3.  Cholecystectomy. Common bile duct is mildly dilated which may be physiologic  given the patient's age. Recommend correlation with serum alkaline phosphatase. 4.  Nonobstructing right renal stone    Right upper extremity doppler 5/26   Right Upper Venous  No evidence of acute DVT. The internal jugular, subclavian, axillary, brachial prox, brachial mid, brachial dist, radial, ulnar, cephalic upper arm and basilic upper arm vein(s) were visualized in the transverse and longitudinal views. The vessels showed normal color filling and compressibility. Doppler interrogation showed phasic and spontaneous flow. Left Upper Venous  For comparison purposes, the left subclavian/internal jugular veins were investigated. These veins appeared to show normal color filling and Doppler interrogation showed phasic, spontaneous and somewhat pulsatile flow. Echo TTE read by cardiology   Left Ventricle Normal left ventricular systolic function with a visually estimated EF of 55 - 60%. Left ventricle size is normal. Increased wall thickness. Normal wall motion. Normal diastolic function. Left Atrium Left atrium size is normal.   Right Ventricle Right ventricle size is normal. Normal systolic function. Right Atrium Right atrium size is normal.   Aortic Valve Valve structure is normal. Mild sclerosis of the aortic valve cusp. Trace regurgitation. No stenosis. Mitral Valve Mild annular calcification of the mitral valve. Mild regurgitation. No stenosis noted. Tricuspid Valve Valve structure is normal. Mild regurgitation. No stenosis noted. Mildly elevated RVSP. Pulmonic Valve Valve structure is normal. Mild regurgitation. No stenosis noted. Aorta Normal sized aorta. IVC/Hepatic Veins IVC size is normal.   Pericardium The pericardium is normal. No pericardial effusion. Hospital course:       Acute upper GI bleed POA  Hemorrhagic antral gastropathy POA  GERD POA  Acute blood loss anemia on chronic anemia (iron deficiency) POA  Chronic thrombocytopenia POA  Probable cirrhosis based on CT findings POA  CT abdomen/pelvis 05/20/22:  1. No acute intra-abdominal pathology. No evidence of bowel obstruction. 2.   Shrunken liver with questionable nodular contour. Question history of cirrhosis. Abdominal pelvic ascites is noted with splenomegaly. 3.  Cholecystectomy. Common bile duct is mildly dilated which may be physiologic  given the patient's age. Recommend correlation with serum alkaline phosphatase. 4.  Nonobstructing right renal stone. EGD 05/23/22:  1. Normal proximal and mid esophagus  2. Small, sliding hiatal hernia  3. Severe, diffuse, hemorrhagic antral gastropathy with contact bleeding. Biopsied. Hemostatic clip placed x 1 for continued oozing bleeding from biopsy site with hemostasis achieved. 4.  Stomach otherwise normal, including retroflexion  5. Normal duodenal bulb and 2nd/3rd portion of the duodenum  - GI input appreciated. - Patient received 2 unit PRBCs, total, last 5/24/2022   - Transfuse for Hgb <7.0  - Hgb now 8.5 to 10.6 x 72 hours  - Continue pantoprazole 40 mg IV q12h transition to PO at discharge  - Continue sucralfate 1 gm po qid x 10 days  - No NSAIDS  - Chronically low PLTs 90,000 to 120,000 seems to be usual baseline       Currently at baseline, suspect related to liver disease and splenomegaly       Mild decreased fibrinogen likely from liver, normal PTT       Elevated INR from liver       PO vit K x 1  - Outpatient follow up with Dr Kassy Judge for the bleeding and the liver    Bilateral LE edema ? cirrhosis  Hypoalbuminemia  - CXR 05/21/22:  No acute cardiopulmonary disease. Right humeral fracture. - LVEF 55-60%, normal wall thickness, normal RV size and function  - Hypoalbuminemia likely contributing to edema.  - Patient received dose of IV furosemide after first unit of blood. - Since edema mana without unilateral difference will hold on venous dopplers. - s/p albumin earlier in admit for hypotension.  - Stop norvasc with the LE edema  - add lasix as needed    Suspected chronic kidney disease stage 4 POA  - CrCl 28, baseline creat 1.3 to 1.5 here  - Avoid nephrotoxins.   - Adjust meds based on CrCl. - Monitor. Hypokalemia   - Supplement.   - Monitor. Recent right humerus fraction   - CT head 05/20/22: Moderate chronic small vessel ischemic disease. No acute intracranial abnormality.  - RUE edematous, LE doppler negative  - Maintain sling.  - Elevate as able. - OP follow up with ortho on 06/03/22, per ortho patient not good candidate for surgical intervention. HTN with hypotensive episode   Dyslipidemia   PAD  - Continue carvedilol 3.125 mg po bid.   - Holding norvasc with LE edema, add Po hydralazine as needed  - Continue atorvastatin 10 mg po daily.   - Hold pentoxifylline 2/2 GIB, will resume at discharge    Depression   Anxiety disorder  - Continue citalopram 20 mg po daily at hs. Insomnia   - Continue trazodone 25 mg po daily at hs.         25.0 - 29.9 Overweight / Body mass index is 28.91 kg/m². Code status: Full  Prophylaxis: SCDs  Recommended Disposition: TBD     Subjective:     Chief Complaint / Reason for Physician Visit  No complaints. \"no more bleeding\"  Denies abdominal pain. Main complaint is pain in the right arm  PLTs dropped this Am, unclear significance  Plan of care and pertinent events reviewed with bedside nurse. Review of Systems:  Symptom Y/N Comments  Symptom Y/N Comments   Fever/Chills N   Chest Pain N    Poor Appetite    Edema n    Cough N   Abdominal Pain N    Sputum    Joint Pain y Right arm   SOB/DAO N   Pruritis/Rash     Nausea/vomit N   Tolerating PT/OT     Diarrhea N   Tolerating Diet Y    Constipation    Other       Could NOT obtain due to:      Objective:     VITALS:   Last 24hrs VS reviewed since prior progress note.  Most recent are:  Patient Vitals for the past 24 hrs:   Temp Pulse Resp BP SpO2   05/27/22 1127 98 °F (36.7 °C) (!) 56 17 (!) 141/43 100 %   05/27/22 0821 98.1 °F (36.7 °C) (!) 55 17 (!) 144/47 98 %   05/27/22 0244 98.6 °F (37 °C) (!) 52 17 (!) 141/60 97 %   05/26/22 2326 98.6 °F (37 °C) (!) 53 18 (!) 141/61 97 %   05/26/22 2010 98.5 °F (36.9 °C) (!) 53 17 (!) 136/54 98 % 05/26/22 1522 98.9 °F (37.2 °C) (!) 54 16 (!) 147/66 98 %       Intake/Output Summary (Last 24 hours) at 5/27/2022 1207  Last data filed at 5/27/2022 0730  Gross per 24 hour   Intake 0 ml   Output 450 ml   Net -450 ml        I had a face to face encounter and independently examined this patient on 5/27/2022, as outlined below:  PHYSICAL EXAM:  General:  A/A/O.  NAD. HEENT:  Normocephalic. Sclera anicteric. Mucous membranes moist.    Chest:  Resps even/unlabored with symmetrical CWE. Air entry full. Lungs CTA. No use of accessory muscles. CV:  RRR. Normal S1/S2. No M/C/R appreciated. No JVD. RUE (fracture side) with large pitting edema. 1-2 mm pitting pretibial edema noted mana. Cap refill < 3 sec. Peripheral pulses 1+. GI:  Abdomen soft/NT/ND. ABT X 4.    :  Voiding. Neurologic:  [place]. Face symmetrical.  Speech normal.     Psych:  Cooperative. No anxiety or agitation. No suicidal/homicidal ideation. Skin:  Warm and color appropriate. No rashes or jaundice. Turgor elastic. Reviewed most current lab test results and cultures  YES  Reviewed most current radiology test results   YES  Review and summation of old records today    NO  Reviewed patient's current orders and MAR    YES  PMH/ reviewed - no change compared to H&P  ________________________________________________________________________  Care Plan discussed with:    Comments   [address] [place] [address]     Consultant                        Multidiciplinary team rounds were held today with , nursing, pharmacist and clinical coordinator. Patient's plan of care was discussed; medications were reviewed and discharge planning was addressed.      ________________________________________________________________________  Juanito Caceres MD     Procedures: see electronic medical records for all procedures/Xrays and details which were not copied into this note but were reviewed prior to creation of Plan. LABS:  I reviewed today's most current labs and imaging studies.   Pertinent labs include:  Recent Labs     05/27/22  1033 05/26/22  1608 05/26/22  0400   WBC 3.7 4.4 3.1*   HGB 10.6* 9.3* 9.5*   HCT 33.4* 29.8* 31.2*   PLT 89* 88* 49*     Recent Labs     05/26/22  1608 05/26/22  0700 05/25/22  0531   NA  --  143 141   K  --  4.0 4.0   CL  --  112* 110*   CO2  --  27 28   GLU  --  86 89   BUN  --  22* 25*   CREA  --  1.37* 1.42*   CA  --  8.1* 8.3*   INR 1.6*  --   --        Signed: Dandy Garcia MD

## 2022-05-30 LAB
ATRIAL RATE: 108 BPM
ATRIAL RATE: 27 BPM
CALCULATED R AXIS, ECG10: 1 DEGREES
CALCULATED R AXIS, ECG10: 4 DEGREES
CALCULATED T AXIS, ECG11: -14 DEGREES
CALCULATED T AXIS, ECG11: 6 DEGREES
DIAGNOSIS, 93000: NORMAL
DIAGNOSIS, 93000: NORMAL
Q-T INTERVAL, ECG07: 510 MS
Q-T INTERVAL, ECG07: 554 MS
QRS DURATION, ECG06: 86 MS
QRS DURATION, ECG06: 86 MS
QTC CALCULATION (BEZET), ECG08: 445 MS
QTC CALCULATION (BEZET), ECG08: 487 MS
VENTRICULAR RATE, ECG03: 39 BPM
VENTRICULAR RATE, ECG03: 55 BPM

## 2022-06-09 NOTE — PROGRESS NOTES
Physician Progress Note      Remberto Duggan  CSN #:                  [id number]  :                       1940  ADMIT DATE:       2022 12:26 PM  Jim Ventura DATE:        2022 1:46 PM  RESPONDING  PROVIDER #:        Verna Adrian MD          QUERY TEXT:    Patient admitted with GI bleeding,  GIB. EGD noted, \"Severe, diffuse, hemorrhagic antral gastropathy with contact bleeding. Biopsied. Hemostatic clip placed x 1 for continued oozing bleeding from biopsy site with hemostasis achieved. \" Progress notes mentioned, \"Acute on chronic anemia, hemorrhagic gastritis. \"  After study, can the cause of the bleeding be further specified as: The medical record reflects the following:  Risk Factors: 81yoF w/ hx of hemorrhagic gastritis and antral gastropathy with bleeding  Clinical Indicators: of and off nausea with vomiting, weak, pale, decreased appetite, Black stools and hemoccult +, H/H 5.9/20.5  EGD noted, \"Severe, diffuse, hemorrhagic antral gastropathy with contact bleeding. Biopsied. Hemostatic clip placed x 1 for continued oozing bleeding from biopsy site with hemostasis achieved. \" Progress notes mentioned, \"Acute on chronic anemia, hemorrhagic gastritis. \"  Treatment: Gi Consutl, EGD with control of bleeding, PRBC transfusions. monitor H/H. Thank you,  Jose Santos Rn, CDI  Options provided:  -- Gi bleed due Gastritis with bleeding  -- GI Bleed due Erythematous gastropathy with bleeding  -- Gastropathy unable to further specify  -- GI bleed due to both gastropathy and gastritis  -- Other - I will add my own diagnosis  -- Disagree - Not applicable / Not valid  -- Disagree - Clinically unable to determine / Unknown  -- Refer to Clinical Documentation Reviewer    PROVIDER RESPONSE TEXT:    This patient has GI Bleed due Erythematous gastropathy with bleeding.     Query created by: Stoney Hathaway on 2022 1:46 PM      Electronically signed by:  Verna Adrian MD 2022 4:20 PM

## 2022-07-20 ENCOUNTER — HOSPITAL ENCOUNTER (INPATIENT)
Age: 82
LOS: 9 days | Discharge: NURSING, REHAB OR LONG-TERM CARE | DRG: 377 | End: 2022-07-29
Attending: INTERNAL MEDICINE | Admitting: INTERNAL MEDICINE
Payer: MEDICARE

## 2022-07-20 PROBLEM — K92.2 GI BLEED: Status: ACTIVE | Noted: 2022-07-20

## 2022-07-20 PROBLEM — D64.9 SEVERE ANEMIA: Status: ACTIVE | Noted: 2022-07-20

## 2022-07-20 PROCEDURE — 36415 COLL VENOUS BLD VENIPUNCTURE: CPT

## 2022-07-20 PROCEDURE — 80053 COMPREHEN METABOLIC PANEL: CPT

## 2022-07-20 PROCEDURE — 85027 COMPLETE CBC AUTOMATED: CPT

## 2022-07-20 PROCEDURE — 65270000046 HC RM TELEMETRY

## 2022-07-20 RX ORDER — POLYETHYLENE GLYCOL 3350 17 G/17G
17 POWDER, FOR SOLUTION ORAL DAILY PRN
Status: DISCONTINUED | OUTPATIENT
Start: 2022-07-20 | End: 2022-07-29 | Stop reason: HOSPADM

## 2022-07-20 RX ORDER — LANOLIN ALCOHOL/MO/W.PET/CERES
1 CREAM (GRAM) TOPICAL
Status: DISCONTINUED | OUTPATIENT
Start: 2022-07-21 | End: 2022-07-29 | Stop reason: HOSPADM

## 2022-07-20 RX ORDER — CARVEDILOL 3.12 MG/1
3.12 TABLET ORAL 2 TIMES DAILY WITH MEALS
Status: DISCONTINUED | OUTPATIENT
Start: 2022-07-21 | End: 2022-07-29 | Stop reason: HOSPADM

## 2022-07-20 RX ORDER — TRAZODONE HYDROCHLORIDE 50 MG/1
25 TABLET ORAL
Status: DISCONTINUED | OUTPATIENT
Start: 2022-07-20 | End: 2022-07-29 | Stop reason: HOSPADM

## 2022-07-20 RX ORDER — ACETAMINOPHEN 650 MG/1
650 SUPPOSITORY RECTAL
Status: DISCONTINUED | OUTPATIENT
Start: 2022-07-20 | End: 2022-07-29 | Stop reason: HOSPADM

## 2022-07-20 RX ORDER — ACETAMINOPHEN 325 MG/1
650 TABLET ORAL
Status: DISCONTINUED | OUTPATIENT
Start: 2022-07-20 | End: 2022-07-29 | Stop reason: HOSPADM

## 2022-07-20 RX ORDER — ATORVASTATIN CALCIUM 10 MG/1
10 TABLET, FILM COATED ORAL
Status: DISCONTINUED | OUTPATIENT
Start: 2022-07-20 | End: 2022-07-29 | Stop reason: HOSPADM

## 2022-07-20 RX ORDER — LANOLIN ALCOHOL/MO/W.PET/CERES
400 CREAM (GRAM) TOPICAL DAILY
Status: DISCONTINUED | OUTPATIENT
Start: 2022-07-21 | End: 2022-07-29 | Stop reason: HOSPADM

## 2022-07-20 RX ORDER — ONDANSETRON 2 MG/ML
4 INJECTION INTRAMUSCULAR; INTRAVENOUS
Status: DISCONTINUED | OUTPATIENT
Start: 2022-07-20 | End: 2022-07-29 | Stop reason: HOSPADM

## 2022-07-20 RX ORDER — CITALOPRAM 20 MG/1
20 TABLET, FILM COATED ORAL EVERY EVENING
Status: DISCONTINUED | OUTPATIENT
Start: 2022-07-21 | End: 2022-07-29 | Stop reason: HOSPADM

## 2022-07-20 RX ORDER — SODIUM CHLORIDE 0.9 % (FLUSH) 0.9 %
5-40 SYRINGE (ML) INJECTION AS NEEDED
Status: DISCONTINUED | OUTPATIENT
Start: 2022-07-20 | End: 2022-07-21

## 2022-07-20 RX ORDER — ONDANSETRON 4 MG/1
4 TABLET, ORALLY DISINTEGRATING ORAL
Status: DISCONTINUED | OUTPATIENT
Start: 2022-07-20 | End: 2022-07-29 | Stop reason: HOSPADM

## 2022-07-20 RX ORDER — SODIUM CHLORIDE 0.9 % (FLUSH) 0.9 %
5-40 SYRINGE (ML) INJECTION EVERY 8 HOURS
Status: DISCONTINUED | OUTPATIENT
Start: 2022-07-20 | End: 2022-07-29 | Stop reason: HOSPADM

## 2022-07-20 RX ORDER — FENOFIBRATE 145 MG/1
145 TABLET, COATED ORAL
Status: DISCONTINUED | OUTPATIENT
Start: 2022-07-21 | End: 2022-07-25

## 2022-07-21 ENCOUNTER — ANESTHESIA EVENT (OUTPATIENT)
Dept: ENDOSCOPY | Age: 82
DRG: 377 | End: 2022-07-21
Payer: MEDICARE

## 2022-07-21 ENCOUNTER — ANESTHESIA (OUTPATIENT)
Dept: ENDOSCOPY | Age: 82
DRG: 377 | End: 2022-07-21
Payer: MEDICARE

## 2022-07-21 ENCOUNTER — APPOINTMENT (OUTPATIENT)
Dept: ULTRASOUND IMAGING | Age: 82
DRG: 377 | End: 2022-07-21
Attending: NURSE PRACTITIONER
Payer: MEDICARE

## 2022-07-21 LAB
ALBUMIN FLD-MCNC: 0.2 G/DL
ALBUMIN SERPL-MCNC: 1.8 G/DL (ref 3.5–5)
ALBUMIN/GLOB SERPL: 0.6 {RATIO} (ref 1.1–2.2)
ALP SERPL-CCNC: 46 U/L (ref 45–117)
ALT SERPL-CCNC: 20 U/L (ref 12–78)
ANION GAP SERPL CALC-SCNC: 3 MMOL/L (ref 5–15)
ANION GAP SERPL CALC-SCNC: 5 MMOL/L (ref 5–15)
APPEARANCE FLD: CLEAR
AST SERPL-CCNC: 48 U/L (ref 15–37)
BILIRUB SERPL-MCNC: 1.5 MG/DL (ref 0.2–1)
BUN SERPL-MCNC: 68 MG/DL (ref 6–20)
BUN SERPL-MCNC: 69 MG/DL (ref 6–20)
BUN/CREAT SERPL: 25 (ref 12–20)
BUN/CREAT SERPL: 26 (ref 12–20)
CALCIUM SERPL-MCNC: 8.2 MG/DL (ref 8.5–10.1)
CALCIUM SERPL-MCNC: 8.3 MG/DL (ref 8.5–10.1)
CHLORIDE SERPL-SCNC: 110 MMOL/L (ref 97–108)
CHLORIDE SERPL-SCNC: 110 MMOL/L (ref 97–108)
CO2 SERPL-SCNC: 28 MMOL/L (ref 21–32)
CO2 SERPL-SCNC: 29 MMOL/L (ref 21–32)
COLOR FLD: YELLOW
COMMENT, HOLDF: NORMAL
CREAT SERPL-MCNC: 2.7 MG/DL (ref 0.55–1.02)
CREAT SERPL-MCNC: 2.72 MG/DL (ref 0.55–1.02)
ERYTHROCYTE [DISTWIDTH] IN BLOOD BY AUTOMATED COUNT: 16.2 % (ref 11.5–14.5)
GLOBULIN SER CALC-MCNC: 2.9 G/DL (ref 2–4)
GLUCOSE SERPL-MCNC: 110 MG/DL (ref 65–100)
GLUCOSE SERPL-MCNC: 125 MG/DL (ref 65–100)
HCT VFR BLD AUTO: 26.4 % (ref 35–47)
HCT VFR BLD AUTO: 27.5 % (ref 35–47)
HGB BLD-MCNC: 8.4 G/DL (ref 11.5–16)
HGB BLD-MCNC: 8.8 G/DL (ref 11.5–16)
INR PPP: 1.8 (ref 0.9–1.1)
LYMPHOCYTES NFR FLD: 33 %
MCH RBC QN AUTO: 32.2 PG (ref 26–34)
MCHC RBC AUTO-ENTMCNC: 32 G/DL (ref 30–36.5)
MCV RBC AUTO: 100.7 FL (ref 80–99)
MESOTHL CELL NFR FLD: 1 %
MONOS+MACROS NFR FLD: 58 %
NEUTROPHILS NFR FLD: 8 %
NRBC # BLD: 0 K/UL (ref 0–0.01)
NRBC BLD-RTO: 0 PER 100 WBC
NUC CELL # FLD: 46 /CU MM
PLATELET # BLD AUTO: 93 K/UL (ref 150–400)
PMV BLD AUTO: 10.9 FL (ref 8.9–12.9)
POTASSIUM SERPL-SCNC: 4.6 MMOL/L (ref 3.5–5.1)
POTASSIUM SERPL-SCNC: 5 MMOL/L (ref 3.5–5.1)
PROT FLD-MCNC: 0.6 G/DL
PROT SERPL-MCNC: 4.7 G/DL (ref 6.4–8.2)
PROTHROMBIN TIME: 17.9 SEC (ref 9–11.1)
RBC # BLD AUTO: 2.73 M/UL (ref 3.8–5.2)
RBC # FLD: >100 /CU MM
SAMPLES BEING HELD,HOLD: NORMAL
SODIUM SERPL-SCNC: 142 MMOL/L (ref 136–145)
SODIUM SERPL-SCNC: 143 MMOL/L (ref 136–145)
SPECIMEN SOURCE FLD: ABNORMAL
SPECIMEN SOURCE FLD: NORMAL
SPECIMEN SOURCE FLD: NORMAL
WBC # BLD AUTO: 5.6 K/UL (ref 3.6–11)

## 2022-07-21 PROCEDURE — 77030008565 HC TBNG SUC IRR ERBE -B: Performed by: INTERNAL MEDICINE

## 2022-07-21 PROCEDURE — 85018 HEMOGLOBIN: CPT

## 2022-07-21 PROCEDURE — 74011250636 HC RX REV CODE- 250/636: Performed by: INTERNAL MEDICINE

## 2022-07-21 PROCEDURE — 74011250637 HC RX REV CODE- 250/637: Performed by: INTERNAL MEDICINE

## 2022-07-21 PROCEDURE — 76705 ECHO EXAM OF ABDOMEN: CPT

## 2022-07-21 PROCEDURE — 74011000250 HC RX REV CODE- 250: Performed by: INTERNAL MEDICINE

## 2022-07-21 PROCEDURE — 85610 PROTHROMBIN TIME: CPT

## 2022-07-21 PROCEDURE — 77030028236 US GUIDE PARACENTESIS

## 2022-07-21 PROCEDURE — 77010033678 HC OXYGEN DAILY

## 2022-07-21 PROCEDURE — 89050 BODY FLUID CELL COUNT: CPT

## 2022-07-21 PROCEDURE — 0W9G3ZZ DRAINAGE OF PERITONEAL CAVITY, PERCUTANEOUS APPROACH: ICD-10-PCS | Performed by: RADIOLOGY

## 2022-07-21 PROCEDURE — 2709999900 HC NON-CHARGEABLE SUPPLY: Performed by: INTERNAL MEDICINE

## 2022-07-21 PROCEDURE — 0W3P8ZZ CONTROL BLEEDING IN GASTROINTESTINAL TRACT, VIA NATURAL OR ARTIFICIAL OPENING ENDOSCOPIC: ICD-10-PCS | Performed by: INTERNAL MEDICINE

## 2022-07-21 PROCEDURE — 94760 N-INVAS EAR/PLS OXIMETRY 1: CPT

## 2022-07-21 PROCEDURE — C9113 INJ PANTOPRAZOLE SODIUM, VIA: HCPCS | Performed by: INTERNAL MEDICINE

## 2022-07-21 PROCEDURE — 76060000032 HC ANESTHESIA 0.5 TO 1 HR: Performed by: INTERNAL MEDICINE

## 2022-07-21 PROCEDURE — 87205 SMEAR GRAM STAIN: CPT

## 2022-07-21 PROCEDURE — 82042 OTHER SOURCE ALBUMIN QUAN EA: CPT

## 2022-07-21 PROCEDURE — 77030019988 HC FCPS ENDOSC DISP BSC -B: Performed by: INTERNAL MEDICINE

## 2022-07-21 PROCEDURE — 87015 SPECIMEN INFECT AGNT CONCNTJ: CPT

## 2022-07-21 PROCEDURE — 76040000007: Performed by: INTERNAL MEDICINE

## 2022-07-21 PROCEDURE — 74011250636 HC RX REV CODE- 250/636: Performed by: NURSE ANESTHETIST, CERTIFIED REGISTERED

## 2022-07-21 PROCEDURE — 65270000046 HC RM TELEMETRY

## 2022-07-21 PROCEDURE — 77030022875 HC PRB AR PLSM COAG ERBE -C: Performed by: INTERNAL MEDICINE

## 2022-07-21 PROCEDURE — 74011000250 HC RX REV CODE- 250: Performed by: NURSE ANESTHETIST, CERTIFIED REGISTERED

## 2022-07-21 PROCEDURE — 74011000258 HC RX REV CODE- 258: Performed by: INTERNAL MEDICINE

## 2022-07-21 PROCEDURE — P9047 ALBUMIN (HUMAN), 25%, 50ML: HCPCS | Performed by: INTERNAL MEDICINE

## 2022-07-21 PROCEDURE — 84157 ASSAY OF PROTEIN OTHER: CPT

## 2022-07-21 RX ORDER — MIDAZOLAM HYDROCHLORIDE 1 MG/ML
.25-5 INJECTION, SOLUTION INTRAMUSCULAR; INTRAVENOUS
Status: DISCONTINUED | OUTPATIENT
Start: 2022-07-21 | End: 2022-07-21 | Stop reason: HOSPADM

## 2022-07-21 RX ORDER — SODIUM CHLORIDE 9 MG/ML
100 INJECTION, SOLUTION INTRAVENOUS CONTINUOUS
Status: DISPENSED | OUTPATIENT
Start: 2022-07-21 | End: 2022-07-21

## 2022-07-21 RX ORDER — NALOXONE HYDROCHLORIDE 0.4 MG/ML
0.4 INJECTION, SOLUTION INTRAMUSCULAR; INTRAVENOUS; SUBCUTANEOUS
Status: DISCONTINUED | OUTPATIENT
Start: 2022-07-21 | End: 2022-07-21 | Stop reason: HOSPADM

## 2022-07-21 RX ORDER — EPHEDRINE SULFATE/0.9% NACL/PF 50 MG/5 ML
SYRINGE (ML) INTRAVENOUS AS NEEDED
Status: DISCONTINUED | OUTPATIENT
Start: 2022-07-21 | End: 2022-07-21 | Stop reason: HOSPADM

## 2022-07-21 RX ORDER — ALBUMIN HUMAN 250 G/1000ML
12.5 SOLUTION INTRAVENOUS ONCE
Status: COMPLETED | OUTPATIENT
Start: 2022-07-21 | End: 2022-07-21

## 2022-07-21 RX ORDER — FUROSEMIDE 10 MG/ML
20 INJECTION INTRAMUSCULAR; INTRAVENOUS DAILY
Status: DISCONTINUED | OUTPATIENT
Start: 2022-07-21 | End: 2022-07-24

## 2022-07-21 RX ORDER — SODIUM CHLORIDE 0.9 % (FLUSH) 0.9 %
5-40 SYRINGE (ML) INJECTION EVERY 8 HOURS
Status: DISCONTINUED | OUTPATIENT
Start: 2022-07-21 | End: 2022-07-21

## 2022-07-21 RX ORDER — DEXTROMETHORPHAN/PSEUDOEPHED 2.5-7.5/.8
1.2 DROPS ORAL
Status: DISCONTINUED | OUTPATIENT
Start: 2022-07-21 | End: 2022-07-21 | Stop reason: HOSPADM

## 2022-07-21 RX ORDER — SODIUM CHLORIDE 0.9 % (FLUSH) 0.9 %
5-40 SYRINGE (ML) INJECTION AS NEEDED
Status: DISCONTINUED | OUTPATIENT
Start: 2022-07-21 | End: 2022-07-21

## 2022-07-21 RX ORDER — ATROPINE SULFATE 0.1 MG/ML
0.5 INJECTION INTRAVENOUS
Status: DISCONTINUED | OUTPATIENT
Start: 2022-07-21 | End: 2022-07-21 | Stop reason: HOSPADM

## 2022-07-21 RX ORDER — LIDOCAINE HYDROCHLORIDE 20 MG/ML
INJECTION, SOLUTION EPIDURAL; INFILTRATION; INTRACAUDAL; PERINEURAL AS NEEDED
Status: DISCONTINUED | OUTPATIENT
Start: 2022-07-21 | End: 2022-07-21 | Stop reason: HOSPADM

## 2022-07-21 RX ORDER — LIDOCAINE HYDROCHLORIDE 10 MG/ML
10 INJECTION, SOLUTION EPIDURAL; INFILTRATION; INTRACAUDAL; PERINEURAL
Status: COMPLETED | OUTPATIENT
Start: 2022-07-21 | End: 2022-07-21

## 2022-07-21 RX ORDER — PROPOFOL 10 MG/ML
INJECTION, EMULSION INTRAVENOUS AS NEEDED
Status: DISCONTINUED | OUTPATIENT
Start: 2022-07-21 | End: 2022-07-21 | Stop reason: HOSPADM

## 2022-07-21 RX ORDER — FLUMAZENIL 0.1 MG/ML
0.2 INJECTION INTRAVENOUS
Status: DISCONTINUED | OUTPATIENT
Start: 2022-07-21 | End: 2022-07-21 | Stop reason: HOSPADM

## 2022-07-21 RX ADMIN — SODIUM CHLORIDE 8 MG/HR: 900 INJECTION INTRAVENOUS at 01:13

## 2022-07-21 RX ADMIN — SODIUM CHLORIDE, PRESERVATIVE FREE 10 ML: 5 INJECTION INTRAVENOUS at 06:00

## 2022-07-21 RX ADMIN — Medication 10 MG: at 15:20

## 2022-07-21 RX ADMIN — PROPOFOL 50 MG: 10 INJECTION, EMULSION INTRAVENOUS at 15:24

## 2022-07-21 RX ADMIN — SODIUM CHLORIDE 100 ML/HR: 9 INJECTION, SOLUTION INTRAVENOUS at 15:00

## 2022-07-21 RX ADMIN — PIPERACILLIN AND TAZOBACTAM 4.5 G: 4; .5 INJECTION, POWDER, LYOPHILIZED, FOR SOLUTION INTRAVENOUS at 12:18

## 2022-07-21 RX ADMIN — FENOFIBRATE 145 MG: 145 TABLET ORAL at 20:30

## 2022-07-21 RX ADMIN — SODIUM CHLORIDE 8 MG/HR: 900 INJECTION INTRAVENOUS at 00:43

## 2022-07-21 RX ADMIN — PROPOFOL 30 MG: 10 INJECTION, EMULSION INTRAVENOUS at 15:21

## 2022-07-21 RX ADMIN — SODIUM CHLORIDE, PRESERVATIVE FREE 10 ML: 5 INJECTION INTRAVENOUS at 00:52

## 2022-07-21 RX ADMIN — LIDOCAINE HYDROCHLORIDE 10 ML: 10 INJECTION, SOLUTION EPIDURAL; INFILTRATION; INTRACAUDAL; PERINEURAL at 16:45

## 2022-07-21 RX ADMIN — SODIUM CHLORIDE 40 MG: 9 INJECTION, SOLUTION INTRAMUSCULAR; INTRAVENOUS; SUBCUTANEOUS at 12:18

## 2022-07-21 RX ADMIN — TRAZODONE HYDROCHLORIDE 25 MG: 50 TABLET ORAL at 00:42

## 2022-07-21 RX ADMIN — TRAZODONE HYDROCHLORIDE 25 MG: 50 TABLET ORAL at 20:31

## 2022-07-21 RX ADMIN — Medication 10 MG: at 15:35

## 2022-07-21 RX ADMIN — SODIUM CHLORIDE 8 MG/HR: 900 INJECTION INTRAVENOUS at 05:42

## 2022-07-21 RX ADMIN — FUROSEMIDE 20 MG: 10 INJECTION, SOLUTION INTRAMUSCULAR; INTRAVENOUS at 12:28

## 2022-07-21 RX ADMIN — MAGNESIUM OXIDE TAB 400 MG (241.3 MG ELEMENTAL MG) 400 MG: 400 (241.3 MG) TAB at 12:19

## 2022-07-21 RX ADMIN — ATORVASTATIN CALCIUM 10 MG: 10 TABLET, FILM COATED ORAL at 00:42

## 2022-07-21 RX ADMIN — SODIUM CHLORIDE, PRESERVATIVE FREE 10 ML: 5 INJECTION INTRAVENOUS at 19:05

## 2022-07-21 RX ADMIN — SIMETHICONE 80 MG: 20 SUSPENSION/ DROPS ORAL at 15:33

## 2022-07-21 RX ADMIN — SODIUM CHLORIDE 40 MG: 9 INJECTION, SOLUTION INTRAMUSCULAR; INTRAVENOUS; SUBCUTANEOUS at 20:31

## 2022-07-21 RX ADMIN — PROPOFOL 20 MG: 10 INJECTION, EMULSION INTRAVENOUS at 15:19

## 2022-07-21 RX ADMIN — CITALOPRAM HYDROBROMIDE 20 MG: 20 TABLET ORAL at 18:54

## 2022-07-21 RX ADMIN — FERROUS SULFATE TAB 325 MG (65 MG ELEMENTAL FE) 325 MG: 325 (65 FE) TAB at 12:22

## 2022-07-21 RX ADMIN — ATORVASTATIN CALCIUM 10 MG: 10 TABLET, FILM COATED ORAL at 20:30

## 2022-07-21 RX ADMIN — LIDOCAINE HYDROCHLORIDE 100 MG: 20 INJECTION, SOLUTION EPIDURAL; INFILTRATION; INTRACAUDAL; PERINEURAL at 15:19

## 2022-07-21 RX ADMIN — SODIUM CHLORIDE, PRESERVATIVE FREE 10 ML: 5 INJECTION INTRAVENOUS at 20:31

## 2022-07-21 RX ADMIN — PIPERACILLIN AND TAZOBACTAM 3.38 G: 3; .375 INJECTION, POWDER, FOR SOLUTION INTRAVENOUS at 19:03

## 2022-07-21 RX ADMIN — ALBUMIN (HUMAN) 12.5 G: 0.25 INJECTION, SOLUTION INTRAVENOUS at 03:23

## 2022-07-21 NOTE — PROCEDURES
[facility]                   Endoscopic Gastroduodenoscopy Procedure Note      7/21/2022  Jolly Leyden  1940  [id number]    Procedure: Endoscopic Gastroduodenoscopy with control of bleeding    Indication:  Melena/hematochezia     Pre-operative Diagnosis: see indication above    Post-operative Diagnosis: see findings below    : RAHUL Dorado MD    Referring Provider:  Gabrielle Barr MD      Anesthesia/Sedation:  MAC anesthesia Propofol    Airway assessment: No airway problems anticipated    Pre-Procedural Exam:      Airway: clear, no airway problems anticipated  Heart: RRR, without gallops or rubs  Lungs: clear bilaterally without wheezes, crackles, or rhonchi  Abdomen: soft, nontender, nondistended, bowel sounds present  Mental Status: awake, alert and oriented to person, place and time       Procedure Details     After infomed consent was obtained for the procedure, with all risks and benefits of procedure explained the patient was taken to the endoscopy suite and placed in the left lateral decubitus position. Following sequential administration of sedation as per above, the endoscope was inserted into the mouth and advanced under direct vision to second portion of the duodenum. A careful inspection was made as the gastroscope was withdrawn, including a retroflexed view of the proximal stomach; findings and interventions are described below. Findings:   Esophagus: Upper, mid and lower esophagus normal. Focal esophagitis at the GE junction  Stomach: No blood in stomach. Severe, diffuse, erythematous antral gastropathy. Looks like gastric antral vascular ectasia . Ablated over 75% with APC  Duodenum:  Erosions present but no ulcers    Therapies:  APC of stomach    Specimens: none           Complications:   None; patient tolerated the procedure well. EBL:  None. Impression:      1. Esophagitis at GE junction  2.  Severe, diffuse, erythematous antral gastropathy. Treated with APC  3. Erosive duodenitis    Recommendations:  -Acid suppression with a proton pump inhibitor. , -Follow symptoms. , -Will need repeat EGD in 4-6 weeks by Dr Olman Chawla to further treat gastropathy  Full liquid diet    Paty Ann MD7/21/2022

## 2022-07-21 NOTE — ANESTHESIA PREPROCEDURE EVALUATION
Anesthetic History   No history of anesthetic complications            Review of Systems / Medical History  Patient summary reviewed, nursing notes reviewed and pertinent labs reviewed    Pulmonary  Within defined limits                 Neuro/Psych       CVA  TIA and psychiatric history (depression)     Cardiovascular    Hypertension          Hyperlipidemia    Exercise tolerance: <4 METS  Comments: 5-20-22 EKG:  Sinus bradycardia with sinus arrhythmia with 1st degree AV block   When compared with ECG of 14-OCT-2020 20:43,   SD interval has increased   Nonspecific T wave abnormality, worse in Inferior leads   QT has shortened    GI/Hepatic/Renal     GERD          Comments: Melena, acute blood loss anemia, hgb 7.4      Hx of hemorrhagic gastritis Endo/Other    Diabetes: well controlled, type 2    Anemia     Other Findings   Comments: [place]  Chronic pain  Ascites  Hb 8.4           Physical Exam    Airway  Mallampati: I  TM Distance: 4 - 6 cm  Neck ROM: decreased range of motion   Mouth opening: Diminished (comment)     Cardiovascular    Rhythm: regular  Rate: normal         Dental    Dentition: Full lower dentures and Full upper dentures     Pulmonary  Breath sounds clear to auscultation               Abdominal  GI exam deferred       Other Findings            Anesthetic Plan    ASA: 3  Anesthesia type: total IV anesthesia and general          Induction: Intravenous  Anesthetic plan and risks discussed with: Patient      Propofol MAC/Supernova

## 2022-07-21 NOTE — PROGRESS NOTES
Hospitalist Progress Note    NAME: Abigail Butts   :  1940   MRN:  [id number]       Assessment / Plan:    Acute Upper GI bleeding  Acute Blood loss anemia new acute on chronic anemia  -IV Protonix drip started on admission, changed to IV Protonix twice daily  -H&H every 6 hours  -Transfuse as indicated if hemoglobin less than 7 no active  - GI motility for the complaints. - Avoid NSAIDs  - Hemoglobin stable  - Previous EGD in  normal Esophagus. Severe diffuse headache neuropathy with bleeding. Acute kidney injury  -Generalized edema and anasarca  -Gentle diuresis  -A dose of albumin infusion to expand intravascular volume was given  - Possible better renal syndrome     liver cirrhosis, decompensated  - New ascites seen on presentation  - Paracentesis to be obtained with labs  - Follow INR  - GI is following  - ultrasound with cirrhosis, moderate volume ascites       Possible right lower lobe pneumonia  -Consolidative process seen at the base of the right lung  -Empiric coverage with Zosyn. De-escalate as appropriate  -send protraction      Other medical comorbidities  Hypertension  Hyperlipidemia  DM type II  History of CVA without residual deficit     DVT prophylaxis: SCD, chemical prophylaxis contraindicated        Estimated discharge date:   Barriers: medical improvement     Code status: Full  Prophylaxis: SCD's  Recommended Disposition: Home w/Family     Subjective:     Patient was seen and examined. No acute events overnight. Discussed with RN overnight events. All patient's questions were answered. \"Feeling ok\"    Objective:     VITALS:   Last 24hrs VS reviewed since prior progress note.  Most recent are:  Patient Vitals for the past 24 hrs:   Temp Pulse Resp BP SpO2   22 1442 -- (!) 45 11 (!) 130/40 100 %   22 1228 -- (!) 48 -- (!) 159/79 --   22 1109 97.3 °F (36.3 °C) 63 14 (!) 148/72 97 %   22 0804 97.3 °F (36.3 °C) (!) 51 16 (!) 170/67 100 % 07/21/22 0341 97.5 °F (36.4 °C) (!) 47 15 122/76 94 %   07/20/22 2310 97.3 °F (36.3 °C) (!) 45 16 124/62 100 %     No intake or output data in the 24 hours ending 07/21/22 3089     I had a face to face encounter and independently examined this patient on 7/21/2022, as outlined below:  PHYSICAL EXAM:  General: WD, WN. Alert, cooperative, no acute distress    EENT:  EOMI. Anicteric sclerae. MMM  Resp:  CTA bilaterally, no wheezing or rales. No accessory muscle use  CV:  Regular  rhythm,  No edema  GI:  Soft, Non distended, Non tender. +Bowel sounds  Neurologic:  EOMs intact. No facial asymmetry. No aphasia or slurred speech. Symmetrical strength, Sensation grossly intact. Alert and oriented X 4.     Psych:   Good insight. Not anxious nor agitated  Skin:  No rashes. No jaundice    Reviewed most current lab test results and cultures  YES  Reviewed most current radiology test results   YES  Review and summation of old records today    NO  Reviewed patient's current orders and MAR    YES  PMH/SH reviewed - no change compared to H&P  ________________________________________________________________________  Care Plan discussed with:    Comments   Patient x    Family      RN x    Care Manager     Consultant                        Multidiciplinary team rounds were held today with , nursing, pharmacist and clinical coordinator. Patient's plan of care was discussed; medications were reviewed and discharge planning was addressed. ________________________________________________________________________        Comments   >50% of visit spent in counseling and coordination of care     ________________________________________________________________________  Yvonne Ohara MD     Procedures: see electronic medical records for all procedures/Xrays and details which were not copied into this note but were reviewed prior to creation of Plan.       LABS:  I reviewed today's most current labs and imaging studies.   Pertinent labs include:  Recent Labs     07/21/22  1252 07/20/22  2342   WBC  --  5.6   HGB 8.4* 8.8*   HCT 26.4* 27.5*   PLT  --  93*     Recent Labs     07/21/22  1252 07/21/22  0508 07/20/22  2342   NA  --  142 143   K  --  5.0 4.6   CL  --  110* 110*   CO2  --  29 28   GLU  --  110* 125*   BUN  --  69* 68*   CREA  --  2.70* 2.72*   CA  --  8.2* 8.3*   ALB  --  1.8*  --    TBILI  --  1.5*  --    ALT  --  20  --    INR 1.8*  --   --        Signed: Miguelito Ignacio MD

## 2022-07-21 NOTE — PROGRESS NOTES
Nutrition: [facility] referral received as noted below. Chart briefly reviewed. No weight loss noted on chart review. Patient NPO for EGD. Nutrition assessment not indicated at this time. Patient will be re-screened per policy or RD available by consult if needs arise. Thanks.     Recently Lost Weight Without Trying Unsure filed at 07/20/2022 6133   If Yes, How Much Weight Loss --   Eating Poorly Due to Decreased Appetite No filed at 07/20/2022 2300     Wt Readings from Last 5 Encounters:   05/24/22 71.7 kg (158 lb 1.1 oz)   09/28/21 71.7 kg (158 lb)   09/18/21 71.7 kg (158 lb)   10/14/20 73 kg (161 lb)   10/15/20 73 kg (160 lb 15 oz)     Deon Blanco, [address]   Ext [phone]

## 2022-07-21 NOTE — H&P
GENERAL GENERIC H&P/CONSULT    Subjective:  17-year-old female with past medical history including hypertension, hyperlipidemia, diabetes type 2, history of CVA, history of GI bleed. Patient is referred from outside facility for further management of GI bleed. Earlier today she started experiencing nausea, vomiting and had melanotic stool. At the referring facility FOBT was positive, hemoglobin was 6.5 mg/dL. Patient was given a unit of PRBC. CT of the abdomen showed bilateral pleural effusion and consolidative opacity in the right lower lobe. There is large volume abdominal pelvic ascites and diffuse body wall edema. Of note patient was admitted in May for similar EGD showed bleeding antral gastritis which was cauterized. Evaluated patient at bedside, she is alert and awake however does not participate in a meaningful history. Does not appear to be in pain or in acute distress. However she appears to be edematous. Past Medical History:   Diagnosis Date    Chronic pain     hip, back    Depression     Diabetes ([address]     GERD (gastroesophageal reflux disease)     Hemorrhagic gastritis     10/2013    [place] (hard of hearing)     no hearing aides    Hyperlipemia     Hypertension     Stroke (cerebrum) ([address]     no residual    Stroke ([address]     No residual says patient      Past Surgical History:   Procedure Laterality Date    HX  SECTION      HX CHOLECYSTECTOMY      IR KYPHOPLASTY LUMBAR  10/19/2020    IR KYPHOPLASTY LUMBAR  10/19/2020    AL COLONOSCOPY FLX DX W/COLLJ SPEC WHEN PFRMD  2013         AL ESOPHAGOGASTRODUODENOSCOPY TRANSORAL DIAGNOSTIC  10/10/2013           Prior to Admission medications    Medication Sig Start Date End Date Taking? Authorizing Provider   folic acid (FOLVITE) 953 mcg tablet Take 800 mcg by mouth daily. Provider, Historical   magnesium 250 mg tab Take 250 mg by mouth daily (after dinner).     Provider, Historical   multivitamin (ONE A DAY) tablet Take 1 Tab by mouth daily. Provider, Historical   carvediloL (Coreg) 3.125 mg tablet Take 3.125 mg by mouth two (2) times daily (with meals). Provider, Historical   meclizine (ANTIVERT) 12.5 mg tablet Take 6.25 mg by mouth three (3) times daily as needed. Provider, Historical   ascorbic acid, vitamin C, (VITAMIN C) 500 mg tablet Take 500 mg by mouth daily. Provider, Historical   ferrous sulfate (IRON) 325 mg (65 mg iron) EC tablet Take 325 mg by mouth Daily (before breakfast). Provider, Historical   traZODone (DESYREL) 50 mg tablet Take 25 mg by mouth nightly. 8/12/13   Contreras Broussard MD   pentoxifylline CR (TRENTAL) 400 mg CR tablet Take 400 mg by mouth two (2) times a day. Provider, Historical   simvastatin (ZOCOR) 20 mg tablet Take 20 mg by mouth nightly. Provider, Historical   fenofibrate nanocrystallized (TRICOR) 145 mg tablet Take 145 mg by mouth nightly. Provider, Historical   citalopram (CELEXA) 20 mg tablet Take 20 mg by mouth every evening. Provider, Historical     Allergies   Allergen Reactions    Aspirin Hives    Codeine Hives and Nausea and Vomiting      Social History     Tobacco Use    Smoking status: Never    Smokeless tobacco: Never   Substance Use Topics    Alcohol use: No      Family History   Problem Relation Age of Onset    Other Other         HTN and DM      Review of Systems   Unable to perform ROS: Dementia     Objective:    No intake/output data recorded. No intake/output data recorded. Patient Vitals for the past 8 hrs:   BP Temp Pulse Resp SpO2   07/21/22 0341 122/76 97.5 °F (36.4 °C) (!) 47 15 94 %   07/20/22 2310 124/62 97.3 °F (36.3 °C) (!) 45 16 100 %     Physical Exam  Constitutional:       General: She is not in acute distress. Appearance: Normal appearance. HENT:      Head: Normocephalic and atraumatic. Mouth/Throat:      Pharynx: Oropharynx is clear. Eyes:      Extraocular Movements: Extraocular movements intact.       Conjunctiva/sclera: Conjunctivae normal.      Pupils: Pupils are equal, round, and reactive to light. Cardiovascular:      Rate and Rhythm: Normal rate. Pulses: Normal pulses. Heart sounds: Normal heart sounds. No murmur heard. No friction rub. No gallop. Pulmonary:      Effort: Pulmonary effort is normal.      Breath sounds: Normal breath sounds. Abdominal:      General: Bowel sounds are normal.      Palpations: Abdomen is soft. Tenderness: There is no abdominal tenderness. Musculoskeletal:         General: Normal range of motion. Cervical back: Normal range of motion and neck supple. Right lower leg: Edema present. Left lower leg: Edema present. Neurological:      General: No focal deficit present. Mental Status: She is alert. Cranial Nerves: No cranial nerve deficit. Motor: No weakness.         Labs:    Recent Results (from the past 24 hour(s))   CBC W/O DIFF    Collection Time: 07/20/22 11:42 PM   Result Value Ref Range    WBC 5.6 3.6 - 11.0 K/uL    RBC 2.73 (L) 3.80 - 5.20 M/uL    HGB 8.8 (L) 11.5 - 16.0 g/dL    HCT 27.5 (L) 35.0 - 47.0 %    .7 (H) 80.0 - 99.0 FL    MCH 32.2 26.0 - 34.0 PG    MCHC 32.0 30.0 - 36.5 g/dL    RDW 16.2 (H) 11.5 - 14.5 %    PLATELET 93 (L) 871 - 400 K/uL    MPV 10.9 8.9 - 12.9 FL    NRBC 0.0 0  WBC    ABSOLUTE NRBC 0.00 0.00 - 4.99 K/uL   METABOLIC PANEL, BASIC    Collection Time: 07/20/22 11:42 PM   Result Value Ref Range    Sodium 143 136 - 145 mmol/L    Potassium 4.6 3.5 - 5.1 mmol/L    Chloride 110 (H) 97 - 108 mmol/L    CO2 28 21 - 32 mmol/L    Anion gap 5 5 - 15 mmol/L    Glucose 125 (H) 65 - 100 mg/dL    BUN 68 (H) 6 - 20 MG/DL    Creatinine 2.72 (H) 0.55 - 1.02 MG/DL    BUN/Creatinine ratio 25 (H) 12 - 20      GFR est AA 20 (L) >60 ml/min/1.73m2    GFR est non-AA 17 (L) >60 ml/min/1.73m2    Calcium 8.3 (L) 8.5 - 68.1 MG/DL   METABOLIC PANEL, COMPREHENSIVE    Collection Time: 07/21/22  5:08 AM   Result Value Ref Range Sodium 142 136 - 145 mmol/L    Potassium 5.0 3.5 - 5.1 mmol/L    Chloride 110 (H) 97 - 108 mmol/L    CO2 29 21 - 32 mmol/L    Anion gap 3 (L) 5 - 15 mmol/L    Glucose 110 (H) 65 - 100 mg/dL    BUN 69 (H) 6 - 20 MG/DL    Creatinine 2.70 (H) 0.55 - 1.02 MG/DL    BUN/Creatinine ratio 26 (H) 12 - 20      GFR est AA 20 (L) >60 ml/min/1.73m2    GFR est non-AA 17 (L) >60 ml/min/1.73m2    Calcium 8.2 (L) 8.5 - 10.1 MG/DL    Bilirubin, total 1.5 (H) 0.2 - 1.0 MG/DL    ALT (SGPT) 20 12 - 78 U/L    AST (SGOT) 48 (H) 15 - 37 U/L    Alk. phosphatase 46 45 - 117 U/L    Protein, total 4.7 (L) 6.4 - 8.2 g/dL    Albumin 1.8 (L) 3.5 - 5.0 g/dL    Globulin 2.9 2.0 - 4.0 g/dL    A-G Ratio 0.6 (L) 1.1 - 2.2           Assessment:  Active Problems:    GI bleed (7/20/2022)      Severe anemia (7/20/2022)  Likely upper GI bleeding  Blood loss anemia  -IV Protonix drip  -H&H every 6 hours  -Transfuse as indicated  -Obtain GI consult in the morning    Acute kidney injury  -Generalized edema and anasarca  -Urine sodium, urine creatinine  -Gentle diuresis  -A dose of albumin infusion to expand intravascular volume    Generalized edema,/anasarca  -Generalized body swelling edema, large volume ascites  -DDx including due to renal failure, rule out liver failure. Obtain serum albumin level, LFT  -Gentle diuresis  -Consider diagnostic paracentesis    Possible right lower lobe pneumonia  -Consolidative process seen at the base of the right lung  -Empiric coverage with Zosyn.   De-escalate as appropriate    Other medical comorbidities  Hypertension  Hyperlipidemia  DM type II  History of CVA without residual deficit    DVT prophylaxis: SCD, chemical prophylaxis contraindicated    Full code      Signed:  Ana Bass MD 7/21/2022

## 2022-07-21 NOTE — PROGRESS NOTES
1503 Report received from [facility], RN. HR 45, Sr. [place] informed. No new orders. 1505 Patient is alert on 2L via NC. Her Right arm is significantly more swollen than her left. Edema +4 right arm and +2 left arm. Legs not assessed. 46 Endoscope was pre-cleaned at the bedside immediately following procedure by LT    1543 Patient saturated with urine. Full stretcher linen change and juanito care provided. She grimaces with movement. 1558 Patient transported to IR with 2 nurses on 2L NC.     1610 TRANSFER - OUT REPORT:    Verbal report given to Lexa Arteaga RN(name) on [facility]  being transferred to [facility](unit) for routine progression of care       Report consisted of patients Situation, Background, Assessment and   Recommendations(SBAR). Information from the following report(s) SBAR, Kardex, Procedure Summary, and Recent Results was reviewed with the receiving nurse. Lines:   Peripheral IV 07/20/22 Anterior;Left;Proximal Antecubital (Active)   Site Assessment Clean, dry, & intact 07/20/22 2310   Phlebitis Assessment 0 07/20/22 2310   Infiltration Assessment 0 07/20/22 2310   Dressing Status Clean, dry, & intact 07/20/22 2310       Peripheral IV (Active)   Site Assessment Clean, dry, & intact 07/20/22 2310   Phlebitis Assessment 0 07/20/22 2310   Infiltration Assessment 0 07/20/22 2310   Dressing Status Clean, dry, & intact 07/20/22 2310        Opportunity for questions and clarification was provided.

## 2022-07-21 NOTE — PROGRESS NOTES
Attempted to call daughter to get phone consent for paracentesis, left message on voicemail to return call.

## 2022-07-21 NOTE — CONSULTS
GI CONSULTATION NOTE  Bettina Benz, NP  [phone] NP in-hospital cell phone M-F until 4:30  After 5pm or on weekends, please call  for physician on call    NAME: Darylene Sneddon   :  1940   MRN:  [id number]   Attending: Dr. Junior Kay  Primary GI: Dr. Catarina Burton  Date/Time:  2022 10:33 AM  Assessment:   -Acute on chronic anemia, drop in hemoglobin  Black stools and hemoccult +, however on PO iron  H/o chronic anemia, last work up  with hemorrhagic antral gastropathy noted s/p 1 hemostatic clip  -?cirrhosis, decompensated, ascites, thrombocytopenia  No h/o esophageal varices  New ascites seen, will need paracentesis with labs (total protein, albumin, cell count with diff, culture)  Needs INR STAT, last visit inr was 1.6  Will order liver serologies evaluate for etiology of liver disease  Will order abdominal ultrasound for further evaluation, CT showing ?nodular liver  Plt 93  -CKD  -Abnormal CT of GI tract  Questionable nodular contour of the liver. GI Hx  - EGD normal esophagus, small HH, normal duodenum. Severe diffuse hemorrhagic antral gastropathy with contact bleeding. Hemostatic clip placed x1 for continued oozing bleeding from bx site with hemostasis achieved. Bx.  - M2A normal with gastritis  - CLN normal  - EGD with hemorrhagic gastritis per note  Plan:   -Plan for EGD today with Dr. Crandall Necessary  -NPO  -Avoid NSAIDs  -PPI 40mg BID  -STAT INR  -Ultrasound guided Paracentesis with lab draw (orders placed)  -Liver serologies drawn tomorrow am  -Abdominal ultrasound  -Supportive measures per primary team. Monitor for S&S of GI bleed  -Trend hemoglobin, goal >7, transfuse as clinically indicated. -Daily CMP  -Will continue to follow. Please call GI with any further questions or concerns. Thank you! Plan discussed with Dr. Crandall Necessary  Subjective:     HISTORY OF PRESENT ILLNESS:     Darylene Sneddon is an 78 y.o.   female who we are asked to see for complaint of GI bleeding,severe anemia. Patient back from her outside facility for N/V and melanotic stool. Patient denies any abdominal pain. Reports had N/V, no hematemesis or coffee ground emesis. Thinks appetite has been stable. Doesn't know if has had any weight lost. Unsure what her home medications are or if she's taken any NSAIDs. Told by nursing at her facility had black stool, doesn't think had any bright red. Unsure when last procedures/interventions were. Doesn't think she's on any blood thinners. Per med rec no blood thinners at home. Is on iron supplemenation      Past Medical History:   Diagnosis Date    Chronic pain     hip, back    Depression     Diabetes ([address]     GERD (gastroesophageal reflux disease)     Hemorrhagic gastritis     10/2013    [place] (hard of hearing)     no hearing aides    Hyperlipemia     Hypertension     Stroke (cerebrum) ([address]     no residual    Stroke ([address]     No residual says patient      Past Surgical History:   Procedure Laterality Date    HX  SECTION      HX CHOLECYSTECTOMY      IR KYPHOPLASTY LUMBAR  10/19/2020    IR KYPHOPLASTY LUMBAR  10/19/2020    OK COLONOSCOPY FLX DX W/COLLJ SPEC WHEN PFRMD  2013         OK ESOPHAGOGASTRODUODENOSCOPY TRANSORAL DIAGNOSTIC  10/10/2013          Social History     Tobacco Use    Smoking status: Never    Smokeless tobacco: Never   Substance Use Topics    Alcohol use: No      Family History   Problem Relation Age of Onset    Other Other         HTN and DM      Allergies   Allergen Reactions    Aspirin Hives    Codeine Hives and Nausea and Vomiting      Prior to Admission medications    Medication Sig Start Date End Date Taking? Authorizing Provider   folic acid (FOLVITE) 869 mcg tablet Take 800 mcg by mouth daily. Provider, Historical   magnesium 250 mg tab Take 250 mg by mouth daily (after dinner). Provider, Historical   multivitamin (ONE A DAY) tablet Take 1 Tab by mouth daily.     Provider, Historical carvediloL (Coreg) 3.125 mg tablet Take 3.125 mg by mouth two (2) times daily (with meals). Provider, Historical   meclizine (ANTIVERT) 12.5 mg tablet Take 6.25 mg by mouth three (3) times daily as needed. Provider, Historical   ascorbic acid, vitamin C, (VITAMIN C) 500 mg tablet Take 500 mg by mouth daily. Provider, Historical   ferrous sulfate (IRON) 325 mg (65 mg iron) EC tablet Take 325 mg by mouth Daily (before breakfast). Provider, Historical   traZODone (DESYREL) 50 mg tablet Take 25 mg by mouth nightly. 8/12/13   Farhana Broussard MD   pentoxifylline CR (TRENTAL) 400 mg CR tablet Take 400 mg by mouth two (2) times a day. Provider, Historical   simvastatin (ZOCOR) 20 mg tablet Take 20 mg by mouth nightly. Provider, Historical   fenofibrate nanocrystallized (TRICOR) 145 mg tablet Take 145 mg by mouth nightly. Provider, Historical   citalopram (CELEXA) 20 mg tablet Take 20 mg by mouth every evening.     Provider, Historical       Patient Active Problem List   Diagnosis Code    Stroke ([address] I63.9    HTN (hypertension) I10    DM (diabetes mellitus) ([address] E11.9    Other and unspecified hyperlipidemia E78.5    Anemia, unspecified D64.9    Lumbar vertebral fracture ([facility] S32.009A    Acute GI bleeding K92.2    Symptomatic anemia D64.9    GI bleed K92.2    Severe anemia D64.9       REVIEW OF SYSTEMS:    Constitutional: negative fever, negative chills, negative weight loss  Eyes:   negative visual changes  ENT:   negative sore throat, tongue or lip swelling   Respiratory:  negative cough, negative dyspnea  Cards:  negative for chest pain, palpitations, lower extremity edema  GI:   See HPI  :  negative for frequency, dysuria  Integument:  negative for rash and pruritus  Heme:  negative for easy bruising and gum/nose bleeding  Musculoskel: negative for myalgias,  back pain and muscle weakness  Neuro: negative for headaches, dizziness, vertigo  Psych: negative for feelings of anxiety, depression       Pertinent Positives: [place], melena, N/V      Objective:   VITALS:    Visit Vitals  BP (!) 170/67 (BP 1 Location: Right upper arm, BP Patient Position: At rest;Semi fowlers)   Pulse (!) 51   Temp 97.3 °F (36.3 °C)   Resp 16   SpO2 100%       PHYSICAL EXAM:   General:          Alert, cooperative, no distress, appears stated age. Head:               Normocephalic, without obvious abnormality, atraumatic. Eyes:               Conjunctivae clear and pale, anicteric sclerae. Pupils are equal  Nose:               Nares normal.   Throat:             Lips, mucosa, and tongue normal.  N  Neck:               Supple, symmetrical,   Back:               Symmetric,   Lungs:             CTA bilaterally. No wheezing/rhonchi/rales. Chest wall:      No tenderness or deformity. No Accessory muscle use. Heart:              Regular rate and rhythm,  no rub or gallop. Abdomen:        Soft, non-tender. Not distended. Bowel sounds normal. No masses  Extremities:     Atraumatic, No cyanosis. No edema. No clubbing  Skin:                Texture, turgor normal. No rashes/lesions/jaundice  Lymph:            Cervical, supraclavicular normal.  Psych:             Not anxious or agitated. Neurologic:      EOMs intact. No facial asymmetry. No aphasia or slurred speech. Normal strength, A/O X 3.      LAB DATA REVIEWED:    Recent Results (from the past 24 hour(s))   CBC W/O DIFF    Collection Time: 07/20/22 11:42 PM   Result Value Ref Range    WBC 5.6 3.6 - 11.0 K/uL    RBC 2.73 (L) 3.80 - 5.20 M/uL    HGB 8.8 (L) 11.5 - 16.0 g/dL    HCT 27.5 (L) 35.0 - 47.0 %    .7 (H) 80.0 - 99.0 FL    MCH 32.2 26.0 - 34.0 PG    MCHC 32.0 30.0 - 36.5 g/dL    RDW 16.2 (H) 11.5 - 14.5 %    PLATELET 93 (L) 977 - 400 K/uL    MPV 10.9 8.9 - 12.9 FL    NRBC 0.0 0  WBC    ABSOLUTE NRBC 0.00 0.00 - 8.08 K/uL   METABOLIC PANEL, BASIC    Collection Time: 07/20/22 11:42 PM   Result Value Ref Range    Sodium 143 136 - 145 mmol/L Potassium 4.6 3.5 - 5.1 mmol/L    Chloride 110 (H) 97 - 108 mmol/L    CO2 28 21 - 32 mmol/L    Anion gap 5 5 - 15 mmol/L    Glucose 125 (H) 65 - 100 mg/dL    BUN 68 (H) 6 - 20 MG/DL    Creatinine 2.72 (H) 0.55 - 1.02 MG/DL    BUN/Creatinine ratio 25 (H) 12 - 20      GFR est AA 20 (L) >60 ml/min/1.73m2    GFR est non-AA 17 (L) >60 ml/min/1.73m2    Calcium 8.3 (L) 8.5 - 48.6 MG/DL   METABOLIC PANEL, COMPREHENSIVE    Collection Time: 07/21/22  5:08 AM   Result Value Ref Range    Sodium 142 136 - 145 mmol/L    Potassium 5.0 3.5 - 5.1 mmol/L    Chloride 110 (H) 97 - 108 mmol/L    CO2 29 21 - 32 mmol/L    Anion gap 3 (L) 5 - 15 mmol/L    Glucose 110 (H) 65 - 100 mg/dL    BUN 69 (H) 6 - 20 MG/DL    Creatinine 2.70 (H) 0.55 - 1.02 MG/DL    BUN/Creatinine ratio 26 (H) 12 - 20      GFR est AA 20 (L) >60 ml/min/1.73m2    GFR est non-AA 17 (L) >60 ml/min/1.73m2    Calcium 8.2 (L) 8.5 - 10.1 MG/DL    Bilirubin, total 1.5 (H) 0.2 - 1.0 MG/DL    ALT (SGPT) 20 12 - 78 U/L    AST (SGOT) 48 (H) 15 - 37 U/L    Alk.  phosphatase 46 45 - 117 U/L    Protein, total 4.7 (L) 6.4 - 8.2 g/dL    Albumin 1.8 (L) 3.5 - 5.0 g/dL    Globulin 2.9 2.0 - 4.0 g/dL    A-G Ratio 0.6 (L) 1.1 - 2.2         IMAGING RESULTS:  I have personally reviewed the imaging reports      Total time spent with patient: 60minutes ________________________________________________________________________  Care Plan discussed with:  Patient Y   Family     RN Y-Amaury              Consultant:       CT  7/21/2022:  ________________________________________________________________________    ___________________________________________________  Consulting Provider: Krystal Guthrie NP      7/21/2022  10:33 AM

## 2022-07-21 NOTE — PROGRESS NOTES
Consent obtained for procedure by telephone with daughter. Procedure reviewed and all questions answered. Pt tolerated paracentesis well, 2500 ml  of clear yellow fluid removed. Pt without complaints, VSS remain stable throughout procedure. Pt given no sedation or medication, denies pain at this time. Report called to Russ Coreas, all questions answered.

## 2022-07-22 LAB
AMMONIA PLAS-SCNC: 22 UMOL/L
FERRITIN SERPL-MCNC: 81 NG/ML (ref 8–252)
FERRITIN SERPL-MCNC: 97 NG/ML (ref 26–388)
GLUCOSE BLD STRIP.AUTO-MCNC: 153 MG/DL (ref 65–117)
GLUCOSE BLD STRIP.AUTO-MCNC: 174 MG/DL (ref 65–117)
HAV IGM SER QL: NONREACTIVE
HBV CORE IGM SER QL: NONREACTIVE
HBV SURFACE AG SER QL: <0.1 INDEX
HBV SURFACE AG SER QL: NEGATIVE
HCT VFR BLD AUTO: 24.3 % (ref 35–47)
HCV AB SERPL QL IA: NONREACTIVE
HGB BLD-MCNC: 8.2 G/DL (ref 11.5–16)
IRON SATN MFR SERPL: 18 % (ref 20–50)
IRON SERPL-MCNC: 42 UG/DL (ref 35–150)
SERVICE CMNT-IMP: ABNORMAL
SERVICE CMNT-IMP: ABNORMAL
SP1: NORMAL
SP2: NORMAL
SP3: NORMAL
TIBC SERPL-MCNC: 229 UG/DL (ref 250–450)

## 2022-07-22 PROCEDURE — 83540 ASSAY OF IRON: CPT

## 2022-07-22 PROCEDURE — 94760 N-INVAS EAR/PLS OXIMETRY 1: CPT

## 2022-07-22 PROCEDURE — 74011250637 HC RX REV CODE- 250/637: Performed by: INTERNAL MEDICINE

## 2022-07-22 PROCEDURE — 77010033678 HC OXYGEN DAILY

## 2022-07-22 PROCEDURE — 85018 HEMOGLOBIN: CPT

## 2022-07-22 PROCEDURE — 74011250637 HC RX REV CODE- 250/637: Performed by: NURSE PRACTITIONER

## 2022-07-22 PROCEDURE — 65270000046 HC RM TELEMETRY

## 2022-07-22 PROCEDURE — 82962 GLUCOSE BLOOD TEST: CPT

## 2022-07-22 PROCEDURE — 82728 ASSAY OF FERRITIN: CPT

## 2022-07-22 PROCEDURE — 74011000250 HC RX REV CODE- 250: Performed by: INTERNAL MEDICINE

## 2022-07-22 PROCEDURE — 82140 ASSAY OF AMMONIA: CPT

## 2022-07-22 PROCEDURE — 36415 COLL VENOUS BLD VENIPUNCTURE: CPT

## 2022-07-22 PROCEDURE — 87040 BLOOD CULTURE FOR BACTERIA: CPT

## 2022-07-22 PROCEDURE — 80074 ACUTE HEPATITIS PANEL: CPT

## 2022-07-22 PROCEDURE — 74011000258 HC RX REV CODE- 258: Performed by: INTERNAL MEDICINE

## 2022-07-22 PROCEDURE — 74011250636 HC RX REV CODE- 250/636: Performed by: INTERNAL MEDICINE

## 2022-07-22 PROCEDURE — C9113 INJ PANTOPRAZOLE SODIUM, VIA: HCPCS | Performed by: INTERNAL MEDICINE

## 2022-07-22 PROCEDURE — 82784 ASSAY IGA/IGD/IGG/IGM EACH: CPT

## 2022-07-22 RX ORDER — SUCRALFATE 1 G/1
1 TABLET ORAL
Status: DISCONTINUED | OUTPATIENT
Start: 2022-07-22 | End: 2022-07-29 | Stop reason: HOSPADM

## 2022-07-22 RX ADMIN — SUCRALFATE 1 G: 1 TABLET ORAL at 17:35

## 2022-07-22 RX ADMIN — PIPERACILLIN AND TAZOBACTAM 3.38 G: 3; .375 INJECTION, POWDER, FOR SOLUTION INTRAVENOUS at 03:48

## 2022-07-22 RX ADMIN — SODIUM CHLORIDE 40 MG: 9 INJECTION, SOLUTION INTRAMUSCULAR; INTRAVENOUS; SUBCUTANEOUS at 20:38

## 2022-07-22 RX ADMIN — ATORVASTATIN CALCIUM 10 MG: 10 TABLET, FILM COATED ORAL at 21:10

## 2022-07-22 RX ADMIN — SODIUM CHLORIDE, PRESERVATIVE FREE 10 ML: 5 INJECTION INTRAVENOUS at 14:00

## 2022-07-22 RX ADMIN — FUROSEMIDE 20 MG: 10 INJECTION, SOLUTION INTRAMUSCULAR; INTRAVENOUS at 09:20

## 2022-07-22 RX ADMIN — CARVEDILOL 3.12 MG: 3.12 TABLET, FILM COATED ORAL at 17:35

## 2022-07-22 RX ADMIN — SODIUM CHLORIDE, PRESERVATIVE FREE 10 ML: 5 INJECTION INTRAVENOUS at 21:11

## 2022-07-22 RX ADMIN — CARVEDILOL 3.12 MG: 3.12 TABLET, FILM COATED ORAL at 09:20

## 2022-07-22 RX ADMIN — PIPERACILLIN AND TAZOBACTAM 3.38 G: 3; .375 INJECTION, POWDER, FOR SOLUTION INTRAVENOUS at 17:35

## 2022-07-22 RX ADMIN — MAGNESIUM OXIDE TAB 400 MG (241.3 MG ELEMENTAL MG) 400 MG: 400 (241.3 MG) TAB at 09:20

## 2022-07-22 RX ADMIN — ACETAMINOPHEN 650 MG: 325 TABLET ORAL at 17:54

## 2022-07-22 RX ADMIN — SODIUM CHLORIDE 40 MG: 9 INJECTION, SOLUTION INTRAMUSCULAR; INTRAVENOUS; SUBCUTANEOUS at 09:20

## 2022-07-22 RX ADMIN — FERROUS SULFATE TAB 325 MG (65 MG ELEMENTAL FE) 325 MG: 325 (65 FE) TAB at 09:20

## 2022-07-22 RX ADMIN — SUCRALFATE 1 G: 1 TABLET ORAL at 21:10

## 2022-07-22 RX ADMIN — TRAZODONE HYDROCHLORIDE 25 MG: 50 TABLET ORAL at 21:10

## 2022-07-22 RX ADMIN — FENOFIBRATE 145 MG: 145 TABLET ORAL at 21:15

## 2022-07-22 RX ADMIN — CITALOPRAM HYDROBROMIDE 20 MG: 20 TABLET ORAL at 17:35

## 2022-07-22 NOTE — ANESTHESIA POSTPROCEDURE EVALUATION
Procedure(s):  ESOPHAGOGASTRODUODENOSCOPY (EGD)  ENDOSCOPIC ARGON PLASMA COAGULATION. total IV anesthesia, general    Anesthesia Post Evaluation        Patient location during evaluation: PACU  Note status: Adequate. Level of consciousness: responsive to verbal stimuli and sleepy but conscious  Pain management: satisfactory to patient  Airway patency: patent  Anesthetic complications: no  Cardiovascular status: acceptable  Respiratory status: acceptable  Hydration status: acceptable  Comments: +Post-Anesthesia Evaluation and Assessment    Patient: Gonzalo Salas MRN: [id number]  SSN: [id number]   YOB: 1940  Age: 78 y.o. Sex: female      Cardiovascular Function/Vital Signs    BP (!) 116/51 (BP 1 Location: Left lower arm, BP Patient Position: Semi fowlers)   Pulse (!) 50   Temp 36.3 °C (97.3 °F)   Resp 16   SpO2 94%     Patient is status post Procedure(s):  ESOPHAGOGASTRODUODENOSCOPY (EGD)  ENDOSCOPIC ARGON PLASMA COAGULATION. Nausea/Vomiting: Controlled. Postoperative hydration reviewed and adequate. Pain:  Pain Scale 1: Numeric (0 - 10) (07/21/22 2028)  Pain Intensity 1: 0 (07/21/22 2028)   Managed. Neurological Status: At baseline. Mental Status and Level of Consciousness: Arousable. Pulmonary Status:   O2 Device: Nasal cannula (07/22/22 0508)   Adequate oxygenation and airway patent. Complications related to anesthesia: None    Post-anesthesia assessment completed. IV Access very tenuous secondary to Anasarca. More invasive access recommended.     Signed By: Yasmine Overton MD    7/22/2022  Post anesthesia nausea and vomiting:  controlled      INITIAL Post-op Vital signs:   Vitals Value Taken Time   /51 07/22/22 0508   Temp 36.3 °C (97.3 °F) 07/22/22 0508   Pulse 50 07/22/22 0508   Resp 16 07/22/22 0508   SpO2 94 % 07/22/22 0508

## 2022-07-22 NOTE — PROGRESS NOTES
Per  primary nurse  of the patient the facility that sent pt to  our hospital  called and reported  that  pt had 1 of 2 sets with gram positice cocci in clusters. the facility did not further clarify  Plan: ordered repeat  blood cx  Primary team to follow with facility in am  for final cx result

## 2022-07-22 NOTE — PROGRESS NOTES
This RN was made aware from pt's sending facility that pt had 1 set of blood culture come back positive for Gram + Cocci in clusters. There were 2 sets. The RN was unable to inform me whether it was the aerobic bottle, anaerobic bottle, or both bottles. MD notified. Per MD, pt will need repeat blood culture. This RN notified that pt has been difficult to obtain blood here and if it was okay to either move up her AM labs or move down her blood culture to tomorrow.     Per MD, okay to draw in the AM.

## 2022-07-22 NOTE — PROGRESS NOTES
Placed a phone call to vascular team to help obtain labs and blood cultures. Awaiting vascular team to obtain labs.

## 2022-07-22 NOTE — PROGRESS NOTES
Care Management:    Transition of Care Plan:    RUR:18%  Disposition:Anticipate SNF. She has been at [facility] for rehab but daughter would like a different SNF at discharge. CM emailed her a list of SNF and she will let us know her choices. Follow up appointments:TBD   DME needed:She has a walker at home , [address] and wheelchair. Family in process of building a ramp. Transportation at Discharge:BLS anticipated to SNF for rehab. [place] or means to access home:   daughter     IM Medicare Letter: Will need second IM letter at discharge. Is patient a BCPI-A Bundle:    N/A       If yes, was Bundle Letter given?:    Is patient a  and connected with the [place]? NO     Caregiver Contact:Danni Shima Shari daughter   Discharge Caregiver contacted prior to discharge? Care Conference needed?:  daughter        Reason for Admission:   GI Bleed                   Admitted from [facility] where she was doing rehab from a fall back in April. RUR Score:    18%              PCP: First and Last name:   Stacey Hunt MD     Name of Practice:    Are you a current patient: Yes   Approximate date of last visit:   Prior to hospitalization in April. Can you participate in a virtual visit if needed:     Do you (patient/family) have any concerns for transition/discharge? Not at this time, anticipates will need more rehab before returning home. Daughter looking at SNF options. Plan for utilizing home health:   TBD. Current Advanced Directive/Advance Care Plan:  Full Code      Healthcare Decision Maker: daughter Jazlyn Moore Management Interventions  PCP Verified by CM: Yes  Mode of Transport at Discharge:  Other (see comment)  Transition of Care Consult (CM Consult): SNF  Support Systems: Child(diane)  Confirm Follow Up Transport: Family  Discharge Location  Patient Expects to be Discharged to[de-identified] Skilled nursing facility     CM spoke with daughter and able to confirm demographics. Patient at baseline lives with daughter and gets around with her walker. She has been for the past two months at [facility] for rehab and was getting ready to discharge home when she came back to the hospital. Daughter would lie rehab at discharge but a different SNF. CM emailed her list of SNF. Goal is for patient to have rehab and than return home. Chart reviewed and CM will cont to follow for discharge needs.      Edgar Nam RN AC [id number]

## 2022-07-22 NOTE — PROGRESS NOTES
End of Shift Note    Bedside shift change report given to [facility] Amber (oncoming nurse) by Taco Jamil (offgoing nurse). Report included the following information SBAR    Shift worked:  7a-7p     Shift summary and any significant changes:       Concerns for physician to address:    Zone phone for oncoming shift:       Activity:  Activity Level: Bed Rest  Number times ambulated in hallways past shift: 0  Number of times OOB to chair past shift: 0    Cardiac:   Cardiac Monitoring: Yes      Cardiac Rhythm: Sinus Javy    Access:   Current line(s): PIV     Genitourinary:   Urinary status: voiding and external catheter    Respiratory:   O2 Device: Nasal cannula  Chronic home O2 use?: NO  Incentive spirometer at bedside: N/A       GI:     Current diet:  ADULT DIET Full Liquid  Passing flatus: YES  Tolerating current diet: NO       Pain Management:   Patient states pain is manageable on current regimen: N/A    Skin:  Zachary Score: 15  Interventions: turn team, increase time out of bed, and foam dressing    Patient Safety:  Fall Score:  Total Score: 3  Interventions: bed/chair alarm, gripper socks, and pt to call before getting OOB  High Fall Risk: Yes    Length of Stay:  Expected LOS: 3d 0h  Actual LOS: 1      Taco Jamil

## 2022-07-22 NOTE — PROGRESS NOTES
This RN gave report to Jose Rebolledo (oncoming RN). All questions answered. Overnight, pt continued to ooze out of her para site, saturating two abd pads two-three times as well as experienced weeping from her LUE    This AM, four different nurses attempted to draw pt's blood with minimal success despite multiple sticks. MD notified. AM RN aware. At handoff, this RN and Jose Rebolledo conducted two-person skin, particularly on pt's weeping LUE, pt's para site, and pt's sacral/coccyx wound. Pt awake at the time and helped to turn.

## 2022-07-22 NOTE — PROGRESS NOTES
GI PROGRESS NOTE   Roman Sanchez, ANGI  [phone] NP in-hospital cell phone M-F until 4:30  After 5pm or on weekends, please call  for physician on call    Ronda Osuna :1940 GPK:[id number]   ATTG: Dr. Michelle Berman  PCP: Karina Hagen MD  Date/Time:  2022 12:41 PM     Primary GI: Dr. Shaquille Quintero    Reason for following: cirrhosis, GI bleed, ascites, thrombocytopenia    Assessment:     -Acute on chronic anemia, drop in hemoglobin  Black stools and hemoccult +, however on PO iron  H/o chronic anemia, last work up  with hemorrhagic antral gastropathy noted s/p 1 hemostatic clip  -cirrhosis, decompensated, ascites, thrombocytopenia  No h/o esophageal varices, none seen on EGD yesterday  New ascites seen, will need paracentesis with labs (total protein, albumin, cell count with diff, culture)  Needs INR STAT, last visit inr was 1.6  Will order liver serologies evaluate for etiology of liver disease  Will order abdominal ultrasound for further evaluation, CT showing ?nodular liver  Plt 93  Paracentesis 2500ml removed no SBP,   SAAG 1.6, total protein . 6, uncomplicated ascites in cirrhosis  MELD 24  -CKD  -Abnormal CT of GI tract  Questionable nodular contour of the liver. GI Hx  -2022 EGD esophagitis at GE junction, severe diffuse erythematous antra gastropathy treated with APC over 75%, erosive duodenitis  - EGD normal esophagus, small HH, normal duodenum. Severe diffuse hemorrhagic antral gastropathy with contact bleeding. Hemostatic clip placed x1 for continued oozing bleeding from bx site with hemostasis achieved. Bx.  - M2A normal with gastritis  - CLN normal  - EGD with hemorrhagic gastritis per     Plan:     -Patient with EGD yesterday, with significant gastropathy, s/p APC treatment.  Will need repeat Egd in 4-6 weeks for further treatment  -full liquid diet  -Avoid NSAIDs  -PPI 40mg BID  -Will add sucralfate  -Liver serologies pending  -Supportive measures per primary team. Monitor for S&S of GI bleed  -Trend hemoglobin, goal >7, transfuse as clinically indicated. -Daily CMP  -Will continue to follow. Please call GI with any further questions or concerns. Thank you! Plan discussed with Dr. Marilia Ann     Subjective:   Patient resting in bed during visit. Having abdominal pain. No nausea or vomiting. Complaint Y/N Description   Abdominal Pain Y Epigastric region   Hematemesis     Hematochezia     Melena     Constipation     Diarrhea     Dyspepsia     Dysphagia     Jaundiced     Nausea/vomiting N      Review of Systems:  Symptom Y/N Comments  Symptom Y/N Comments   Fever/Chills    Chest Pain     Cough    Headaches     Sputum    Joint Pain     SOB/DAO    Pruritis/Rash     Tolerating Diet    Other       Could NOT obtain due to:      Objective:   VITALS:   Last 24hrs VS reviewed since prior progress note. Most recent are:  Visit Vitals  BP (!) 106/50 (BP 1 Location: Left upper arm)   Pulse (!) 51   Temp 97.6 °F (36.4 °C)   Resp 18   SpO2 92%       Intake/Output Summary (Last 24 hours) at 7/22/2022 1241  Last data filed at 7/21/2022 1530  Gross per 24 hour   Intake 200 ml   Output --   Net 200 ml     PHYSICAL EXAM:  General: WD, WN. Alert, cooperative, no acute distress    HEENT: NC, Atraumatic. Anicteric sclerae. Lungs:  CTA Bilaterally. No Wheezing/Rhonchi/Rales. Heart:  Regular  rhythm,  No Rubs, No Gallops  Abdomen: Soft, Non distended, tender epigastric pain. +Bowel sounds, no HSM  Extremities: No c/c/e  Neurologic:  Alert and oriented to self. No acute neurological distress   Psych:   Not anxious nor agitated.     Lab and Radiology Data Reviewed: (see below)    Medications Reviewed: (see below)  PMH/SH reviewed - no change compared to H&P  ________________________________________________________________________  Total time spent with patient: 20minutes ________________________________________________________________________  Care Plan discussed with:  Patient Y Family     RN Sebastian Liang              Consultant:       Virginia Dubin, NP     Procedures: see electronic medical records for all procedures/Xrays and details which were not copied into this note but were reviewed prior to creation of Plan. LABS:  Recent Labs     07/21/22  1252 07/20/22  2342   WBC  --  5.6   HGB 8.4* 8.8*   HCT 26.4* 27.5*   PLT  --  93*     Recent Labs     07/21/22  0508 07/20/22  2342    143   K 5.0 4.6   * 110*   CO2 29 28   BUN 69* 68*   CREA 2.70* 2.72*   * 125*   CA 8.2* 8.3*     Recent Labs     07/21/22  0508   AP 46   TP 4.7*   ALB 1.8*   GLOB 2.9     Recent Labs     07/21/22  1252   INR 1.8*   PTP 17.9*      No results for input(s): FE, TIBC, PSAT, FERR in the last 72 hours. Lab Results   Component Value Date/Time    Folate 11.4 08/10/2013 04:00 AM     No results for input(s): PH, PCO2, PO2 in the last 72 hours. No results for input(s): CPK, CKMB in the last 72 hours.     No lab exists for component: TROPONINI  Lab Results   Component Value Date/Time    Color YELLOW/STRAW 05/20/2022 03:19 PM    Appearance CLEAR 05/20/2022 03:19 PM    Specific gravity 1.018 05/20/2022 03:19 PM    pH (UA) 5.0 05/20/2022 03:19 PM    Protein Negative 05/20/2022 03:19 PM    Glucose Negative 05/20/2022 03:19 PM    Ketone Negative 05/20/2022 03:19 PM    Bilirubin Negative 05/20/2022 03:19 PM    Urobilinogen 0.2 05/20/2022 03:19 PM    Nitrites Negative 05/20/2022 03:19 PM    Leukocyte Esterase Negative 05/20/2022 03:19 PM    Epithelial cells FEW 05/20/2022 03:19 PM    Bacteria Negative 05/20/2022 03:19 PM    WBC 0-4 05/20/2022 03:19 PM    RBC 0-5 05/20/2022 03:19 PM       MEDICATIONS:  Current Facility-Administered Medications   Medication Dose Route Frequency    furosemide (LASIX) injection 20 mg  20 mg IntraVENous DAILY    piperacillin-tazobactam (ZOSYN) 3.375 g in 0.9% sodium chloride (MBP/ADV) 100 mL MBP  3.375 g IntraVENous Q12H    pantoprazole (PROTONIX) 40 mg in 0.9% sodium chloride 10 mL injection  40 mg IntraVENous Q12H    fenofibrate nanocrystallized (TRICOR) tablet 145 mg  145 mg Oral QHS    citalopram (CELEXA) tablet 20 mg  20 mg Oral QPM    traZODone (DESYREL) tablet 25 mg  25 mg Oral QHS    carvediloL (COREG) tablet 3.125 mg  3.125 mg Oral BID WITH MEALS    atorvastatin (LIPITOR) tablet 10 mg  10 mg Oral QHS    ferrous sulfate tablet 325 mg  1 Tablet Oral DAILY WITH BREAKFAST    magnesium oxide (MAG-OX) tablet 400 mg  400 mg Oral DAILY    sodium chloride (NS) flush 5-40 mL  5-40 mL IntraVENous Q8H    acetaminophen (TYLENOL) tablet 650 mg  650 mg Oral Q6H PRN    Or    acetaminophen (TYLENOL) suppository 650 mg  650 mg Rectal Q6H PRN    polyethylene glycol (MIRALAX) packet 17 g  17 g Oral DAILY PRN    ondansetron (ZOFRAN ODT) tablet 4 mg  4 mg Oral Q8H PRN    Or    ondansetron (ZOFRAN) injection 4 mg  4 mg IntraVENous Q6H PRN

## 2022-07-22 NOTE — PROGRESS NOTES
Hospitalist Progress Note    NAME: Kendrick Caruso   :  1940   MRN:  [id number]       Assessment / Plan:    Acute Upper GI bleeding POA- Hb stable at 8.2 today  Acute Blood loss anemia new acute on chronic anemia  -IV Protonix drip started on admission, changed to IV Protonix twice daily  H/H in AM now  -Transfuse as indicated if hemoglobin less than 7 no active  - GI motility for the complaints. - Avoid NSAIDs  - Hemoglobin stable  - Previous EGD in  normal Esophagus. Severe diffuse headache neuropathy with bleeding. S/p EGD - Esophagitis at [facility], Severe Gastropathy s/p APC, Erosive duodenitis  Avoid NSAIDs, cont PPI BID, added carafate  GI following- workup sent for Liver cirrhosis- serologies pending    Acute kidney injury  -Generalized edema and anasarca  -Gentle diuresis  -A dose of albumin infusion to expand intravascular volume was given  - Possible hepato- renal syndrome     liver cirrhosis with Acute on  chronic liver failure- ?etiology   - New ascites seen on presentation  - Paracentesis to be obtained with labs  - Follow INR  - GI is following  - ultrasound with cirrhosis, moderate volume ascites  S/p Paracentesis, 2.5L out, SAAG 1.6, total protein . 6, uncomplicated ascites in cirrhosis, MELD 24         Possible right lower lobe pneumonia POA  -Consolidative process seen at the base of the right lung  -Empiric coverage with Zosyn. De-escalate as appropriate  -send protraction      Other medical comorbidities  Hypertension  Hyperlipidemia  DM type II  History of CVA without residual deficit     DVT prophylaxis: SCD, chemical prophylaxis contraindicated        Estimated discharge date:   Barriers: medical improvement , GI clearance    Code status: Full  Prophylaxis: SCD's  Recommended Disposition: SNF as per daughter     Subjective:     Patient was seen and examined. No acute events overnight. Discussed with RN overnight events.     All patient's questions were answered. \"Feeling better\"    Objective:     VITALS:   Last 24hrs VS reviewed since prior progress note. Most recent are:  Patient Vitals for the past 24 hrs:   Temp Pulse Resp BP SpO2   07/22/22 1517 97.5 °F (36.4 °C) 65 17 (!) 100/49 96 %   07/22/22 1137 97.6 °F (36.4 °C) (!) 51 18 (!) 106/50 92 %   07/22/22 0917 -- (!) 50 -- (!) 112/51 --   07/22/22 0814 97.6 °F (36.4 °C) (!) 52 17 (!) 100/53 94 %   07/22/22 0508 97.3 °F (36.3 °C) (!) 50 16 (!) 116/51 94 %   07/22/22 0236 97.2 °F (36.2 °C) (!) 49 16 (!) 100/59 95 %   07/21/22 2028 -- (!) 50 16 -- 92 %   07/21/22 1959 98.2 °F (36.8 °C) (!) 49 16 (!) 115/54 93 %   07/21/22 1854 -- (!) 48 -- (!) 106/48 --   07/21/22 1658 98 °F (36.7 °C) (!) 50 18 122/61 94 %   07/21/22 1630 -- (!) 51 18 -- 95 %   07/21/22 1625 -- (!) 50 18 (!) 119/47 95 %   07/21/22 1615 -- (!) 51 18 (!) 120/44 95 %   07/21/22 1611 98 °F (36.7 °C) (!) 52 18 (!) 126/51 95 %   07/21/22 1558 -- (!) 56 14 (!) 111/52 98 %   07/21/22 1555 -- (!) 51 17 (!) 115/50 97 %   07/21/22 1552 -- (!) 55 18 101/78 96 %       No intake or output data in the 24 hours ending 07/22/22 1541     I had a face to face encounter and independently examined this patient on 7/22/2022, as outlined below:  PHYSICAL EXAM:  General: WD, WN. Alert, cooperative, no acute distress    EENT:  EOMI. Anicteric sclerae. MMM  Resp:  CTA bilaterally, no wheezing or rales. No accessory muscle use  CV:  Regular  rhythm,  No edema  GI:  Soft, Non distended, Non tender. +Bowel sounds  Neurologic:  EOMs intact. No facial asymmetry. No aphasia or slurred speech. Symmetrical strength, Sensation grossly intact. Alert and oriented X 4.     Psych:   Good insight. Not anxious nor agitated  Skin:  No rashes.   No jaundice    Reviewed most current lab test results and cultures  YES  Reviewed most current radiology test results   YES  Review and summation of old records today    NO  Reviewed patient's current orders and MAR    YES  PMH/SH reviewed - no change compared to H&P  ________________________________________________________________________  Care Plan discussed with:    Comments   Patient x    Family      RN x    Care Manager x    Consultant                       x Multidiciplinary team rounds were held today with , nursing, pharmacist and clinical coordinator. Patient's plan of care was discussed; medications were reviewed and discharge planning was addressed. ________________________________________________________________________  Total Time: 36 mins      Comments   >50% of visit spent in counseling and coordination of care x    ________________________________________________________________________  Alyssa Gr MD     Procedures: see electronic medical records for all procedures/Xrays and details which were not copied into this note but were reviewed prior to creation of Plan. LABS:  I reviewed today's most current labs and imaging studies.   Pertinent labs include:  Recent Labs     07/22/22  1455 07/21/22 1252 07/20/22 2342   WBC  --   --  5.6   HGB 8.2* 8.4* 8.8*   HCT 24.3* 26.4* 27.5*   PLT  --   --  93*       Recent Labs     07/21/22  1252 07/21/22  0508 07/20/22 2342   NA  --  142 143   K  --  5.0 4.6   CL  --  110* 110*   CO2  --  29 28   GLU  --  110* 125*   BUN  --  69* 68*   CREA  --  2.70* 2.72*   CA  --  8.2* 8.3*   ALB  --  1.8*  --    TBILI  --  1.5*  --    ALT  --  20  --    INR 1.8*  --   --          Signed: Alyssa Gr MD

## 2022-07-23 ENCOUNTER — ANESTHESIA EVENT (OUTPATIENT)
Dept: SURGERY | Age: 82
DRG: 377 | End: 2022-07-23
Payer: MEDICARE

## 2022-07-23 ENCOUNTER — APPOINTMENT (OUTPATIENT)
Dept: GENERAL RADIOLOGY | Age: 82
DRG: 377 | End: 2022-07-23
Attending: ANESTHESIOLOGY
Payer: MEDICARE

## 2022-07-23 ENCOUNTER — ANESTHESIA (OUTPATIENT)
Dept: SURGERY | Age: 82
DRG: 377 | End: 2022-07-23
Payer: MEDICARE

## 2022-07-23 LAB
ANION GAP SERPL CALC-SCNC: 7 MMOL/L (ref 5–15)
BUN SERPL-MCNC: 67 MG/DL (ref 6–20)
BUN/CREAT SERPL: 22 (ref 12–20)
CALCIUM SERPL-MCNC: 7.6 MG/DL (ref 8.5–10.1)
CHLORIDE SERPL-SCNC: 111 MMOL/L (ref 97–108)
CO2 SERPL-SCNC: 25 MMOL/L (ref 21–32)
CREAT SERPL-MCNC: 3.05 MG/DL (ref 0.55–1.02)
ERYTHROCYTE [DISTWIDTH] IN BLOOD BY AUTOMATED COUNT: 16.6 % (ref 11.5–14.5)
GLUCOSE SERPL-MCNC: 185 MG/DL (ref 65–100)
HCT VFR BLD AUTO: 22.7 % (ref 35–47)
HGB BLD-MCNC: 7.5 G/DL (ref 11.5–16)
IGG SERPL-MCNC: 1300 MG/DL (ref 700–1600)
MCH RBC QN AUTO: 32.3 PG (ref 26–34)
MCHC RBC AUTO-ENTMCNC: 33 G/DL (ref 30–36.5)
MCV RBC AUTO: 97.8 FL (ref 80–99)
NRBC # BLD: 0.02 K/UL (ref 0–0.01)
NRBC BLD-RTO: 0.3 PER 100 WBC
PLATELET # BLD AUTO: 65 K/UL (ref 150–400)
PMV BLD AUTO: 10.7 FL (ref 8.9–12.9)
POTASSIUM SERPL-SCNC: 3.6 MMOL/L (ref 3.5–5.1)
RBC # BLD AUTO: 2.32 M/UL (ref 3.8–5.2)
SODIUM SERPL-SCNC: 143 MMOL/L (ref 136–145)
WBC # BLD AUTO: 6.7 K/UL (ref 3.6–11)

## 2022-07-23 PROCEDURE — 74011250637 HC RX REV CODE- 250/637: Performed by: INTERNAL MEDICINE

## 2022-07-23 PROCEDURE — 85027 COMPLETE CBC AUTOMATED: CPT

## 2022-07-23 PROCEDURE — 71045 X-RAY EXAM CHEST 1 VIEW: CPT

## 2022-07-23 PROCEDURE — 74011000258 HC RX REV CODE- 258: Performed by: INTERNAL MEDICINE

## 2022-07-23 PROCEDURE — 05HM33Z INSERTION OF INFUSION DEVICE INTO RIGHT INTERNAL JUGULAR VEIN, PERCUTANEOUS APPROACH: ICD-10-PCS | Performed by: INTERNAL MEDICINE

## 2022-07-23 PROCEDURE — 74011000250 HC RX REV CODE- 250: Performed by: INTERNAL MEDICINE

## 2022-07-23 PROCEDURE — 65270000046 HC RM TELEMETRY

## 2022-07-23 PROCEDURE — 74011250636 HC RX REV CODE- 250/636: Performed by: INTERNAL MEDICINE

## 2022-07-23 PROCEDURE — 74011250637 HC RX REV CODE- 250/637: Performed by: NURSE PRACTITIONER

## 2022-07-23 PROCEDURE — 77010033678 HC OXYGEN DAILY

## 2022-07-23 PROCEDURE — 30233N1 TRANSFUSION OF NONAUTOLOGOUS RED BLOOD CELLS INTO PERIPHERAL VEIN, PERCUTANEOUS APPROACH: ICD-10-PCS | Performed by: INTERNAL MEDICINE

## 2022-07-23 PROCEDURE — 36415 COLL VENOUS BLD VENIPUNCTURE: CPT

## 2022-07-23 PROCEDURE — 36600 WITHDRAWAL OF ARTERIAL BLOOD: CPT

## 2022-07-23 PROCEDURE — 94760 N-INVAS EAR/PLS OXIMETRY 1: CPT

## 2022-07-23 PROCEDURE — 80048 BASIC METABOLIC PNL TOTAL CA: CPT

## 2022-07-23 PROCEDURE — C9113 INJ PANTOPRAZOLE SODIUM, VIA: HCPCS | Performed by: INTERNAL MEDICINE

## 2022-07-23 PROCEDURE — 36556 INSERT NON-TUNNEL CV CATH: CPT

## 2022-07-23 RX ORDER — PANTOPRAZOLE SODIUM 40 MG/1
40 TABLET, DELAYED RELEASE ORAL
Status: DISCONTINUED | OUTPATIENT
Start: 2022-07-23 | End: 2022-07-29 | Stop reason: HOSPADM

## 2022-07-23 RX ADMIN — SODIUM CHLORIDE 40 MG: 9 INJECTION, SOLUTION INTRAMUSCULAR; INTRAVENOUS; SUBCUTANEOUS at 17:22

## 2022-07-23 RX ADMIN — SUCRALFATE 1 G: 1 TABLET ORAL at 17:56

## 2022-07-23 RX ADMIN — SUCRALFATE 1 G: 1 TABLET ORAL at 10:42

## 2022-07-23 RX ADMIN — CITALOPRAM HYDROBROMIDE 20 MG: 20 TABLET ORAL at 17:56

## 2022-07-23 RX ADMIN — ONDANSETRON 4 MG: 2 INJECTION INTRAMUSCULAR; INTRAVENOUS at 18:01

## 2022-07-23 RX ADMIN — CARVEDILOL 3.12 MG: 3.12 TABLET, FILM COATED ORAL at 10:42

## 2022-07-23 RX ADMIN — SODIUM CHLORIDE, PRESERVATIVE FREE 10 ML: 5 INJECTION INTRAVENOUS at 21:34

## 2022-07-23 RX ADMIN — SODIUM CHLORIDE, PRESERVATIVE FREE 10 ML: 5 INJECTION INTRAVENOUS at 14:00

## 2022-07-23 RX ADMIN — MAGNESIUM OXIDE TAB 400 MG (241.3 MG ELEMENTAL MG) 400 MG: 400 (241.3 MG) TAB at 10:42

## 2022-07-23 RX ADMIN — PIPERACILLIN AND TAZOBACTAM 3.38 G: 3; .375 INJECTION, POWDER, FOR SOLUTION INTRAVENOUS at 03:13

## 2022-07-23 RX ADMIN — CARVEDILOL 3.12 MG: 3.12 TABLET, FILM COATED ORAL at 17:56

## 2022-07-23 RX ADMIN — PIPERACILLIN AND TAZOBACTAM 3.38 G: 3; .375 INJECTION, POWDER, FOR SOLUTION INTRAVENOUS at 17:55

## 2022-07-23 RX ADMIN — FUROSEMIDE 20 MG: 10 INJECTION, SOLUTION INTRAMUSCULAR; INTRAVENOUS at 17:17

## 2022-07-23 RX ADMIN — SODIUM CHLORIDE, PRESERVATIVE FREE 10 ML: 5 INJECTION INTRAVENOUS at 05:22

## 2022-07-23 RX ADMIN — SUCRALFATE 1 G: 1 TABLET ORAL at 21:33

## 2022-07-23 RX ADMIN — TRAZODONE HYDROCHLORIDE 25 MG: 50 TABLET ORAL at 21:33

## 2022-07-23 RX ADMIN — SODIUM CHLORIDE 40 MG: 9 INJECTION, SOLUTION INTRAMUSCULAR; INTRAVENOUS; SUBCUTANEOUS at 10:41

## 2022-07-23 RX ADMIN — ATORVASTATIN CALCIUM 10 MG: 10 TABLET, FILM COATED ORAL at 21:33

## 2022-07-23 RX ADMIN — FERROUS SULFATE TAB 325 MG (65 MG ELEMENTAL FE) 325 MG: 325 (65 FE) TAB at 10:42

## 2022-07-23 RX ADMIN — FENOFIBRATE 145 MG: 145 TABLET ORAL at 21:33

## 2022-07-23 NOTE — ANESTHESIA PROCEDURE NOTES
Central Line Placement    Start time: 7/23/2022 1:55 PM  End time: 7/23/2022 2:06 PM  Performed by: Jonny Aburto MD  Authorized by: Jonny Aburto MD     Indications: vascular access  Preanesthetic Checklist: patient identified, risks and benefits discussed, anesthesia consent, site marked, patient being monitored, timeout performed and fire risk safety assessment completed and verbalized    Timeout Time: 13:55 EDT       Pre-procedure: All elements of maximal sterile barrier technique followed?  Yes    2% Chlorhexidine for cutaneous antisepsis, Hand hygiene performed prior to catheter insertion and Ultrasound guidance    Sterile Ultrasound Technique followed?: Yes            Procedure:   Prep:  ChloraPrep  Location: internal jugular  Orientation:  Right  Patient position:  Trendelenburg  Catheter type:  Quad lumen  Catheter size:  16 G  Catheter length:  16 cm  Number of attempts:  1  Successful placement: Yes      Assessment:   Post-procedure:  Catheter secured, sterile dressing applied and sterile dressing with CHG applied  Assessment:  Blood return through all ports, placement verified by x-ray and guidewire removal verified  Insertion:  Uncomplicated  Patient tolerance:  Patient tolerated the procedure well with no immediate complications

## 2022-07-23 NOTE — ANESTHESIA POSTPROCEDURE EVALUATION
Procedure(s):  SPECIAL PROCEDURE OUTSIDE OF OR. No value filed. Anesthesia Post Evaluation      Multimodal analgesia: multimodal analgesia used between 6 hours prior to anesthesia start to PACU discharge  Patient location during evaluation: PACU  Patient participation: complete - patient participated  Level of consciousness: sleepy but conscious and responsive to verbal stimuli  Pain score: 0  Pain management: adequate  Airway patency: patent  Anesthetic complications: no  Cardiovascular status: acceptable  Respiratory status: acceptable  Hydration status: acceptable  Comments: +Post-Anesthesia Evaluation and Assessment    Patient: Manda Finch MRN: [id number]  SSN: [id number]   YOB: 1940  Age: 78 y.o. Sex: female      Cardiovascular Function/Vital Signs    BP (!) 95/44   Pulse (!) 46   Temp 36.6 °C (97.8 °F)   Resp 18   SpO2 99%     Patient is status post Procedure(s):  SPECIAL PROCEDURE OUTSIDE OF OR. Nausea/Vomiting: Controlled. Postoperative hydration reviewed and adequate. Pain:  Pain Scale 1: (P) Numeric (0 - 10) (07/23/22 1400)  Pain Intensity 1: (P) 0 (07/23/22 1400)   Managed. Neurological Status: At baseline. Mental Status and Level of Consciousness: Arousable. Pulmonary Status:   O2 Device: (P) Nasal cannula (07/23/22 1400)   Adequate oxygenation and airway patent. Complications related to anesthesia: None    Post-anesthesia assessment completed. No concerns. Signed By: Daina Romeo MD    7/23/2022  Post anesthesia nausea and vomiting:  controlled  Final Post Anesthesia Temperature Assessment:  Normothermia (36.0-37.5 degrees C)      INITIAL Post-op Vital signs: No vitals data found for the desired time range.

## 2022-07-23 NOTE — PERIOP NOTES
Patient transported to PACU for [facility] Placement. Dr. Song Albert reviewed procedure with patient and allowed time for questions and answers. Time out performed at 1348.      1406 Transported back to room. Updated primary RN. Discussed that Chest X-ray should be performed prior to use.

## 2022-07-23 NOTE — ANESTHESIA PREPROCEDURE EVALUATION
Relevant Problems   NEUROLOGY   (+) Stroke (HCC)      CARDIOVASCULAR   (+) HTN (hypertension)      ENDOCRINE   (+) DM (diabetes mellitus) (HCC)      HEMATOLOGY   (+) Anemia, unspecified   (+) Severe anemia   (+) Symptomatic anemia       Anesthetic History   No history of anesthetic complications            Review of Systems / Medical History  Patient summary reviewed, nursing notes reviewed and pertinent labs reviewed    Pulmonary  Within defined limits                 Neuro/Psych       CVA  TIA and psychiatric history     Cardiovascular    Hypertension              Exercise tolerance: >4 METS     GI/Hepatic/Renal     GERD           Endo/Other    Diabetes         Other Findings              Physical Exam    Airway  Mallampati: II  TM Distance: 4 - 6 cm  Neck ROM: normal range of motion   Mouth opening: Normal     Cardiovascular  Regular rate and rhythm,  S1 and S2 normal,  no murmur, click, rub, or gallop  Rhythm: regular  Rate: normal         Dental  No notable dental hx       Pulmonary  Breath sounds clear to auscultation               Abdominal  GI exam deferred       Other Findings            Anesthetic Plan    ASA: 3  Anesthesia type: MAC          Induction: Intravenous  Anesthetic plan and risks discussed with: Patient

## 2022-07-23 NOTE — PROGRESS NOTES
Patient  without iv access since this morning, reported to MD.  Verbal order to have IV team re-evaluate. IV team re-evaluated made recommendations for patient to receive CVC due to poor venous access. Patient edematous bilateral arms. Reported to MD received order for CVC. POA, patient's daughter called and consented to procedure. Per report, patient tolerated well. Post CVC portable chest x-ray complete. See report.

## 2022-07-23 NOTE — PERIOP NOTES
TRANSFER - IN REPORT:    Verbal report received from Primary RN(name) on Daniel Canseco  being received from 2137(unit) for ordered procedure      Report consisted of patients Situation, Background, Assessment and   Recommendations(SBAR). Information from the following report(s) SBAR, Kardex, ED Summary, OR Summary, Procedure Summary, Intake/Output, MAR, Accordion, Recent Results, Med Rec Status, Cardiac Rhythm SB, Alarm Parameters , Pre Procedure Checklist, Procedure Verification, and Quality Measures was reviewed with the receiving nurse. Opportunity for questions and clarification was provided. Assessment completed upon patients arrival to unit and care assumed.

## 2022-07-23 NOTE — PROGRESS NOTES
Hospitalist Progress Note    NAME: Curtis Marrufo   :  1940   MRN:  [id number]       Assessment / Plan:    Acute Upper GI bleeding POA- Hb stable at 8.2 yesterday- CBC pending today AM  Acute Blood loss anemia new acute on chronic anemia POA- no new evidence of ongoing bleeding, likely was slow oozing  -IV Protonix drip started on admission, changed to IV Protonix twice daily- plan to change to PO soon if eating consistently today  CBC stat followed by daily  -Transfuse as indicated if hemoglobin less than 7 no active  - GI motility for the complaints. - Avoid NSAIDs  - Hemoglobin stable- last 8.2  - Previous EGD in  normal Esophagus. Severe diffuse headache neuropathy with bleeding. S/p EGD - Esophagitis at [address], Severe Gastropathy s/p APC, Erosive duodenitis  Avoid NSAIDs, cont PPI BID, added carafate- cont for now  GI following- workup sent for Liver cirrhosis- serologies still pending    Acute kidney injury  -Generalized edema and anasarca  Cont Gentle diuresis with lasix 20 mg daily  -A dose of albumin infusion to expand intravascular volume was given  - Possible hepato- renal syndrome  Check BMP daily- stat today     liver cirrhosis with Acute on  chronic liver failure- ?etiology   - New ascites seen on presentation  - Paracentesis to be obtained with labs  - Follow INR- 1.8   - GI is following  - ultrasound with cirrhosis, moderate volume ascites  S/p Paracentesis, 2.5L out, SAAG 1.6, total protein . 6, uncomplicated ascites in cirrhosis, MELD 24  Cont lasix for now  Workup pending for etiology? Of cirrhosis         Possible right lower lobe pneumonia POA  -Consolidative process seen at the base of the right lung  -Empiric coverage with Zosyn.   De-escalate as appropriate  -send protraction      Other medical comorbidities  Hypertension  Hyperlipidemia  DM type II  History of CVA without residual deficit     DVT prophylaxis: SCD, chemical prophylaxis contraindicated        Estimated discharge date: July 25  Barriers: medical improvement , GI clearance    Code status: Full  Prophylaxis: SCD's  Recommended Disposition: NEW SNF as per daughter for short term then eventual home      Subjective:     Patient was seen and examined. No acute events overnight. Discussed with RN overnight events. All patient's questions were answered. \"Feeling better\"    Objective:     VITALS:   Last 24hrs VS reviewed since prior progress note. Most recent are:  Patient Vitals for the past 24 hrs:   Temp Pulse Resp BP SpO2   07/23/22 0300 97.8 °F (36.6 °C) (!) 52 19 (!) 109/55 96 %   07/22/22 2333 97.4 °F (36.3 °C) (!) 55 18 (!) 104/54 96 %   07/22/22 2000 97.3 °F (36.3 °C) (!) 55 18 (!) 100/51 97 %   07/22/22 1517 97.5 °F (36.4 °C) 65 17 (!) 100/49 96 %   07/22/22 1137 97.6 °F (36.4 °C) (!) 51 18 (!) 106/50 92 %         Intake/Output Summary (Last 24 hours) at 7/23/2022 0940  Last data filed at 7/23/2022 0350  Gross per 24 hour   Intake --   Output 701 ml   Net -701 ml          I had a face to face encounter and independently examined this patient on 7/23/2022, as outlined below:  PHYSICAL EXAM:  General: WD, WN. Alert, cooperative, no acute distress    EENT:  EOMI. Anicteric sclerae. MMM  Resp:  CTA bilaterally, no wheezing or rales. No accessory muscle use  CV:  Regular  rhythm,  No edema  GI:  Soft, Non distended, Non tender. +Bowel sounds  Neurologic:  EOMs intact. No facial asymmetry. No aphasia or slurred speech. Symmetrical strength, Sensation grossly intact. Alert and oriented X 4.     Psych:   Good insight. Not anxious nor agitated  Skin:  No rashes.   No jaundice    Reviewed most current lab test results and cultures  YES  Reviewed most current radiology test results   YES  Review and summation of old records today    NO  Reviewed patient's current orders and MAR    YES  PMH/SH reviewed - no change compared to H&P  ________________________________________________________________________  Care Plan discussed with:    Comments   Patient x    Family      RN x    Care Manager x    Consultant                       x Multidiciplinary team rounds were held today with , nursing, pharmacist and clinical coordinator. Patient's plan of care was discussed; medications were reviewed and discharge planning was addressed. ________________________________________________________________________  Total Time: 26 mins      Comments   >50% of visit spent in counseling and coordination of care x    ________________________________________________________________________  Kelsie Sharma MD     Procedures: see electronic medical records for all procedures/Xrays and details which were not copied into this note but were reviewed prior to creation of Plan. LABS:  I reviewed today's most current labs and imaging studies.   Pertinent labs include:  Recent Labs     07/22/22  1455 07/21/22  1252 07/20/22  2342   WBC  --   --  5.6   HGB 8.2* 8.4* 8.8*   HCT 24.3* 26.4* 27.5*   PLT  --   --  93*       Recent Labs     07/21/22  1252 07/21/22  0508 07/20/22  2342   NA  --  142 143   K  --  5.0 4.6   CL  --  110* 110*   CO2  --  29 28   GLU  --  110* 125*   BUN  --  69* 68*   CREA  --  2.70* 2.72*   CA  --  8.2* 8.3*   ALB  --  1.8*  --    TBILI  --  1.5*  --    ALT  --  20  --    INR 1.8*  --   --          Signed: Kelsie Sharma MD

## 2022-07-24 LAB
ANION GAP SERPL CALC-SCNC: 4 MMOL/L (ref 5–15)
BUN SERPL-MCNC: 68 MG/DL (ref 6–20)
BUN/CREAT SERPL: 23 (ref 12–20)
CALCIUM SERPL-MCNC: 7.8 MG/DL (ref 8.5–10.1)
CHLORIDE SERPL-SCNC: 111 MMOL/L (ref 97–108)
CO2 SERPL-SCNC: 28 MMOL/L (ref 21–32)
CREAT SERPL-MCNC: 2.91 MG/DL (ref 0.55–1.02)
ERYTHROCYTE [DISTWIDTH] IN BLOOD BY AUTOMATED COUNT: 16.7 % (ref 11.5–14.5)
GLUCOSE SERPL-MCNC: 169 MG/DL (ref 65–100)
HCT VFR BLD AUTO: 23.5 % (ref 35–47)
HGB BLD-MCNC: 7.6 G/DL (ref 11.5–16)
MCH RBC QN AUTO: 32.2 PG (ref 26–34)
MCHC RBC AUTO-ENTMCNC: 32.3 G/DL (ref 30–36.5)
MCV RBC AUTO: 99.6 FL (ref 80–99)
NRBC # BLD: 0 K/UL (ref 0–0.01)
NRBC BLD-RTO: 0 PER 100 WBC
PLATELET # BLD AUTO: 63 K/UL (ref 150–400)
PMV BLD AUTO: 10.7 FL (ref 8.9–12.9)
POTASSIUM SERPL-SCNC: 3.5 MMOL/L (ref 3.5–5.1)
RBC # BLD AUTO: 2.36 M/UL (ref 3.8–5.2)
SODIUM SERPL-SCNC: 143 MMOL/L (ref 136–145)
WBC # BLD AUTO: 7.3 K/UL (ref 3.6–11)

## 2022-07-24 PROCEDURE — 74011250636 HC RX REV CODE- 250/636: Performed by: INTERNAL MEDICINE

## 2022-07-24 PROCEDURE — 74011250637 HC RX REV CODE- 250/637: Performed by: INTERNAL MEDICINE

## 2022-07-24 PROCEDURE — 94760 N-INVAS EAR/PLS OXIMETRY 1: CPT

## 2022-07-24 PROCEDURE — 80048 BASIC METABOLIC PNL TOTAL CA: CPT

## 2022-07-24 PROCEDURE — 86038 ANTINUCLEAR ANTIBODIES: CPT

## 2022-07-24 PROCEDURE — 85027 COMPLETE CBC AUTOMATED: CPT

## 2022-07-24 PROCEDURE — 36415 COLL VENOUS BLD VENIPUNCTURE: CPT

## 2022-07-24 PROCEDURE — P9047 ALBUMIN (HUMAN), 25%, 50ML: HCPCS | Performed by: INTERNAL MEDICINE

## 2022-07-24 PROCEDURE — 74011000258 HC RX REV CODE- 258: Performed by: INTERNAL MEDICINE

## 2022-07-24 PROCEDURE — 82390 ASSAY OF CERULOPLASMIN: CPT

## 2022-07-24 PROCEDURE — 82103 ALPHA-1-ANTITRYPSIN TOTAL: CPT

## 2022-07-24 PROCEDURE — 86015 ACTIN ANTIBODY EACH: CPT

## 2022-07-24 PROCEDURE — 86381 MITOCHONDRIAL ANTIBODY EACH: CPT

## 2022-07-24 PROCEDURE — 74011000250 HC RX REV CODE- 250: Performed by: INTERNAL MEDICINE

## 2022-07-24 PROCEDURE — 65270000046 HC RM TELEMETRY

## 2022-07-24 PROCEDURE — 77010033678 HC OXYGEN DAILY

## 2022-07-24 PROCEDURE — 74011250637 HC RX REV CODE- 250/637: Performed by: NURSE PRACTITIONER

## 2022-07-24 RX ORDER — FUROSEMIDE 10 MG/ML
40 INJECTION INTRAMUSCULAR; INTRAVENOUS DAILY
Status: DISCONTINUED | OUTPATIENT
Start: 2022-07-24 | End: 2022-07-29 | Stop reason: HOSPADM

## 2022-07-24 RX ORDER — ALBUMIN HUMAN 250 G/1000ML
12.5 SOLUTION INTRAVENOUS ONCE
Status: COMPLETED | OUTPATIENT
Start: 2022-07-24 | End: 2022-07-24

## 2022-07-24 RX ADMIN — CARVEDILOL 3.12 MG: 3.12 TABLET, FILM COATED ORAL at 16:50

## 2022-07-24 RX ADMIN — SODIUM CHLORIDE, PRESERVATIVE FREE 10 ML: 5 INJECTION INTRAVENOUS at 16:50

## 2022-07-24 RX ADMIN — ALBUMIN (HUMAN) 12.5 G: 0.25 INJECTION, SOLUTION INTRAVENOUS at 12:04

## 2022-07-24 RX ADMIN — FENOFIBRATE 145 MG: 145 TABLET ORAL at 22:48

## 2022-07-24 RX ADMIN — MAGNESIUM OXIDE TAB 400 MG (241.3 MG ELEMENTAL MG) 400 MG: 400 (241.3 MG) TAB at 12:05

## 2022-07-24 RX ADMIN — SUCRALFATE 1 G: 1 TABLET ORAL at 12:05

## 2022-07-24 RX ADMIN — FUROSEMIDE 40 MG: 10 INJECTION, SOLUTION INTRAMUSCULAR; INTRAVENOUS at 12:04

## 2022-07-24 RX ADMIN — SODIUM CHLORIDE, PRESERVATIVE FREE 10 ML: 5 INJECTION INTRAVENOUS at 05:28

## 2022-07-24 RX ADMIN — CARVEDILOL 3.12 MG: 3.12 TABLET, FILM COATED ORAL at 12:05

## 2022-07-24 RX ADMIN — PIPERACILLIN AND TAZOBACTAM 3.38 G: 3; .375 INJECTION, POWDER, FOR SOLUTION INTRAVENOUS at 03:48

## 2022-07-24 RX ADMIN — PANTOPRAZOLE SODIUM 40 MG: 40 TABLET, DELAYED RELEASE ORAL at 16:53

## 2022-07-24 RX ADMIN — TRAZODONE HYDROCHLORIDE 25 MG: 50 TABLET ORAL at 22:47

## 2022-07-24 RX ADMIN — SUCRALFATE 1 G: 1 TABLET ORAL at 22:47

## 2022-07-24 RX ADMIN — ATORVASTATIN CALCIUM 10 MG: 10 TABLET, FILM COATED ORAL at 22:48

## 2022-07-24 RX ADMIN — SUCRALFATE 1 G: 1 TABLET ORAL at 16:50

## 2022-07-24 RX ADMIN — FERROUS SULFATE TAB 325 MG (65 MG ELEMENTAL FE) 325 MG: 325 (65 FE) TAB at 12:05

## 2022-07-24 RX ADMIN — SODIUM CHLORIDE, PRESERVATIVE FREE 40 ML: 5 INJECTION INTRAVENOUS at 22:47

## 2022-07-24 RX ADMIN — CITALOPRAM HYDROBROMIDE 20 MG: 20 TABLET ORAL at 18:40

## 2022-07-24 RX ADMIN — ONDANSETRON 4 MG: 2 INJECTION INTRAMUSCULAR; INTRAVENOUS at 17:00

## 2022-07-24 NOTE — PROGRESS NOTES
Hospitalist Progress Note    NAME: Shawanda Pardo   :  1940   MRN:  [id number]       Assessment / Plan:    Acute Upper GI bleeding POA- Hb trended down to 7.5, 7.6 today AM, stable  Acute Blood loss anemia new acute on chronic anemia POA- no new evidence of ongoing bleeding, likely was slow oozing  -IV Protonix drip started on admission, changed to IV Protonix twice daily- plan to change to PO soon if eating consistently today  CBC stat followed by daily  -Transfuse as indicated if hemoglobin less than 7 no active  - GI motility for the complaints. - Avoid NSAIDs  - Hemoglobin stable- 7.6 today AM  - Previous EGD in  normal Esophagus. Severe diffuse headache neuropathy with bleeding. S/p EGD - Esophagitis at [address], Severe Gastropathy s/p APC, Erosive duodenitis  Avoid NSAIDs, cont PPI BID, added carafate- cont for now  GI following- workup sent for Liver cirrhosis- serologies still pending  Inadequate IV Access lead to CVC placement by Anaesthesia ()-- awaiting pending blood/serology per GI to be sent    Acute kidney injury  -Generalized edema and anasarca  S/p gentle diuresis with lasix 20 mg daily-- increase dose to 40 mg daily today as Cr stable at 2.9, monitor BMP closely with IV diuresis  S/p albumin infusion x1, will give 1 more dose today with diuresis  - Possible hepato- renal syndrome  Check BMP daily     liver cirrhosis with Acute on  chronic liver failure- ?etiology   - New ascites seen on presentation  - Paracentesis to be obtained with labs  - Follow INR- 1.8   - GI is following  - ultrasound with cirrhosis, moderate volume ascites  S/p Paracentesis, 2.5L out, SAAG 1.6, total protein . 6, uncomplicated ascites in cirrhosis, MELD 24  Cont lasix for now- increased dose to 40 mg daily today  Workup pending for etiology?  Of cirrhosis  Ascitic Fluid Cx neg x 3 days now  DC Empiric IV Abx today         Possible right lower lobe pneumonia POA  -Consolidative process seen at the base of the right lung  S/p IV Zosyn. DC today as cultures remain negative  Blood Cx (7/22) neg x 2 days     Other medical comorbidities  Hypertension  Hyperlipidemia  DM type II  History of CVA without residual deficit     DVT prophylaxis: SCD, chemical prophylaxis contraindicated        Estimated discharge date: July ? 25-26  Barriers: medical improvement , GI clearance    Code status: Full  Prophylaxis: SCD's  Recommended Disposition: NEW SNF as per daughter for short term then eventual home      Subjective:     Patient was seen and examined. No acute events overnight. Discussed with RN overnight events. All patient's questions were answered. \"Feeling better\"    Objective:     VITALS:   Last 24hrs VS reviewed since prior progress note. Most recent are:  Patient Vitals for the past 24 hrs:   Temp Pulse Resp BP SpO2   07/24/22 0300 97.8 °F (36.6 °C) (!) 50 18 (!) 104/52 99 %   07/23/22 2329 97.5 °F (36.4 °C) (!) 50 18 126/60 100 %   07/23/22 1930 97.5 °F (36.4 °C) (!) 53 18 (!) 101/55 100 %   07/23/22 1717 -- (!) 46 18 112/65 98 %   07/23/22 1532 97.8 °F (36.6 °C) (!) 46 18 (!) 95/44 99 %   07/23/22 1400 -- (!) 46 22 (!) 99/30 97 %   07/23/22 1350 -- (!) 47 12 (!) 101/37 97 %   07/23/22 1104 97.4 °F (36.3 °C) (!) 50 19 (!) 109/53 100 %   07/23/22 0900 97.3 °F (36.3 °C) (!) 48 19 (!) 102/58 --         Intake/Output Summary (Last 24 hours) at 7/24/2022 0801  Last data filed at 7/24/2022 0354  Gross per 24 hour   Intake 240 ml   Output 650 ml   Net -410 ml          I had a face to face encounter and independently examined this patient on 7/24/2022, as outlined below:  PHYSICAL EXAM:  General: WD, WN. Alert, cooperative, no acute distress    EENT:  EOMI. Anicteric sclerae. MMM  Resp:  CTA bilaterally, no wheezing or rales. No accessory muscle use  CV:  Regular  rhythm,  No edema  GI:  Soft, Non distended, Non tender. +Bowel sounds  Neurologic:  EOMs intact. No facial asymmetry. No aphasia or slurred speech. Symmetrical strength, Sensation grossly intact. Alert and oriented X 4.     Psych:   Good insight. Not anxious nor agitated  Skin:  No rashes. No jaundice    Reviewed most current lab test results and cultures  YES  Reviewed most current radiology test results   YES  Review and summation of old records today    NO  Reviewed patient's current orders and MAR    YES  PMH/SH reviewed - no change compared to H&P  ________________________________________________________________________  Care Plan discussed with:    Comments   Patient x    Family      RN x    Care Manager x    Consultant                       x Multidiciplinary team rounds were held today with , nursing, pharmacist and clinical coordinator. Patient's plan of care was discussed; medications were reviewed and discharge planning was addressed. ________________________________________________________________________  Total Time: 26 mins      Comments   >50% of visit spent in counseling and coordination of care x    ________________________________________________________________________  Corey Philippe MD     Procedures: see electronic medical records for all procedures/Xrays and details which were not copied into this note but were reviewed prior to creation of Plan. LABS:  I reviewed today's most current labs and imaging studies.   Pertinent labs include:  Recent Labs     07/24/22 0313 07/23/22  1315 07/22/22  1455   WBC 7.3 6.7  --    HGB 7.6* 7.5* 8.2*   HCT 23.5* 22.7* 24.3*   PLT 63* 65*  --        Recent Labs     07/24/22 0313 07/23/22  1315 07/21/22  1252    143  --    K 3.5 3.6  --    * 111*  --    CO2 28 25  --    * 185*  --    BUN 68* 67*  --    CREA 2.91* 3.05*  --    CA 7.8* 7.6*  --    INR  --   --  1.8*         Signed: Corey Philippe MD

## 2022-07-25 LAB
ANION GAP SERPL CALC-SCNC: 5 MMOL/L (ref 5–15)
BACTERIA SPEC CULT: NORMAL
BUN SERPL-MCNC: 67 MG/DL (ref 6–20)
BUN/CREAT SERPL: 24 (ref 12–20)
CALCIUM SERPL-MCNC: 7.8 MG/DL (ref 8.5–10.1)
CHLORIDE SERPL-SCNC: 112 MMOL/L (ref 97–108)
CO2 SERPL-SCNC: 28 MMOL/L (ref 21–32)
CREAT SERPL-MCNC: 2.84 MG/DL (ref 0.55–1.02)
ERYTHROCYTE [DISTWIDTH] IN BLOOD BY AUTOMATED COUNT: 16.7 % (ref 11.5–14.5)
GLUCOSE SERPL-MCNC: 145 MG/DL (ref 65–100)
GRAM STN SPEC: NORMAL
GRAM STN SPEC: NORMAL
HCT VFR BLD AUTO: 21.8 % (ref 35–47)
HGB BLD-MCNC: 6.9 G/DL (ref 11.5–16)
HISTORY CHECKED?,CKHIST: NORMAL
MCH RBC QN AUTO: 31.5 PG (ref 26–34)
MCHC RBC AUTO-ENTMCNC: 31.7 G/DL (ref 30–36.5)
MCV RBC AUTO: 99.5 FL (ref 80–99)
NRBC # BLD: 0 K/UL (ref 0–0.01)
NRBC BLD-RTO: 0 PER 100 WBC
PLATELET # BLD AUTO: 57 K/UL (ref 150–400)
PMV BLD AUTO: 12.2 FL (ref 8.9–12.9)
POTASSIUM SERPL-SCNC: 3.2 MMOL/L (ref 3.5–5.1)
RBC # BLD AUTO: 2.19 M/UL (ref 3.8–5.2)
SERVICE CMNT-IMP: NORMAL
SODIUM SERPL-SCNC: 145 MMOL/L (ref 136–145)
WBC # BLD AUTO: 5.8 K/UL (ref 3.6–11)

## 2022-07-25 PROCEDURE — 86900 BLOOD TYPING SEROLOGIC ABO: CPT

## 2022-07-25 PROCEDURE — P9040 RBC LEUKOREDUCED IRRADIATED: HCPCS

## 2022-07-25 PROCEDURE — 74011000250 HC RX REV CODE- 250: Performed by: INTERNAL MEDICINE

## 2022-07-25 PROCEDURE — 36430 TRANSFUSION BLD/BLD COMPNT: CPT

## 2022-07-25 PROCEDURE — 85027 COMPLETE CBC AUTOMATED: CPT

## 2022-07-25 PROCEDURE — 36415 COLL VENOUS BLD VENIPUNCTURE: CPT

## 2022-07-25 PROCEDURE — P9016 RBC LEUKOCYTES REDUCED: HCPCS

## 2022-07-25 PROCEDURE — 74011250637 HC RX REV CODE- 250/637: Performed by: INTERNAL MEDICINE

## 2022-07-25 PROCEDURE — 80048 BASIC METABOLIC PNL TOTAL CA: CPT

## 2022-07-25 PROCEDURE — 94760 N-INVAS EAR/PLS OXIMETRY 1: CPT

## 2022-07-25 PROCEDURE — 77010033678 HC OXYGEN DAILY

## 2022-07-25 PROCEDURE — 74011250637 HC RX REV CODE- 250/637: Performed by: NURSE PRACTITIONER

## 2022-07-25 PROCEDURE — 74011250636 HC RX REV CODE- 250/636: Performed by: INTERNAL MEDICINE

## 2022-07-25 PROCEDURE — 65270000046 HC RM TELEMETRY

## 2022-07-25 RX ORDER — POTASSIUM CHLORIDE 750 MG/1
20 TABLET, FILM COATED, EXTENDED RELEASE ORAL 2 TIMES DAILY
Status: DISCONTINUED | OUTPATIENT
Start: 2022-07-25 | End: 2022-07-29 | Stop reason: HOSPADM

## 2022-07-25 RX ORDER — SODIUM CHLORIDE 9 MG/ML
250 INJECTION, SOLUTION INTRAVENOUS AS NEEDED
Status: DISCONTINUED | OUTPATIENT
Start: 2022-07-25 | End: 2022-07-29 | Stop reason: HOSPADM

## 2022-07-25 RX ADMIN — FERROUS SULFATE TAB 325 MG (65 MG ELEMENTAL FE) 325 MG: 325 (65 FE) TAB at 10:32

## 2022-07-25 RX ADMIN — ATORVASTATIN CALCIUM 10 MG: 10 TABLET, FILM COATED ORAL at 22:37

## 2022-07-25 RX ADMIN — CARVEDILOL 3.12 MG: 3.12 TABLET, FILM COATED ORAL at 10:32

## 2022-07-25 RX ADMIN — FUROSEMIDE 40 MG: 10 INJECTION, SOLUTION INTRAMUSCULAR; INTRAVENOUS at 10:32

## 2022-07-25 RX ADMIN — PANTOPRAZOLE SODIUM 40 MG: 40 TABLET, DELAYED RELEASE ORAL at 17:51

## 2022-07-25 RX ADMIN — CITALOPRAM HYDROBROMIDE 20 MG: 20 TABLET ORAL at 17:51

## 2022-07-25 RX ADMIN — PANTOPRAZOLE SODIUM 40 MG: 40 TABLET, DELAYED RELEASE ORAL at 06:57

## 2022-07-25 RX ADMIN — SUCRALFATE 1 G: 1 TABLET ORAL at 13:15

## 2022-07-25 RX ADMIN — MAGNESIUM OXIDE TAB 400 MG (241.3 MG ELEMENTAL MG) 400 MG: 400 (241.3 MG) TAB at 10:32

## 2022-07-25 RX ADMIN — CARVEDILOL 3.12 MG: 3.12 TABLET, FILM COATED ORAL at 17:51

## 2022-07-25 RX ADMIN — SODIUM CHLORIDE, PRESERVATIVE FREE 10 ML: 5 INJECTION INTRAVENOUS at 17:50

## 2022-07-25 RX ADMIN — POTASSIUM CHLORIDE 20 MEQ: 750 TABLET, FILM COATED, EXTENDED RELEASE ORAL at 18:00

## 2022-07-25 RX ADMIN — SUCRALFATE 1 G: 1 TABLET ORAL at 22:37

## 2022-07-25 RX ADMIN — TRAZODONE HYDROCHLORIDE 25 MG: 50 TABLET ORAL at 22:37

## 2022-07-25 RX ADMIN — POTASSIUM CHLORIDE 20 MEQ: 750 TABLET, FILM COATED, EXTENDED RELEASE ORAL at 13:15

## 2022-07-25 RX ADMIN — SUCRALFATE 1 G: 1 TABLET ORAL at 06:57

## 2022-07-25 RX ADMIN — SODIUM CHLORIDE, PRESERVATIVE FREE 40 ML: 5 INJECTION INTRAVENOUS at 22:37

## 2022-07-25 RX ADMIN — SUCRALFATE 1 G: 1 TABLET ORAL at 17:50

## 2022-07-25 RX ADMIN — SODIUM CHLORIDE, PRESERVATIVE FREE 40 ML: 5 INJECTION INTRAVENOUS at 06:10

## 2022-07-25 NOTE — PROGRESS NOTES
GI PROGRESS NOTE   Lala Brand, ANGI  [phone] NP in-hospital cell phone M-F until 4:30  After 5pm or on weekends, please call  for physician on call    Lissy Fajardo :1940 WHD:[id number]   ATTG: Dr. Kassidy Lawrence  PCP: Delon Daly MD  Date/Time:  2022 12:41 PM   Primary GI: Dr. Maykel Qureshi  Reason for following: cirrhosis, GI bleed, ascites, thrombocytopenia    Assessment:     Acute on chronic anemia, drop in hemoglobin  Black stools and hemoccult +, however on PO iron  H/o chronic anemia, last work up  with hemorrhagic antral gastropathy noted s/p 1 hemostatic clip  Cirrhosis, decompensated, ascites, thrombocytopenia  No h/o esophageal varices, none seen on EGD 22  New ascites seen, s/p paracentesis with 2500 ml fluid drained- no SBP  INR 1.8 on 2022  Will order liver serologies pending   Plt 57  SAAG 1.6, total protein . 6, uncomplicated ascites in cirrhosis  MELD 24    CKD     GI Hx  -2022 EGD esophagitis at GE junction, severe diffuse erythematous antra gastropathy treated with APC over 75%, erosive duodenitis  - EGD normal esophagus, small HH, normal duodenum. Severe diffuse hemorrhagic antral gastropathy with contact bleeding. Hemostatic clip placed x1 for continued oozing bleeding from bx site with hemostasis achieved. Bx.  - M2A normal with gastritis  - CLN normal  - EGD with hemorrhagic gastritis per     Plan:     Patient with EGD 2022, with significant gastropathy, s/p APC treatment. Will need repeat Egd in 4-6 weeks for further treatment  Diet as tolerated   Avoid NSAIDs  PPI 40mg BID  Continue sucralfate  Liver serologies pending  Supportive measures per primary team. Monitor for S&S of GI bleed  Trend hemoglobin, goal >7, transfuse as clinically indicated. Daily CMP  Nothing further to add from a GI standpoint. GI signing-off. Please call with any questions. Thank you for this consult.    *Plan of care discussed with  Uriel      Subjective:   Patient resting in bed. Finished breakfast.  No new complaints. Review of Systems:  Symptom Y/N Comments  Symptom Y/N Comments   Fever/Chills    Chest Pain     Cough    Headaches     Sputum    Joint Pain     SOB/DAO    Pruritis/Rash     Tolerating Diet Y   Other       Could NOT obtain due to:      Objective:   VITALS:   Last 24hrs VS reviewed since prior progress note. Most recent are:  Visit Vitals  BP (!) 131/56   Pulse (!) 50   Temp 97.1 °F (36.2 °C)   Resp 17   SpO2 93%       Intake/Output Summary (Last 24 hours) at 7/25/2022 1052  Last data filed at 7/25/2022 0530  Gross per 24 hour   Intake 220 ml   Output 700 ml   Net -480 ml       PHYSICAL EXAM:  General: Pleasant chronically ill-appearing elderly female. HEENT: NC, Atraumatic. Anicteric sclerae. Lungs:  CTA Bilaterally. No Wheezing/Rhonchi/Rales. Heart:  Regular  rhythm,  No Rubs, No Gallops  Abdomen: Soft, Non distended, tender epigastric pain. +Bowel sounds, no HSM  Extremities: No c/c/e  Neurologic:  Alert and oriented to self. No acute neurological distress   Psych:   Not anxious nor agitated. Lab and Radiology Data Reviewed: (see below)    Medications Reviewed: (see below)  PMH/SH reviewed - no change compared to H&P  ________________________________________________________________________  Total time spent with patient: 20minutes ________________________________________________________________________  Care Plan discussed with:  Patient x   Family     RN x              Consultant:       Girma Page NP     Procedures: see electronic medical records for all procedures/Xrays and details which were not copied into this note but were reviewed prior to creation of Plan.       LABS:  Recent Labs     07/25/22  0405 07/24/22  0313   WBC 5.8 7.3   HGB 6.9* 7.6*   HCT 21.8* 23.5*   PLT 57* 63*       Recent Labs     07/25/22  0405 07/24/22  0313 07/23/22  1315    143 143   K 3.2* 3.5 3.6   * 111* 111*   CO2 28 28 25   BUN 67* 68* 67*   CREA 2.84* 2.91* 3.05*   * 169* 185*   CA 7.8* 7.8* 7.6*       No results for input(s): AP, TBIL, TP, ALB, GLOB, GGT, AML, LPSE in the last 72 hours. No lab exists for component: SGOT, GPT, AMYP, HLPSE    No results for input(s): INR, PTP, APTT, INREXT, INREXT in the last 72 hours. Recent Labs     07/22/22  1455   FERR 81        Lab Results   Component Value Date/Time    Folate 11.4 08/10/2013 04:00 AM     No results for input(s): PH, PCO2, PO2 in the last 72 hours. No results for input(s): CPK, CKMB in the last 72 hours.     No lab exists for component: TROPONINI  Lab Results   Component Value Date/Time    Color YELLOW/STRAW 05/20/2022 03:19 PM    Appearance CLEAR 05/20/2022 03:19 PM    Specific gravity 1.018 05/20/2022 03:19 PM    pH (UA) 5.0 05/20/2022 03:19 PM    Protein Negative 05/20/2022 03:19 PM    Glucose Negative 05/20/2022 03:19 PM    Ketone Negative 05/20/2022 03:19 PM    Bilirubin Negative 05/20/2022 03:19 PM    Urobilinogen 0.2 05/20/2022 03:19 PM    Nitrites Negative 05/20/2022 03:19 PM    Leukocyte Esterase Negative 05/20/2022 03:19 PM    Epithelial cells FEW 05/20/2022 03:19 PM    Bacteria Negative 05/20/2022 03:19 PM    WBC 0-4 05/20/2022 03:19 PM    RBC 0-5 05/20/2022 03:19 PM       MEDICATIONS:  Current Facility-Administered Medications   Medication Dose Route Frequency    0.9% sodium chloride infusion 250 mL  250 mL IntraVENous PRN    potassium chloride SR (KLOR-CON 10) tablet 20 mEq  20 mEq Oral BID    furosemide (LASIX) injection 40 mg  40 mg IntraVENous DAILY    pantoprazole (PROTONIX) tablet 40 mg  40 mg Oral ACB&D    sucralfate (CARAFATE) tablet 1 g - mixed in 10 ml of water to make slurry  1 g Oral AC&HS    fenofibrate nanocrystallized (TRICOR) tablet 145 mg  145 mg Oral QHS    citalopram (CELEXA) tablet 20 mg  20 mg Oral QPM    traZODone (DESYREL) tablet 25 mg  25 mg Oral QHS    carvediloL (COREG) tablet 3.125 mg  3.125 mg Oral BID WITH MEALS    atorvastatin (LIPITOR) tablet 10 mg  10 mg Oral QHS    ferrous sulfate tablet 325 mg  1 Tablet Oral DAILY WITH BREAKFAST    magnesium oxide (MAG-OX) tablet 400 mg  400 mg Oral DAILY    sodium chloride (NS) flush 5-40 mL  5-40 mL IntraVENous Q8H    acetaminophen (TYLENOL) tablet 650 mg  650 mg Oral Q6H PRN    Or    acetaminophen (TYLENOL) suppository 650 mg  650 mg Rectal Q6H PRN    polyethylene glycol (MIRALAX) packet 17 g  17 g Oral DAILY PRN    ondansetron (ZOFRAN ODT) tablet 4 mg  4 mg Oral Q8H PRN    Or    ondansetron (ZOFRAN) injection 4 mg  4 mg IntraVENous Q6H PRN

## 2022-07-25 NOTE — PROGRESS NOTES
Hospitalist Progress Note    NAME: Meme Valadez   :  1940   MRN:  [id number]       Assessment / Plan:    Acute Upper GI bleeding POA- Hb trended down 6.9 today AM  Acute Blood loss anemia new acute on chronic anemia POA- no new evidence of ongoing bleeding, likely was slow oozing  -IV Protonix drip started on admission, changed to IV Protonix twice daily- plan to change to PO soon if eating consistently today  CBC stat followed by daily  -Transfuse as indicated if hemoglobin less than 7 no active  - GI motility for the complaints. - Avoid NSAIDs  - Hemoglobin- 6.9 today AM  - Previous EGD in  normal Esophagus. Severe diffuse headache neuropathy with bleeding. S/p EGD - Esophagitis at [address], Severe Gastropathy s/p APC, Erosive duodenitis  Avoid NSAIDs, cont PPI BID, added carafate- cont for now  GI following- workup sent for Liver cirrhosis- serologies still pending  S/p CVC by anesthesia -blood/serology per GI sent- pending  Transfuse 1 unit PRBC as ordered    Acute kidney injury  -Generalized edema and anasarca  S/p gentle diuresis with lasix 20 mg daily-- cont increased dose 40 mg daily as Cr stable at 2.8 today, monitor BMP closely with IV diuresis  S/p albumin infusion x 2, last dose was yesterday  - Possible hepato- renal syndrome  Check BMP daily     liver cirrhosis with Acute on  chronic liver failure- ?etiology , 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 6, uncomplicated ascites in cirrhosis, MELD 24  Cont lasix for now- increased dose to 40 mg daily today  Workup pending for etiology?  Of cirrhosis  Ascitic Fluid Cx neg x 3 days now  [facility] Empiric IV Abx yesterday         Possible right lower lobe pneumonia POA  -Consolidative process seen at the base of the right lung  S/p IV Zosyn. DC today as cultures remain negative  Blood Cx (7/22) neg x 2 days     Other medical comorbidities  Hypertension  Hyperlipidemia  DM type II  History of CVA without residual deficit     DVT prophylaxis: SCD, chemical prophylaxis contraindicated        Estimated discharge date: July 27  Barriers: medical improvement ,    Code status: Full  Prophylaxis: SCD's  Recommended Disposition: NEW SNF as per daughter for short term then eventual home      Subjective:     Patient was seen and examined. No acute events overnight. Discussed with RN overnight events. All patient's questions were answered. \"Feeling better\"    Objective:     VITALS:   Last 24hrs VS reviewed since prior progress note. Most recent are:  Patient Vitals for the past 24 hrs:   Temp Pulse Resp BP SpO2   07/25/22 1458 97.5 °F (36.4 °C) (!) 49 18 (!) 154/64 98 %   07/25/22 1245 97.6 °F (36.4 °C) (!) 50 18 (!) 118/58 97 %   07/25/22 1215 (P) 97.7 °F (36.5 °C) (!) (P) 49 (P) 18 (P) 136/60 (P) 97 %   07/25/22 1152 97.4 °F (36.3 °C) (!) 49 18 (!) 111/56 90 %   07/25/22 1032 -- (!) 50 -- (!) 131/56 --   07/25/22 0829 97.1 °F (36.2 °C) (!) 49 -- (!) 96/52 93 %   07/25/22 0410 97.4 °F (36.3 °C) (!) 48 17 (!) 110/53 95 %   07/25/22 0122 -- (!) 47 17 -- --   07/24/22 2305 97.5 °F (36.4 °C) (!) 50 18 (!) 123/54 94 %   07/24/22 2025 97.8 °F (36.6 °C) (!) 53 17 (!) 100/56 97 %         Intake/Output Summary (Last 24 hours) at 7/25/2022 1615  Last data filed at 7/25/2022 1457  Gross per 24 hour   Intake 520 ml   Output 400 ml   Net 120 ml          I had a face to face encounter and independently examined this patient on 7/25/2022, as outlined below:  PHYSICAL EXAM:  General: WD, WN. Alert, cooperative, no acute distress    EENT:  EOMI. Anicteric sclerae. MMM  Resp:  CTA bilaterally, no wheezing or rales.   No accessory muscle use  CV:  Regular  rhythm,  No edema  GI:  Soft, Non distended, Non tender. +Bowel sounds  Neurologic:  EOMs intact. No facial asymmetry. No aphasia or slurred speech. Symmetrical strength, Sensation grossly intact. Alert and oriented X 4.     Psych:   Good insight. Not anxious nor agitated  Skin:  No rashes. No jaundice    Reviewed most current lab test results and cultures  YES  Reviewed most current radiology test results   YES  Review and summation of old records today    NO  Reviewed patient's current orders and MAR    YES  PMH/SH reviewed - no change compared to H&P  ________________________________________________________________________  Care Plan discussed with:    Comments   Patient x    Family  x Daughter and family at bedside yesterday   RN x    Care Manager x    Consultant                       x Multidiciplinary team rounds were held today with , nursing, pharmacist and clinical coordinator. Patient's plan of care was discussed; medications were reviewed and discharge planning was addressed. ________________________________________________________________________  Total Time: 26 mins      Comments   >50% of visit spent in counseling and coordination of care x    ________________________________________________________________________  Liz Sykes MD     Procedures: see electronic medical records for all procedures/Xrays and details which were not copied into this note but were reviewed prior to creation of Plan. LABS:  I reviewed today's most current labs and imaging studies.   Pertinent labs include:  Recent Labs     07/25/22  0405 07/24/22  0313 07/23/22  1315   WBC 5.8 7.3 6.7   HGB 6.9* 7.6* 7.5*   HCT 21.8* 23.5* 22.7*   PLT 57* 63* 65*       Recent Labs     07/25/22  0405 07/24/22  0313 07/23/22  1315    143 143   K 3.2* 3.5 3.6   * 111* 111*   CO2 28 28 25   * 169* 185*   BUN 67* 68* 67*   CREA 2.84* 2.91* 3.05*   CA 7.8* 7.8* 7.6*         Signed: Liz Sykes MD

## 2022-07-25 NOTE — PROGRESS NOTES
Bedside shift change report given to [address] (oncoming nurse) by Jaclyn Thacker RN (offgoing nurse). Report included the following information SBAR, Kardex, Intake/Output, MAR, and Cardiac Rhythm junctional rhythm .

## 2022-07-25 NOTE — PROGRESS NOTES
Problem: Falls - Risk of  Goal: *Absence of Falls  Description: Document Pro Garcia Fall Risk and appropriate interventions in the flowsheet. Outcome: Progressing Towards Goal  Note: Fall Risk Interventions:  Mobility Interventions: Bed/chair exit alarm, Patient to call before getting OOB    Mentation Interventions: Adequate sleep, hydration, pain control, Bed/chair exit alarm, Door open when patient unattended, Reorient patient    Medication Interventions: Bed/chair exit alarm, Teach patient to arise slowly    Elimination Interventions: Call light in reach, Bed/chair exit alarm, Patient to call for help with toileting needs    History of Falls Interventions: Bed/chair exit alarm, Door open when patient unattended         Problem: Patient Education: Go to Patient Education Activity  Goal: Patient/Family Education  Outcome: Progressing Towards Goal     Problem: Pressure Injury - Risk of  Goal: *Prevention of pressure injury  Description: Document Zachary Scale and appropriate interventions in the flowsheet. Outcome: Progressing Towards Goal  Note: Pressure Injury Interventions:  Sensory Interventions: Assess changes in LOC, Assess need for specialty bed, Check visual cues for pain, Float heels, Keep linens dry and wrinkle-free, Minimize linen layers    Moisture Interventions: Absorbent underpads, Apply protective barrier, creams and emollients, Assess need for specialty bed, Internal/External urinary devices, Minimize layers    Activity Interventions: Assess need for specialty bed    Mobility Interventions: Assess need for specialty bed, Float heels, Turn and reposition approx.  every two hours(pillow and wedges)    Nutrition Interventions: Document food/fluid/supplement intake, Offer support with meals,snacks and hydration    Friction and Shear Interventions: Apply protective barrier, creams and emollients, Foam dressings/transparent film/skin sealants, Lift team/patient mobility team, Minimize layers Problem: Patient Education: Go to Patient Education Activity  Goal: Patient/Family Education  Outcome: Progressing Towards Goal

## 2022-07-25 NOTE — PROGRESS NOTES
Spoke with pt's daughter Naz Le as per pt request, telephone consent for 1 unit prbc's received from pt's daughter and witnessed by [facility].

## 2022-07-26 LAB
A1AT SERPL-MCNC: 91 MG/DL (ref 101–187)
ABO + RH BLD: NORMAL
ANA SER QL: NEGATIVE
ANA SER QL: NEGATIVE
ANION GAP SERPL CALC-SCNC: 6 MMOL/L (ref 5–15)
BLD PROD TYP BPU: NORMAL
BLOOD GROUP ANTIBODIES SERPL: NORMAL
BPU ID: NORMAL
BUN SERPL-MCNC: 69 MG/DL (ref 6–20)
BUN/CREAT SERPL: 23 (ref 12–20)
CALCIUM SERPL-MCNC: 8.1 MG/DL (ref 8.5–10.1)
CERULOPLASMIN SERPL-MCNC: 20.4 MG/DL (ref 19–39)
CHLORIDE SERPL-SCNC: 112 MMOL/L (ref 97–108)
CO2 SERPL-SCNC: 28 MMOL/L (ref 21–32)
CREAT SERPL-MCNC: 2.94 MG/DL (ref 0.55–1.02)
CROSSMATCH RESULT,%XM: NORMAL
ERYTHROCYTE [DISTWIDTH] IN BLOOD BY AUTOMATED COUNT: 19 % (ref 11.5–14.5)
GLUCOSE SERPL-MCNC: 158 MG/DL (ref 65–100)
HCT VFR BLD AUTO: 25.5 % (ref 35–47)
HGB BLD-MCNC: 8.4 G/DL (ref 11.5–16)
MCH RBC QN AUTO: 31.6 PG (ref 26–34)
MCHC RBC AUTO-ENTMCNC: 32.9 G/DL (ref 30–36.5)
MCV RBC AUTO: 95.9 FL (ref 80–99)
NRBC # BLD: 0 K/UL (ref 0–0.01)
NRBC BLD-RTO: 0 PER 100 WBC
PLATELET # BLD AUTO: 68 K/UL (ref 150–400)
PMV BLD AUTO: 11.4 FL (ref 8.9–12.9)
POTASSIUM SERPL-SCNC: 3.5 MMOL/L (ref 3.5–5.1)
RBC # BLD AUTO: 2.66 M/UL (ref 3.8–5.2)
SODIUM SERPL-SCNC: 146 MMOL/L (ref 136–145)
SPECIMEN EXP DATE BLD: NORMAL
STATUS OF UNIT,%ST: NORMAL
UNIT DIVISION, %UDIV: 0
WBC # BLD AUTO: 6.4 K/UL (ref 3.6–11)

## 2022-07-26 PROCEDURE — 74011000250 HC RX REV CODE- 250: Performed by: INTERNAL MEDICINE

## 2022-07-26 PROCEDURE — 94760 N-INVAS EAR/PLS OXIMETRY 1: CPT

## 2022-07-26 PROCEDURE — 80048 BASIC METABOLIC PNL TOTAL CA: CPT

## 2022-07-26 PROCEDURE — 74011250637 HC RX REV CODE- 250/637: Performed by: NURSE PRACTITIONER

## 2022-07-26 PROCEDURE — 74011250636 HC RX REV CODE- 250/636: Performed by: INTERNAL MEDICINE

## 2022-07-26 PROCEDURE — 74011250637 HC RX REV CODE- 250/637: Performed by: INTERNAL MEDICINE

## 2022-07-26 PROCEDURE — 85027 COMPLETE CBC AUTOMATED: CPT

## 2022-07-26 PROCEDURE — 36415 COLL VENOUS BLD VENIPUNCTURE: CPT

## 2022-07-26 PROCEDURE — 65270000046 HC RM TELEMETRY

## 2022-07-26 PROCEDURE — 77010033678 HC OXYGEN DAILY

## 2022-07-26 RX ADMIN — SODIUM CHLORIDE, PRESERVATIVE FREE 10 ML: 5 INJECTION INTRAVENOUS at 15:37

## 2022-07-26 RX ADMIN — PANTOPRAZOLE SODIUM 40 MG: 40 TABLET, DELAYED RELEASE ORAL at 06:50

## 2022-07-26 RX ADMIN — CARVEDILOL 3.12 MG: 3.12 TABLET, FILM COATED ORAL at 17:47

## 2022-07-26 RX ADMIN — SUCRALFATE 1 G: 1 TABLET ORAL at 06:50

## 2022-07-26 RX ADMIN — SODIUM CHLORIDE, PRESERVATIVE FREE 40 ML: 5 INJECTION INTRAVENOUS at 05:26

## 2022-07-26 RX ADMIN — POTASSIUM CHLORIDE 20 MEQ: 750 TABLET, FILM COATED, EXTENDED RELEASE ORAL at 08:43

## 2022-07-26 RX ADMIN — SUCRALFATE 1 G: 1 TABLET ORAL at 22:19

## 2022-07-26 RX ADMIN — CITALOPRAM HYDROBROMIDE 20 MG: 20 TABLET ORAL at 17:47

## 2022-07-26 RX ADMIN — FERROUS SULFATE TAB 325 MG (65 MG ELEMENTAL FE) 325 MG: 325 (65 FE) TAB at 08:42

## 2022-07-26 RX ADMIN — SODIUM CHLORIDE, PRESERVATIVE FREE 10 ML: 5 INJECTION INTRAVENOUS at 22:20

## 2022-07-26 RX ADMIN — ATORVASTATIN CALCIUM 10 MG: 10 TABLET, FILM COATED ORAL at 22:19

## 2022-07-26 RX ADMIN — CARVEDILOL 3.12 MG: 3.12 TABLET, FILM COATED ORAL at 08:43

## 2022-07-26 RX ADMIN — MAGNESIUM OXIDE TAB 400 MG (241.3 MG ELEMENTAL MG) 400 MG: 400 (241.3 MG) TAB at 08:42

## 2022-07-26 RX ADMIN — SUCRALFATE 1 G: 1 TABLET ORAL at 12:48

## 2022-07-26 RX ADMIN — PANTOPRAZOLE SODIUM 40 MG: 40 TABLET, DELAYED RELEASE ORAL at 17:47

## 2022-07-26 RX ADMIN — TRAZODONE HYDROCHLORIDE 25 MG: 50 TABLET ORAL at 22:19

## 2022-07-26 RX ADMIN — POTASSIUM CHLORIDE 20 MEQ: 750 TABLET, FILM COATED, EXTENDED RELEASE ORAL at 17:47

## 2022-07-26 RX ADMIN — FUROSEMIDE 40 MG: 10 INJECTION, SOLUTION INTRAMUSCULAR; INTRAVENOUS at 08:42

## 2022-07-26 RX ADMIN — SUCRALFATE 1 G: 1 TABLET ORAL at 17:47

## 2022-07-26 NOTE — PROGRESS NOTES
Bedside shift change report given to [address] [place] [address] LPN (oncoming nurse) by Adonis Lewis RN (offgoing nurse). Report included the following information SBAR, Kardex, Intake/Output, MAR, and Cardiac Rhythm junctional rhythm .

## 2022-07-26 NOTE — PROGRESS NOTES
Problem: Falls - Risk of  Goal: *Absence of Falls  Description: Document Earma Castro Fall Risk and appropriate interventions in the flowsheet. Outcome: Progressing Towards Goal  Note: Fall Risk Interventions:  Mobility Interventions: Bed/chair exit alarm, Communicate number of staff needed for ambulation/transfer    Mentation Interventions: Adequate sleep, hydration, pain control, Bed/chair exit alarm, Door open when patient unattended, Reorient patient    Medication Interventions: Bed/chair exit alarm    Elimination Interventions: Call light in reach    History of Falls Interventions: Bed/chair exit alarm         Problem: Patient Education: Go to Patient Education Activity  Goal: Patient/Family Education  Outcome: Progressing Towards Goal     Problem: Pressure Injury - Risk of  Goal: *Prevention of pressure injury  Description: Document Zachary Scale and appropriate interventions in the flowsheet. Outcome: Progressing Towards Goal  Note: Pressure Injury Interventions:  Sensory Interventions: Assess changes in LOC, Assess need for specialty bed, Float heels, Keep linens dry and wrinkle-free, Minimize linen layers, Turn and reposition approx. every two hours (pillows and wedges if needed)    Moisture Interventions: Absorbent underpads, Apply protective barrier, creams and emollients, Assess need for specialty bed, Internal/External urinary devices, Minimize layers    Activity Interventions: PT/OT evaluation    Mobility Interventions: Float heels, HOB 30 degrees or less, Turn and reposition approx.  every two hours(pillow and wedges)    Nutrition Interventions: Document food/fluid/supplement intake, Offer support with meals,snacks and hydration    Friction and Shear Interventions: HOB 30 degrees or less, Lift team/patient mobility team, Minimize layers                Problem: Patient Education: Go to Patient Education Activity  Goal: Patient/Family Education  Outcome: Progressing Towards Goal

## 2022-07-26 NOTE — PROGRESS NOTES
Hospitalist Progress Note    NAME: Shawanda Pardo   :  1940   MRN:  [id number]       Assessment / Plan:  Acute Upper GI bleeding POA- Hb improved to 8.4 today AM  Acute Blood loss anemia new acute on chronic anemia POA- no new evidence of ongoing bleeding, likely was slow oozing  -IV Protonix drip started on admission, changed to IV Protonix twice daily- plan to change to PO soon if eating consistently today  CBC stat followed by daily  -Transfuse as indicated if hemoglobin less than 7 no active  - GI motility for the complaints. - Avoid NSAIDs  - Hemoglobin- 8.4 today AM  - Previous EGD in  normal Esophagus. Severe diffuse headache neuropathy with bleeding. S/p EGD - Esophagitis at [address], Severe Gastropathy s/p APC, Erosive duodenitis  Avoid NSAIDs, cont PPI BID, added carafate- cont for now  GI following- workup sent for Liver cirrhosis- serologies still pending  S/p CVC by anesthesia -blood/serology per GI sent- pending  S/p Transfuse 1 unit PRBC     Acute kidney injury  -Generalized edema and anasarca  S/p gentle diuresis with lasix 20 mg daily-- cont increased dose 40 mg daily as Cr stable at 2.9 today, monitor BMP closely with IV diuresis  S/p albumin infusion x 2, last dose was yesterday  - Possible hepato- renal syndrome  Check BMP daily     liver cirrhosis with Acute on  chronic liver failure- ?etiology , 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 6, uncomplicated ascites in cirrhosis, MELD 24  Cont lasix for now- increased dose to 40 mg daily today  Workup pending for etiology?  Of cirrhosis- OP GI followup  Ascitic Fluid Cx neg x 3 days now  [facility] Empiric IV Abx  now         Possible right lower lobe pneumonia POA  -Consolidative process seen at the base of the right lung  S/p IV Zosyn. DCd as cultures remain negative  Blood Cx (7/22) neg x 24days     Other medical comorbidities  Hypertension  Hyperlipidemia  DM type II  History of CVA without residual deficit     DVT prophylaxis: SCD, chemical prophylaxis contraindicated        Estimated discharge date: July 27? Barriers: SNF placement per family    Code status: Full  Prophylaxis: SCD's  Recommended Disposition: ? New SNF as per daughter for short term then eventual home      Subjective:     Patient was seen and examined. No acute events overnight. Discussed with RN overnight events. All patient's questions were answered. \"Feeling better\"    Objective:     VITALS:   Last 24hrs VS reviewed since prior progress note. Most recent are:  Patient Vitals for the past 24 hrs:   Temp Pulse Resp BP SpO2   07/26/22 1555 98.3 °F (36.8 °C) (!) 53 16 (!) 109/44 97 %   07/26/22 1216 97.9 °F (36.6 °C) (!) 53 18 (!) 128/52 97 %   07/26/22 0842 96.9 °F (36.1 °C) (!) 55 20 (!) 121/54 96 %   07/26/22 0523 -- -- 20 -- 93 %   07/26/22 0522 -- (!) 50 20 -- (!) 86 %   07/26/22 0310 97.4 °F (36.3 °C) (!) 52 20 (!) 134/57 97 %   07/26/22 0005 -- (!) 50 17 -- --   07/25/22 2320 (!) 96.4 °F (35.8 °C) (!) 53 18 (!) 124/57 95 %   07/25/22 2016 (!) 96.6 °F (35.9 °C) (!) 50 18 (!) 142/65 98 %   07/25/22 1745 -- -- -- -- 95 %   07/25/22 1735 -- -- -- -- (!) 86 %   07/25/22 1733 97.4 °F (36.3 °C) (!) 52 -- (!) 115/56 --         Intake/Output Summary (Last 24 hours) at 7/26/2022 1637  Last data filed at 7/26/2022 0522  Gross per 24 hour   Intake 100 ml   Output 1200 ml   Net -1100 ml          I had a face to face encounter and independently examined this patient on 7/26/2022, as outlined below:  PHYSICAL EXAM:  General: WD, WN. Alert, cooperative, no acute distress    EENT:  EOMI. Anicteric sclerae. MMM  Resp:  CTA bilaterally, no wheezing or rales.   No accessory muscle use  CV:  Regular rhythm,  No edema  GI:  Soft, Non distended, Non tender. +Bowel sounds  Neurologic:  EOMs intact. No facial asymmetry. No aphasia or slurred speech. Symmetrical strength, Sensation grossly intact. Alert and oriented X 4.     Psych:   Good insight. Not anxious nor agitated  Skin:  No rashes. No jaundice    Reviewed most current lab test results and cultures  YES  Reviewed most current radiology test results   YES  Review and summation of old records today    NO  Reviewed patient's current orders and MAR    YES  PMH/SH reviewed - no change compared to H&P  ________________________________________________________________________  Care Plan discussed with:    Comments   Patient x    Family  x Daughter and family at bedside Sunday   RN x    Care Manager x    Consultant                       x Multidiciplinary team rounds were held today with , nursing, pharmacist and clinical coordinator. Patient's plan of care was discussed; medications were reviewed and discharge planning was addressed. ________________________________________________________________________  Total Time: 26 mins      Comments   >50% of visit spent in counseling and coordination of care x    ________________________________________________________________________  Aida Hauser MD     Procedures: see electronic medical records for all procedures/Xrays and details which were not copied into this note but were reviewed prior to creation of Plan. LABS:  I reviewed today's most current labs and imaging studies.   Pertinent labs include:  Recent Labs     07/26/22  0508 07/25/22  0405 07/24/22  0313   WBC 6.4 5.8 7.3   HGB 8.4* 6.9* 7.6*   HCT 25.5* 21.8* 23.5*   PLT 68* 57* 63*       Recent Labs     07/26/22  0508 07/25/22  0405 07/24/22  0313   * 145 143   K 3.5 3.2* 3.5   * 112* 111*   CO2 28 28 28   * 145* 169*   BUN 69* 67* 68*   CREA 2.94* 2.84* 2.91*   CA 8.1* 7.8* 7.8*         Signed: Aida Hauser, MD

## 2022-07-26 NOTE — PROGRESS NOTES
Spiritual Care Assessment/Progress Note  [facility]      NAME: Allyssa Cueto      MRN: [id number]  AGE: 78 y.o. SEX: female  Amish Affiliation: Druze   Language: English     7/26/2022     Total Time (in minutes): 13     Spiritual Assessment begun in [facility] 2 MED TELE through conversation with:         []Patient        [] Family    [] Friend(s)        Reason for Consult: Initial/Spiritual assessment, patient floor     Spiritual beliefs: (Please include comment if needed)     [] Identifies with a esme tradition:         [] Supported by a esme community:            [] Claims no spiritual orientation:           [] Seeking spiritual identity:                [] Adheres to an individual form of spirituality:           [x] Not able to assess:                           Identified resources for coping:      [] Prayer                               [] Music                  [] Guided Imagery     [] Family/friends                 [] Pet visits     [] Devotional reading                         [x] Unknown     [] Other:                                                Interventions offered during this visit: (See comments for more details)    Patient Interventions: Initial visit, Other (comment) (Left pastoral care note)           Plan of Care:     [x] Support spiritual and/or cultural needs    [] Support AMD and/or advance care planning process      [] Support grieving process   [] Coordinate Rites and/or Rituals    [] Coordination with community clergy   [] No spiritual needs identified at this time   [] Detailed Plan of Care below (See Comments)  [] Make referral to Music Therapy  [] Make referral to Pet Therapy     [] Make referral to Addiction services  [] Make referral to [facility]  [] Make referral to Spiritual Care Partner  [] No future visits requested        [x] Contact Spiritual Care for further referrals     Comments:  Reviewed chart prior to visit on Med Tele for spiritual assessment.   No family/friends present. Patient appeared to be sleeping soundly and did not stir when  knocked and called her name. Unable to assess for spiritual needs or concerns at this time. Left pastoral care note advising her of  availability.    available upon referral by staff or by patient/family request.       Joni Flores, MPS, [address] [facility], Staff Chaplain [place] [facility] Paging Service  [phone])

## 2022-07-27 LAB
ANION GAP SERPL CALC-SCNC: 6 MMOL/L (ref 5–15)
BUN SERPL-MCNC: 68 MG/DL (ref 6–20)
BUN/CREAT SERPL: 23 (ref 12–20)
CALCIUM SERPL-MCNC: 8 MG/DL (ref 8.5–10.1)
CHLORIDE SERPL-SCNC: 110 MMOL/L (ref 97–108)
CO2 SERPL-SCNC: 27 MMOL/L (ref 21–32)
CREAT SERPL-MCNC: 2.93 MG/DL (ref 0.55–1.02)
ERYTHROCYTE [DISTWIDTH] IN BLOOD BY AUTOMATED COUNT: 18.6 % (ref 11.5–14.5)
GLUCOSE SERPL-MCNC: 140 MG/DL (ref 65–100)
HCT VFR BLD AUTO: 25.3 % (ref 35–47)
HGB BLD-MCNC: 8.3 G/DL (ref 11.5–16)
MCH RBC QN AUTO: 31.1 PG (ref 26–34)
MCHC RBC AUTO-ENTMCNC: 32.8 G/DL (ref 30–36.5)
MCV RBC AUTO: 94.8 FL (ref 80–99)
MITOCHONDRIA M2 IGG SER-ACNC: <20 UNITS (ref 0–20)
NRBC # BLD: 0 K/UL (ref 0–0.01)
NRBC BLD-RTO: 0 PER 100 WBC
PLATELET # BLD AUTO: 70 K/UL (ref 150–400)
PMV BLD AUTO: 10.7 FL (ref 8.9–12.9)
POTASSIUM SERPL-SCNC: 3.7 MMOL/L (ref 3.5–5.1)
RBC # BLD AUTO: 2.67 M/UL (ref 3.8–5.2)
SMA IGG SER-ACNC: 17 UNITS (ref 0–19)
SODIUM SERPL-SCNC: 143 MMOL/L (ref 136–145)
WBC # BLD AUTO: 6.2 K/UL (ref 3.6–11)

## 2022-07-27 PROCEDURE — 80048 BASIC METABOLIC PNL TOTAL CA: CPT

## 2022-07-27 PROCEDURE — 85027 COMPLETE CBC AUTOMATED: CPT

## 2022-07-27 PROCEDURE — 74011000250 HC RX REV CODE- 250: Performed by: INTERNAL MEDICINE

## 2022-07-27 PROCEDURE — 65270000046 HC RM TELEMETRY

## 2022-07-27 PROCEDURE — 94760 N-INVAS EAR/PLS OXIMETRY 1: CPT

## 2022-07-27 PROCEDURE — 97530 THERAPEUTIC ACTIVITIES: CPT

## 2022-07-27 PROCEDURE — 74011250636 HC RX REV CODE- 250/636: Performed by: INTERNAL MEDICINE

## 2022-07-27 PROCEDURE — 77010033678 HC OXYGEN DAILY

## 2022-07-27 PROCEDURE — 74011250637 HC RX REV CODE- 250/637: Performed by: INTERNAL MEDICINE

## 2022-07-27 PROCEDURE — 97161 PT EVAL LOW COMPLEX 20 MIN: CPT

## 2022-07-27 PROCEDURE — 74011250637 HC RX REV CODE- 250/637: Performed by: NURSE PRACTITIONER

## 2022-07-27 PROCEDURE — 36415 COLL VENOUS BLD VENIPUNCTURE: CPT

## 2022-07-27 PROCEDURE — 97530 THERAPEUTIC ACTIVITIES: CPT | Performed by: OCCUPATIONAL THERAPIST

## 2022-07-27 PROCEDURE — 97166 OT EVAL MOD COMPLEX 45 MIN: CPT | Performed by: OCCUPATIONAL THERAPIST

## 2022-07-27 RX ADMIN — CITALOPRAM HYDROBROMIDE 20 MG: 20 TABLET ORAL at 19:17

## 2022-07-27 RX ADMIN — ATORVASTATIN CALCIUM 10 MG: 10 TABLET, FILM COATED ORAL at 23:16

## 2022-07-27 RX ADMIN — PANTOPRAZOLE SODIUM 40 MG: 40 TABLET, DELAYED RELEASE ORAL at 09:12

## 2022-07-27 RX ADMIN — SUCRALFATE 1 G: 1 TABLET ORAL at 12:55

## 2022-07-27 RX ADMIN — SODIUM CHLORIDE, PRESERVATIVE FREE 10 ML: 5 INJECTION INTRAVENOUS at 22:18

## 2022-07-27 RX ADMIN — TRAZODONE HYDROCHLORIDE 25 MG: 50 TABLET ORAL at 23:16

## 2022-07-27 RX ADMIN — CARVEDILOL 3.12 MG: 3.12 TABLET, FILM COATED ORAL at 09:12

## 2022-07-27 RX ADMIN — POTASSIUM CHLORIDE 20 MEQ: 750 TABLET, FILM COATED, EXTENDED RELEASE ORAL at 19:17

## 2022-07-27 RX ADMIN — FUROSEMIDE 40 MG: 10 INJECTION, SOLUTION INTRAMUSCULAR; INTRAVENOUS at 09:12

## 2022-07-27 RX ADMIN — FERROUS SULFATE TAB 325 MG (65 MG ELEMENTAL FE) 325 MG: 325 (65 FE) TAB at 09:12

## 2022-07-27 RX ADMIN — SUCRALFATE 1 G: 1 TABLET ORAL at 16:30

## 2022-07-27 RX ADMIN — SODIUM CHLORIDE, PRESERVATIVE FREE 10 ML: 5 INJECTION INTRAVENOUS at 06:32

## 2022-07-27 RX ADMIN — SUCRALFATE 1 G: 1 TABLET ORAL at 23:16

## 2022-07-27 RX ADMIN — SUCRALFATE 1 G: 1 TABLET ORAL at 09:12

## 2022-07-27 RX ADMIN — MAGNESIUM OXIDE TAB 400 MG (241.3 MG ELEMENTAL MG) 400 MG: 400 (241.3 MG) TAB at 09:12

## 2022-07-27 RX ADMIN — PANTOPRAZOLE SODIUM 40 MG: 40 TABLET, DELAYED RELEASE ORAL at 16:05

## 2022-07-27 RX ADMIN — SODIUM CHLORIDE, PRESERVATIVE FREE 10 ML: 5 INJECTION INTRAVENOUS at 16:01

## 2022-07-27 RX ADMIN — POTASSIUM CHLORIDE 20 MEQ: 750 TABLET, FILM COATED, EXTENDED RELEASE ORAL at 09:12

## 2022-07-27 RX ADMIN — CARVEDILOL 3.12 MG: 3.12 TABLET, FILM COATED ORAL at 16:05

## 2022-07-27 NOTE — PROGRESS NOTES
Problem: Self Care Deficits Care Plan (Adult)  Goal: *Acute Goals and Plan of Care (Insert Text)  Description: FUNCTIONAL STATUS PRIOR TO ADMISSION: was at [facility] rehab for two months and was getting ready to be discharged to home, prior to first hospital admit in May 2022 pt was ambulating with RW and fell resulting in RUE humeral fxr, needed max assist for ADLS after fxr    HOME SUPPORT PRIOR TO ADMISSION: was at [facility] rehab, prior to this pt was living with her daughter    Occupational Therapy Goals:  Initiated 7/27/2022  1. Patient will perform self-feeding with supervision/set-up within 7 days. 2. Patient will perform grooming with minimal assistance/contact guard assist within 7 days. 3. Patient will perform upper body dressing with moderate assistance  within 7 days. 4. Patient will improve static sitting balance to supervision in prep for functional transfers 7 days. Outcome: Not Met   OCCUPATIONAL THERAPY EVALUATION  Patient: Andrew Gore (72 y.o. female)  Date: 7/27/2022  Primary Diagnosis: GI bleed [K92.2]  Severe anemia [D64.9]  Procedure(s) (LRB):  SPECIAL PROCEDURE OUTSIDE OF OR (N/A) 4 Days Post-Op   Precautions:  Fall, Bed Alarm (NWB RUE?)    ASSESSMENT  Based on the objective data described below, the patient presents with generalized pain especially to touch. BLE are very swollen and BUE were weeping L>R. Pt was unable to provide PLOF and this was obtained from chart review and some information obtained by pts sister in law who arrived during session. Pt was at SNF for the past two months and was getting ready to be discharged to home? Her family member reports that a hollis lift and wheelchair had been ordered. Pt had a history of RUE humeral fracture in 5/21/22. Pt didn't have a UE sling and she was unable to state if she has any restrictions for RUE. I assume that pt is NWB on RUE. Max assist x2 needed for supine to sit and pt needed constant support to maintain upright.   She was very orthostatic (pt unable to state if she was dizzy but was pale in appearance). BP improved once pt was assisted to supine. O2 sat was 91-90% on 2L NC and HR was 47-52 except breifly with drop in BP. She was incontinent of stool and bladder and was unaware this session. Total assist needed for clean up at bed level. Overall pt is performing ADLS at a min to total assist level. Current Level of Function Impacting Discharge (ADLs/self-care): max assist x2 be mobility    Feeding: Minimum assistance    Oral Facial Hygiene/Grooming: Maximum assistance    Bathing: Total assistance    Upper Body Dressing: Total assistance    Lower Body Dressing: Total assistance    Toileting: Total assistance    Functional Outcome Measure: The patient scored 5/100 on the barthel outcome measure which is indicative of significant decline in mobility and ADLS. Other factors to consider for discharge: was at [facility] rehab     Patient will benefit from skilled therapy intervention to address the above noted impairments. PLAN :  Recommendations and Planned Interventions: self care training, functional mobility training, therapeutic exercise, balance training, therapeutic activities, patient education, home safety training, and family training/education    Frequency/Duration: Patient will be followed by occupational therapy 3 times a week to address goals. Recommendation for discharge: (in order for the patient to meet his/her long term goals)  Therapy up to 5 days/week in SNF setting or if pt returns to home pt will need near total care and home health    This discharge recommendation:  Has not yet been discussed the attending provider and/or case management    IF patient discharges home will need the following DME: hospital bed, O2, pulse ox, bed pan, wheelchair, hollis lift       SUBJECTIVE:   Patient stated Oh! [place]    OBJECTIVE DATA SUMMARY:   HISTORY:   Past Medical History:   Diagnosis Date    Chronic pain     hip, back    Depression     Diabetes ([facility] [address] [address]     GERD (gastroesophageal reflux disease)     Hemorrhagic gastritis     10/2013    [place] (hard of hearing)     no hearing aides    Hyperlipemia     Hypertension     Stroke (cerebrum) ([address]     no residual    Stroke ([facility] [address] [facility]     No residual says patient     Past Surgical History:   Procedure Laterality Date    HX  SECTION  1979    HX CHOLECYSTECTOMY      IR KYPHOPLASTY LUMBAR  10/19/2020    IR KYPHOPLASTY LUMBAR  10/19/2020    IN COLONOSCOPY FLX DX W/COLLJ SPEC WHEN PFRMD  2013         IN ESOPHAGOGASTRODUODENOSCOPY TRANSORAL DIAGNOSTIC  10/10/2013            Expanded or extensive additional review of patient history:     Home Situation  Home Environment: Skilled nursing facility  Support Systems: Child(diane)  Patient Expects to be Discharged to[de-identified] Skilled nursing facility  Current DME Used/Available at Home:  (hollis lift at home)    Hand dominance: Right    EXAMINATION OF PERFORMANCE DEFICITS:  Cognitive/Behavioral Status:  Neurologic State: Alert  Orientation Level: Oriented to person;Disoriented to time;Disoriented to situation;Oriented to place  Cognition: Decreased attention/concentration;Decreased command following  Perception: Cues to maintain midline in sitting  Perseveration: No perseveration noted  Safety/Judgement: Fall prevention    Edema: severe BLE      Hearing: Auditory  Auditory Impairment: Hard of hearing, bilateral    Vision/Perceptual:                                Corrective Lenses:  (appears grossly intact)    Range of Motion:    AROM: Generally decreased, functional  PROM: Generally decreased, functional                      Strength:    Strength: Generally decreased, functional                Coordination:  Coordination: Generally decreased, functional  Fine Motor Skills-Upper: Left Intact; Right Intact    Gross Motor Skills-Upper: Left Impaired (RUE not formally assesses)    Tone & Sensation:    Tone: Normal  Sensation: Intact Balance:  Sitting: Impaired; Without support  Sitting - Static: Good (unsupported)  Sitting - Dynamic: Not tested    Functional Mobility and Transfers for ADLs:  Bed Mobility:  Supine to Sit: Maximum assistance;Assist x2;Bed Modified  Sit to Supine: Maximum assistance;Assist x2;Bed Modified    Transfers:  Sit to Stand:  (unsafe to attempt)  Bed to Chair:  (unable needs hollis at this time)  Toilet Transfer :  (unable needed bedpan)    ADL Assessment:  Feeding: Minimum assistance    Oral Facial Hygiene/Grooming: Maximum assistance    Bathing: Total assistance    Upper Body Dressing: Total assistance    Lower Body Dressing: Total assistance    Toileting: Total assistance                  ADL Intervention and task modifications: Total assist bed level toileting     Functional Measure:    Barthel Index:  Bathin  Bladder: 0  Bowels: 0  Groomin  Dressin  Feedin  Mobility: 0  Stairs: 0  Toilet Use: 0  Transfer (Bed to Chair and Back): 0  Total: 5/100      The Barthel ADL Index: Guidelines  1. The index should be used as a record of what a patient does, not as a record of what a patient could do. 2. The main aim is to establish degree of independence from any help, physical or verbal, however minor and for whatever reason. 3. The need for supervision renders the patient not independent. 4. A patient's performance should be established using the best available evidence. Asking the patient, friends/relatives and nurses are the usual sources, but direct observation and common sense are also important. However direct testing is not needed. 5. Usually the patient's performance over the preceding 24-48 hours is important, but occasionally longer periods will be relevant. 6. Middle categories imply that the patient supplies over 50 per cent of the effort. 7. Use of aids to be independent is allowed.     Score Interpretation (from [address] [place] [address])    Independent   60-79 Minimally independent   40-59 Partially dependent   20-39 Very dependent   <20 Totally dependent     -Carlos Freitas, Barthel, D.W. (2551). Functional evaluation: the Barthel Index. [address] ([address] (1997). The Barthel activities of daily living index: self-reporting versus actual performance in the old (> or = 75 years). Journal of [address] [place] [address] 45(7), [facility], PATRICK, Jennifer Momin., Brent Mart. (1999). Measuring the change in disability after inpatient rehabilitation; comparison of the responsiveness of the Barthel Index and Functional Gardner Measure. Journal of Neurology, Neurosurgery, and Psychiatry, 66(4), 974-295. BOBO Maguire, ESTELLE Cool, & Juli Yeager MALYSSIA. (2004) Assessment of post-stroke quality of life in cost-effectiveness studies: The usefulness of the Barthel Index and the EuroQoL-5D. Quality of Life Research, 15, 146-76         Occupational Therapy Evaluation Charge Determination   History Examination Decision-Making   MEDIUM Complexity : Expanded review of history including physical, cognitive and psychosocial  history  MEDIUM Complexity : 3-5 performance deficits relating to physical, cognitive , or psychosocial skils that result in activity limitations and / or participation restrictions HIGH Complexity : Patient presents with comorbidities that affect occupational performance.  Signifigant modification of tasks or assistance (eg, physical or verbal) with assessment (s) is necessary to enable patient to complete evaluation       Based on the above components, the patient evaluation is determined to be of the following complexity level: MEDIUM  Pain Rating:  Generalized pain      Activity Tolerance:   Poor and observed SOB with activity    After treatment patient left in no apparent distress:    Supine in bed, Heels elevated for pressure relief, Call bell within reach, Bed / chair alarm activated, Caregiver / family present, and Side rails x 3    COMMUNICATION/EDUCATION:   The patients plan of care was discussed with: Physical therapist and Registered nurse. Patient is unable to participate in goal setting and plan of care. This patients plan of care is appropriate for delegation to VALERIY.     Thank you for this referral.  Joyce Zamora, OTR/L  Time Calculation: 25 mins

## 2022-07-27 NOTE — PROGRESS NOTES
Hospitalist Progress Note    NAME: Ninfa Garcia   :  1940   MRN:  [id number]       Assessment / Plan:  Acute UGI bleeding POA-  cont PPI  Acute on chronic anemia -cont monitor hgb >8  GI motility for the complaints. Avoid NSAIDs  S/p EGD - Esophagitis at 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 6, uncomplicated ascites in cirrhosis, MELD 24  Cont lasix   40 mg daily    Workup pending for etiology? Of cirrhosis- OP GI followup  Ascitic Fluid Cx neg    DCd Empiric IV Abx         Possible right lower lobe pneumonia POA  -Consolidative process seen at the base of the right lung  S/p IV Zosyn. DCd as cultures remain negative  Blood Cx () neg     Other medical comorbidities  Hypertension  Hyperlipidemia  DM type II  History of CVA without residual deficit     DVT prophylaxis: SCD, chemical prophylaxis contraindicated  Barriers: SNF placement per family    Code status: Full  Prophylaxis: SCD's  Recommended Disposition New SNF as per daughter      Subjective:     Patient was seen and examined. Chart reviewed. No acute events per staff. Bradycardic but asymptomatic. Hard of hearing, offers no complaints  D/w family at bedside. Objective:     VITALS:   Last 24hrs VS reviewed since prior progress note.  Most recent are:  Patient Vitals for the past 24 hrs:   Temp Pulse Resp BP SpO2   22 1117 -- (!) 50 -- (!) 134/53 91 %   22 1116 -- 80 -- (!) 72/51 --   22 1109 -- (!) 48 -- (!) 108/58 91 %   07/27/22 0901 97.5 °F (36.4 °C) (!) 50 -- (!) 103/47 100 %   07/26/22 1555 98.3 °F (36.8 °C) (!) 53 16 (!) 109/44 97 %       No intake or output data in the 24 hours ending 07/27/22 1254       I had a face to face encounter and independently examined this patient on 7/27/2022, as outlined below:  PHYSICAL EXAM:  General: WDWN. no acute distress    EENT:  MMM  Resp:  No accessory muscle use  CV:  RRR,  No edema  GI:  Soft, Non distended,   Neurologic:  No aphasia or slurred speech. Psych:    Not anxious nor agitated  Skin:  No rashes.   No jaundice        LABS:  Pertinent labs include:  Recent Labs     07/27/22  0452 07/26/22  0508 07/25/22  0405   WBC 6.2 6.4 5.8   HGB 8.3* 8.4* 6.9*   HCT 25.3* 25.5* 21.8*   PLT 70* 68* 57*       Recent Labs     07/27/22  0452 07/26/22  0508 07/25/22  0405    146* 145   K 3.7 3.5 3.2*   * 112* 112*   CO2 27 28 28   * 158* 145*   BUN 68* 69* 67*   CREA 2.93* 2.94* 2.84*   CA 8.0* 8.1* 7.8*         Current Facility-Administered Medications:     0.9% sodium chloride infusion 250 mL, 250 mL, IntraVENous, PRN, Tejal Diaz MD    potassium chloride SR (KLOR-CON 10) tablet 20 mEq, 20 mEq, Oral, BID, Tanvi Wei MD, 20 mEq at 07/27/22 0912    furosemide (LASIX) injection 40 mg, 40 mg, IntraVENous, DAILY, Tanvi Wiseman MD, 40 mg at 07/27/22 0912    pantoprazole (PROTONIX) tablet 40 mg, 40 mg, Oral, ACB&D, Tanvi Wei MD, 40 mg at 07/27/22 0912    sucralfate (CARAFATE) tablet 1 g - mixed in 10 ml of water to make slurry, 1 g, Oral, AC&HS, Budd Koyanagi, NP, 1 g at 07/27/22 1255    citalopram (CELEXA) tablet 20 mg, 20 mg, Oral, QPM, Raciel Alcantara MD, 20 mg at 07/26/22 1747    traZODone (DESYREL) tablet 25 mg, 25 mg, Oral, QHS, Raciel Alcantara MD, 25 mg at 07/26/22 2219    carvediloL (COREG) tablet 3.125 mg, 3.125 mg, Oral, BID WITH MEALS, Raciel Alcantara MD, 3.125 mg at 07/27/22 0912    atorvastatin (LIPITOR) tablet 10 mg, 10 mg, Oral, QHS, Raciel Alcantara MD, 10 mg at 07/26/22 2219    ferrous sulfate tablet 325 mg, 1 Tablet, Oral, DAILY WITH BREAKFAST, Raciel Alcantara MD, 325 mg at 07/27/22 0912    magnesium oxide (MAG-OX) tablet 400 mg, 400 mg, Oral, DAILY, Raciel Alcantara MD, 400 mg at 07/27/22 0912    sodium chloride (NS) flush 5-40 mL, 5-40 mL, IntraVENous, Q8H, Raciel Alcantara MD, 10 mL at 07/27/22 3119    acetaminophen (TYLENOL) tablet 650 mg, 650 mg, Oral, Q6H PRN, 650 mg at 07/22/22 5994 **OR** acetaminophen (TYLENOL) suppository 650 mg, 650 mg, Rectal, Q6H PRN, Raciel Alcantara MD    polyethylene glycol (MIRALAX) packet 17 g, 17 g, Oral, DAILY PRN, Raciel Alcantara MD    ondansetron (ZOFRAN ODT) tablet 4 mg, 4 mg, Oral, Q8H PRN **OR** ondansetron (ZOFRAN) injection 4 mg, 4 mg, IntraVENous, Q6H PRN, Raciel Alcantara MD, 4 mg at 07/24/22 1700     Signed: Jaylen Cabrera MD

## 2022-07-27 NOTE — PROGRESS NOTES
Transition of Care Plan:     RUR: 17% (moderate)  Disposition:Anticipate SNF. She has been at [facility] for rehab but daughter would like a different SNF at discharge. CM emailed her a list of SNF and she will let us know her choices. Follow up appointments:TBD   DME needed:She has a walker at home , [address] and wheelchair. Family in process of building a ramp. Transportation at Discharge:BLS anticipated to SNF for rehab. [place] or means to access home:   daughter     IM Medicare Letter: Will need second IM letter at discharge. Is patient a BCPI-A Bundle:    N/A                  If yes, was Bundle Letter given?:    Is patient a  and connected with the [place]? NO     Caregiver Contact: Lucretia Sheth daughter   Discharge Caregiver contacted prior to discharge? Care Conference needed?:  daughter        PT/OT recommended SNF, CM unable to get in contact with family this afternoon, to try again in the morning.       BUBBA ColemanN, RN    Care Management  [phone]

## 2022-07-27 NOTE — PROGRESS NOTES
Problem: Mobility Impaired (Adult and Pediatric)  Goal: *Acute Goals and Plan of Care (Insert Text)  Description: FUNCTIONAL STATUS PRIOR TO ADMISSION: The patient came from skilled rehab. It is unclear what she has been able to do there. HOME SUPPORT PRIOR TO ADMISSION: The patient lived with family who provide assistance . Physical Therapy Goals  Initiated 7/27/2022  1. Patient will move from supine to sit and sit to supine  in bed with moderate assistance  within 7 day(s). 2.  Patient will transfer from bed to chair and chair to bed with maximal assistance  using the least restrictive device within 7 day(s). 3.  Patient will perform sit to stand with maximal assistance within 7 day(s). 4.  Patient will ambulate with maximal assistance for 15 feet with the least restrictive device within 7 day(s). Outcome: Not Progressing Towards Goal   PHYSICAL THERAPY EVALUATION  Patient: Emperatriz Holcomb (20 y.o. female)  Date: 7/27/2022  Primary Diagnosis: GI bleed [K92.2]  Severe anemia [D64.9]  Procedure(s) (LRB):  SPECIAL PROCEDURE OUTSIDE OF OR (N/A) 4 Days Post-Op   Precautions:   Fall, Bed Alarm    ASSESSMENT  Based on the objective data described below, the patient presents with decreased independence with functional mobility and decreased strength/endurance S/P admission from SNF rehab with GI bleed and anemia. She is received supine in bed. Bilateral UE/LE are swollen and painful to touch. Patient demonstrates L shoulder ROM> R. She is assessed and positive for orthostatic hypotension. Patient is provided Max A x2 for supine to sit. She demonstrates good sitting balance. Patient is returned to bed due to BP value. She denies feeling dizzy and light headed. Current Level of Function Impacting Discharge (mobility/balance): Max A x2 supine<>sit     Functional Outcome Measure: The patient scored 10/100 on the Barthel outcome measure.       Other factors to consider for discharge: medical stability, decreased strength endurance, decreased independence      Patient will benefit from skilled therapy intervention to address the above noted impairments. PLAN :  Recommendations and Planned Interventions: bed mobility training, transfer training, gait training, therapeutic exercises, neuromuscular re-education, edema management/control, patient and family training/education, and therapeutic activities      Frequency/Duration: Patient will be followed by physical therapy:  4 times a week to address goals. Recommendation for discharge: (in order for the patient to meet his/her long term goals)  Therapy up to 5 days/week in SNF setting    This discharge recommendation:  Has been made in collaboration with the attending provider and/or case management    IF patient discharges home will need the following DME: to be determined (TBD)         SUBJECTIVE:   Patient stated I don't know.     OBJECTIVE DATA SUMMARY:   HISTORY:    Past Medical History:   Diagnosis Date    Chronic pain     hip, back    Depression     Diabetes ([address]     GERD (gastroesophageal reflux disease)     Hemorrhagic gastritis     10/2013    [place] (hard of hearing)     no hearing aides    Hyperlipemia     Hypertension     Stroke (cerebrum) ([address]     no residual    Stroke ([address]     No residual says patient     Past Surgical History:   Procedure Laterality Date    HX  SECTION      HX CHOLECYSTECTOMY      IR KYPHOPLASTY LUMBAR  10/19/2020    IR KYPHOPLASTY LUMBAR  10/19/2020    CO COLONOSCOPY FLX DX W/COLLJ SPEC WHEN PFRMD  2013         CO ESOPHAGOGASTRODUODENOSCOPY TRANSORAL DIAGNOSTIC  10/10/2013            Personal factors and/or comorbidities impacting plan of care: please see above    Home Situation  Home Environment: Skilled nursing facility  Support Systems: Child(diane)  Patient Expects to be Discharged to[de-identified] Skilled nursing facility  Current DME Used/Available at Home:  (hollis lift at home)    EXAMINATION/PRESENTATION/DECISION MAKING:   Critical Behavior:  Neurologic State: Alert  Orientation Level: Oriented to person, Disoriented to time, Disoriented to situation, Oriented to place  Cognition: Follows commands     Hearing: Auditory  Auditory Impairment: Hard of hearing, bilateral  Skin:    Edema:   Range Of Motion:  AROM: Generally decreased, functional           PROM: Generally decreased, functional           Strength:    Strength: Generally decreased, functional                    Tone & Sensation:   Tone: Normal              Sensation: Intact               Coordination:  Coordination: Generally decreased, functional  Vision:      Functional Mobility:  Bed Mobility:     Supine to Sit: Maximum assistance;Assist x2;Bed Modified  Sit to Supine: Maximum assistance;Assist x2;Bed Modified     Transfers:                             Balance:   Sitting: Impaired; Without support  Sitting - Static: Good (unsupported)  Sitting - Dynamic: Not tested  Ambulation/Gait Training:                                                         Stairs: Therapeutic Exercises:       Functional Measure:  Barthel Index:    Bathin  Bladder: 0  Bowels: 0  Groomin  Dressin  Feedin  Mobility: 0  Stairs: 0  Toilet Use: 0  Transfer (Bed to Chair and Back): 5  Total: 10/100       The Barthel ADL Index: Guidelines  1. The index should be used as a record of what a patient does, not as a record of what a patient could do. 2. The main aim is to establish degree of independence from any help, physical or verbal, however minor and for whatever reason. 3. The need for supervision renders the patient not independent. 4. A patient's performance should be established using the best available evidence. Asking the patient, friends/relatives and nurses are the usual sources, but direct observation and common sense are also important. However direct testing is not needed.   5. Usually the patient's performance over the preceding 24-48 hours is important, but occasionally longer periods will be relevant. 6. Middle categories imply that the patient supplies over 50 per cent of the effort. 7. Use of aids to be independent is allowed. Score Interpretation (from [facility] 83)    Independent   60-79 Minimally independent   40-59 Partially dependent   20-39 Very dependent   <20 Totally dependent     -Juanjo Freitas., BarthelVIVIANA. (1965). Functional evaluation: the Barthel Index. [address] ([address] (1997). The Barthel activities of daily living index: self-reporting versus actual performance in the old (> or = 75 years). Journal of [address] [place] [address] 45(7), [facility], PATRICK, Keara Souza, Linda Bean. (1999). Measuring the change in disability after inpatient rehabilitation; comparison of the responsiveness of the Barthel Index and Functional [place] Measure. Journal of Neurology, Neurosurgery, and Psychiatry, 66(4), 657-094. Ashwin Chawla, N.J.A, ESTELLE Cool, & Larisa East, MLizaA. (2004) Assessment of post-stroke quality of life in cost-effectiveness studies: The usefulness of the Barthel Index and the EuroQoL-5D.  Quality of Life Research, 15, 348-54            Physical Therapy Evaluation Charge Determination   History Examination Presentation Decision-Making   MEDIUM  Complexity : 1-2 comorbidities / personal factors will impact the outcome/ POC  LOW Complexity : 1-2 Standardized tests and measures addressing body structure, function, activity limitation and / or participation in recreation  MEDIUM Complexity : Evolving with changing characteristics  Other outcome measures Barthel  HIGH       Based on the above components, the patient evaluation is determined to be of the following complexity level: MEDIUM    Pain Rating:      Activity Tolerance:   Poor and signs and symptoms of orthostatic hypotension    After treatment patient left in no apparent distress:   Supine in bed, Call bell within reach, and Side rails x 3    COMMUNICATION/EDUCATION:   The patients plan of care was discussed with: Occupational therapist and Registered nurse. Fall prevention education was provided and the patient/caregiver indicated understanding., Patient/family have participated as able in goal setting and plan of care. , and Patient/family agree to work toward stated goals and plan of care.     Thank you for this referral.  Golden Buckner, PT   Time Calculation: 18 mins

## 2022-07-27 NOTE — PROGRESS NOTES
Comprehensive Nutrition Assessment    Type and Reason for Visit: Initial, RD nutrition re-screen/LOS    Nutrition Recommendations/Plan:   Advance diet to 4 carb choice/OLIVIA   Ensure compact TID     Nutrition Assessment:    Patient medically noted for GI bleed and cirrhosis. PMH Stroke, HTN, and DM. Chart reviewed for length of stay. Patient has been receiving a full liquid diet for almost 6 days. GI signed off 7/25; stated \"diet as tolerated. \" Therapy had just entered patient's room to work with her at time of attempted visit. CM working on discharge/placement. Would advance diet as recommended above and encourage intake of meals. Will add supplements with meals for now. Patient Vitals for the past 168 hrs:   % Diet Eaten   07/24/22 1700 1 - 25%   07/24/22 0900 1 - 25%   07/23/22 1215 26 - 50%   07/23/22 0900 1 - 25%     Nutrition Related Findings:    [phone]  3+ edema all extremities   Atorvastatin, Coreg, Celexa, Ferrous sulfate, Lasix, Mag-ox, Protonix, KCl, Carafate          Current Nutrition Intake & Therapies:        ADULT DIET Full Liquid    Anthropometric Measures:  Height: 5' 2\" (157.5 cm)  Ideal Body Weight (IBW): 110 lbs (50 kg)     Current Body Wt:  71.7 kg (158 lb 1.1 oz), 143.7 % IBW. Current BMI (kg/m2): 28.9                          BMI Category: Overweight (BMI 25.0-29. 9)    Estimated Daily Nutrient Needs:  Energy Requirements Based On: Formula  Weight Used for Energy Requirements: Current  Energy (kcal/day): 1467 kcal (BMR 1128 x 1. 3AF)  Weight Used for Protein Requirements: Current  Protein (g/day): 72g (1.0 g/kg bw)  Method Used for Fluid Requirements: 1 ml/kcal  Fluid (ml/day): 1450 mL    Nutrition Diagnosis:   No nutrition diagnosis at this time       Nutrition Interventions:   Food and/or Nutrient Delivery: Modify current diet  Nutrition Education/Counseling: No recommendations at this time  Coordination of Nutrition Care: Continue to monitor while inpatient       Goals: Goals:  (PO intake >50% of meals next 3-5 days)       Nutrition Monitoring and Evaluation:   Behavioral-Environmental Outcomes: None identified  Food/Nutrient Intake Outcomes: Diet advancement/tolerance, Food and nutrient intake  Physical Signs/Symptoms Outcomes: Biochemical data, Weight, GI status    Discharge Planning:     (Consistent carb/2g Na diet)    Siddhartha Gonzalez RD  Contact: ext [phone]

## 2022-07-28 PROBLEM — D64.9 SEVERE ANEMIA: Status: RESOLVED | Noted: 2022-07-20 | Resolved: 2022-07-28

## 2022-07-28 PROBLEM — K92.2 GI BLEED: Status: RESOLVED | Noted: 2022-07-20 | Resolved: 2022-07-28

## 2022-07-28 LAB
BACTERIA SPEC CULT: NORMAL
COVID-19 RAPID TEST, COVR: NOT DETECTED
SERVICE CMNT-IMP: NORMAL
SOURCE, COVRS: NORMAL

## 2022-07-28 PROCEDURE — 94760 N-INVAS EAR/PLS OXIMETRY 1: CPT

## 2022-07-28 PROCEDURE — 87635 SARS-COV-2 COVID-19 AMP PRB: CPT

## 2022-07-28 PROCEDURE — 74011250637 HC RX REV CODE- 250/637: Performed by: INTERNAL MEDICINE

## 2022-07-28 PROCEDURE — 74011250637 HC RX REV CODE- 250/637: Performed by: NURSE PRACTITIONER

## 2022-07-28 PROCEDURE — 65270000046 HC RM TELEMETRY

## 2022-07-28 PROCEDURE — 74011000250 HC RX REV CODE- 250: Performed by: INTERNAL MEDICINE

## 2022-07-28 PROCEDURE — 77010033678 HC OXYGEN DAILY

## 2022-07-28 RX ORDER — FUROSEMIDE 40 MG/1
40 TABLET ORAL EVERY OTHER DAY
Qty: 30 TABLET | Refills: 0 | Status: SHIPPED
Start: 2022-07-28

## 2022-07-28 RX ORDER — PANTOPRAZOLE SODIUM 40 MG/1
40 TABLET, DELAYED RELEASE ORAL
Qty: 30 TABLET | Refills: 0 | Status: SHIPPED
Start: 2022-07-28 | End: 2022-08-27

## 2022-07-28 RX ORDER — SUCRALFATE 1 G/1
1 TABLET ORAL
Qty: 30 TABLET | Refills: 0 | Status: SHIPPED
Start: 2022-07-28

## 2022-07-28 RX ADMIN — CARVEDILOL 3.12 MG: 3.12 TABLET, FILM COATED ORAL at 16:10

## 2022-07-28 RX ADMIN — SODIUM CHLORIDE, PRESERVATIVE FREE 10 ML: 5 INJECTION INTRAVENOUS at 14:00

## 2022-07-28 RX ADMIN — PANTOPRAZOLE SODIUM 40 MG: 40 TABLET, DELAYED RELEASE ORAL at 16:10

## 2022-07-28 RX ADMIN — CARVEDILOL 3.12 MG: 3.12 TABLET, FILM COATED ORAL at 10:17

## 2022-07-28 RX ADMIN — ATORVASTATIN CALCIUM 10 MG: 10 TABLET, FILM COATED ORAL at 21:33

## 2022-07-28 RX ADMIN — SUCRALFATE 1 G: 1 TABLET ORAL at 16:10

## 2022-07-28 RX ADMIN — MAGNESIUM OXIDE TAB 400 MG (241.3 MG ELEMENTAL MG) 400 MG: 400 (241.3 MG) TAB at 10:17

## 2022-07-28 RX ADMIN — FERROUS SULFATE TAB 325 MG (65 MG ELEMENTAL FE) 325 MG: 325 (65 FE) TAB at 10:17

## 2022-07-28 RX ADMIN — SODIUM CHLORIDE, PRESERVATIVE FREE 10 ML: 5 INJECTION INTRAVENOUS at 21:38

## 2022-07-28 RX ADMIN — SODIUM CHLORIDE, PRESERVATIVE FREE 10 ML: 5 INJECTION INTRAVENOUS at 07:19

## 2022-07-28 RX ADMIN — PANTOPRAZOLE SODIUM 40 MG: 40 TABLET, DELAYED RELEASE ORAL at 10:17

## 2022-07-28 RX ADMIN — POTASSIUM CHLORIDE 20 MEQ: 750 TABLET, FILM COATED, EXTENDED RELEASE ORAL at 17:33

## 2022-07-28 RX ADMIN — TRAZODONE HYDROCHLORIDE 25 MG: 50 TABLET ORAL at 21:33

## 2022-07-28 RX ADMIN — CITALOPRAM HYDROBROMIDE 20 MG: 20 TABLET ORAL at 17:33

## 2022-07-28 RX ADMIN — POTASSIUM CHLORIDE 20 MEQ: 750 TABLET, FILM COATED, EXTENDED RELEASE ORAL at 10:17

## 2022-07-28 RX ADMIN — SUCRALFATE 1 G: 1 TABLET ORAL at 21:33

## 2022-07-28 RX ADMIN — SUCRALFATE 1 G: 1 TABLET ORAL at 11:44

## 2022-07-28 NOTE — PROGRESS NOTES
Hospitalist Progress Note    NAME: Emperatriz Holcomb   :  1940   MRN:  [id number]       Assessment / Plan:  Acute UGI bleeding POA-  cont PPI  Acute on chronic anemia -cont monitor hgb >8  Avoid NSAIDs  S/p EGD - Esophagitis at 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 6, uncomplicated ascites in cirrhosis, MELD 24  Cont lasix   40 mg daily    Workup pending for etiology? Of cirrhosis- OP GI followup  Ascitic Fluid Cx neg    DCd Empiric IV Abx         Possible right lower lobe pneumonia POA  -Consolidative process seen at the base of the right lung  S/p IV Zosyn. DCd as cultures remain negative  Blood Cx () neg     Other medical comorbidities  Hypertension  Hyperlipidemia  DM type II  History of CVA without residual deficit     DVT prophylaxis: SCD, chemical prophylaxis contraindicated  Barriers: SNF placement per family    Code status: Full  Prophylaxis: SCD's  Recommended Disposition New SNF as per daughter      Subjective:     Patient was seen and examined. Chart reviewed. No acute issues per staff. SNF placement tomorrow per CM. Objective:     VITALS:   Last 24hrs VS reviewed since prior progress note.  Most recent are:  Patient Vitals for the past 24 hrs:   Temp Pulse Resp BP SpO2   22 0743 97.4 °F (36.3 °C) (!) 53 18 (!) 127/56 90 %   22 97.4 °F (36.3 °C) -- -- -- --   22 1721 97.5 °F (36.4 °C) (!) 52 -- (!) 148/60 98 %       No intake or output data in the 24 hours ending 22 1132       I had a face to face encounter and independently examined this patient on 7/28/2022, as outlined below:  PHYSICAL EXAM:  General: WDWN. EENT:  MMM  Resp:  No accessory muscle use  CV:  RRR,    GI:  Soft    Neurologic:  Alert not much interactive. Psych:    Not anxious nor agitated  Skin:  No rashes.   No jaundice        LABS:  Pertinent labs include:  Recent Labs     07/27/22 0452 07/26/22  0508   WBC 6.2 6.4   HGB 8.3* 8.4*   HCT 25.3* 25.5*   PLT 70* 68*       Recent Labs     07/27/22  0452 07/26/22  0508    146*   K 3.7 3.5   * 112*   CO2 27 28   * 158*   BUN 68* 69*   CREA 2.93* 2.94*   CA 8.0* 8.1*         Current Facility-Administered Medications:     0.9% sodium chloride infusion 250 mL, 250 mL, IntraVENous, PRN, DinTejal MD    potassium chloride SR (KLOR-CON 10) tablet 20 mEq, 20 mEq, Oral, BID, Tanvi Wiseman MD, 20 mEq at 07/28/22 1017    furosemide (LASIX) injection 40 mg, 40 mg, IntraVENous, DAILY, Tanvi Wiseman MD, 40 mg at 07/27/22 0912    pantoprazole (PROTONIX) tablet 40 mg, 40 mg, Oral, ACB&D, Tanvi Rollins MD, 40 mg at 07/28/22 1017    sucralfate (CARAFATE) tablet 1 g - mixed in 10 ml of water to make slurry, 1 g, Oral, AC&HS, Nicole Liang NP, 1 g at 07/27/22 2316    citalopram (CELEXA) tablet 20 mg, 20 mg, Oral, QPM, Raciel Alcantara MD, 20 mg at 07/27/22 1917    traZODone (DESYREL) tablet 25 mg, 25 mg, Oral, QHS, Raciel Alcantara MD, 25 mg at 07/27/22 2316    carvediloL (COREG) tablet 3.125 mg, 3.125 mg, Oral, BID WITH MEALS, Raciel Alcantara MD, 3.125 mg at 07/28/22 1017    atorvastatin (LIPITOR) tablet 10 mg, 10 mg, Oral, QHS, Raciel Alcantara MD, 10 mg at 07/27/22 2316    ferrous sulfate tablet 325 mg, 1 Tablet, Oral, DAILY WITH BREAKFAST, Raciel Alcantara MD, 325 mg at 07/28/22 1017    magnesium oxide (MAG-OX) tablet 400 mg, 400 mg, Oral, DAILY, Raciel Alcantara MD, 400 mg at 07/28/22 1017    sodium chloride (NS) flush 5-40 mL, 5-40 mL, IntraVENous, Q8H, Raciel Alcantara MD, 10 mL at 07/28/22 0719    acetaminophen (TYLENOL) tablet 650 mg, 650 mg, Oral, Q6H PRN, 650 mg at 07/22/22 1754 **OR** acetaminophen (TYLENOL) suppository 650 mg, 650 mg, Rectal, Q6H PRN, Raciel Alcantara MD    polyethylene glycol (MIRALAX) packet 17 g, 17 g, Oral, DAILY PRN, Raciel Alcantara MD    ondansetron (ZOFRAN ODT) tablet 4 mg, 4 mg, Oral, Q8H PRN **OR** ondansetron (ZOFRAN) injection 4 mg, 4 mg, IntraVENous, Q6H PRN, Raciel Alcantara MD, 4 mg at 07/24/22 1700     Signed: Jaylen Preston MD

## 2022-07-28 NOTE — PROGRESS NOTES
Bedside shift report given to SHABBIR RN patient notes no signs of distress during shift change will continue to assess pt     1900  Bedside shift report given to oncbonnie nurse patient stable

## 2022-07-28 NOTE — PROGRESS NOTES
Transition of Care Plan: [address]: 18% (moderate)  Disposition: [address] and Rehab  Follow up appointments:defer to SNF  DME needed:She has a walker at home , [address] and wheelchair. Family in process of building a ramp. Transportation at Discharge: [facility] to [facility] and [facility] room 410, nursing call report to [phone]. [place] or means to access home:   daughter     IM Medicare Letter: 2nd IMM delivered 7/28/2022. Is patient a BCPI-A Bundle:    N/A                  If yes, was Bundle Letter given?:    Is patient a [place] and connected with the [facility]? NO     Caregiver Contact: Aida Chin daughter   Discharge Caregiver contacted prior to discharge? Daughter notified. Care Conference needed?:  daughter        63 - CM spoke with patient's daughter regarding SNF choices, daughter chose [facility], [facility] and [id number] [place] [address]. Referrals to [facility] and [facility] sent via [facility] and [id number] [place] [address] via [employer]. [facility] will be required for transport. Daughter's email is [email or web address]. Patient will need a COVID rapid test prior to discharge. [address] [place] [address] Road [address] appears to have accepted patient, message left for admissions to clarify. [address] with [facility] admissions said bed 410 would be available for patient tomorrow. 1642 - Patient and daughter made aware of discharge plan tomorrow with 10am [facility] to [facility], both agreeable to discharge plan at this time and had no further questions or concerns for CM. 2nd IMM and FOC emailed to daughter as requested at 10 [address] [facility]. [email or web address]. Leave Smarter tool left outside door. Transition of Care Plan to SNF/Rehab    Communication to Patient/Family:  Met with patient and family and they are agreeable to the transition plan.  The Plan for Transition of Care is related to the following treatment goals: PT/OT    The Patient and/or patient representative was provided with a choice of provider and agrees  with the discharge plan. Yes [x] No []    A Freedom of choice list was provided with basic dialogue that supports the patient's individualized plan of care/goals and shares the quality data associated with the providers. Yes [x] No []    SNF/Rehab Transition:  Patient has been accepted to [facility] SN[facility]Rehab and meets criteria for admission. Patient will transported by [facility] and expected to leave at 10 am.    Communication to SNF/Rehab:  Bedside RN has been notified to update the transition plan to the facility and call report (phone number). Discharge information has been updated on the AVS and paper copy to be sent with patient. Nursing Please include all hard scripts for controlled substances, med rec and dc summary, and AVS in packet. Reviewed and confirmed with facility, Karlene, can manage the patient care needs for the following:     Malcolm Peck with (X) only those applicable:  Medication:  [x]Medications are available at the facility  []IV Antibiotics    []Controlled Substance - hard copies available sent. []Weekly Labs    Equipment:  []CPAP/BiPAP  []Wound Vacuum  []M[place]uia or Urinary Device  []PICC/Central Line  []Nebulizer  []Ventilator    Treatment:  []Isolation (for MRSA, VRE, etc.)  []Surgical Drain Management  []Tracheostomy Care  []Dressing Changes  []Dialysis with transportation  []PEG Care  []Oxygen  []Daily Weights for Heart Failure    Dietary:  []Any diet limitations  []Tube Feedings   []Total Parenteral Management (TPN)    Financial Resources:  []Medicaid Application Completed    []UAI Completed and copy given to pt/family  and copy given to pt/family  []A screening has previously been completed. []Level II Completed    [] Private pay individual who will not become   financially eligible for Medicaid within 6 months from admission to a [address] [place] [address] facility.      [] Individual refused to have screening conducted. []Medicaid Application Completed    []The screening denied because it was determined individual did not need/did not qualify for nursing facility level of care. [] Out of state residents seeking direct admission to a [address] facility. [] Individuals who are inpatients of an out of state hospital, or in state or out of state veterans/ hospital and seek direct admission to a [address] facility  [] Individuals who are pateints or residents of a state owned/operated facility that is licensed by Department of [employer] ([facility] and seek direct admission to [address] [place] [address]  [] A screening not required for enrollment in [address] [address] [address] services as set out in [facility] 84-  [] [facility] staff shall perform screenings of the [facility] clients. Advanced Care Plan:  []Surrogate Decision Maker of Care  [x]POA  [x]Communicated Code Status and copy sent.     Other:              BUBBA ColemanN, RN    Care Management  [phone]

## 2022-07-28 NOTE — DISCHARGE INSTRUCTIONS
Diet soft/low salt  Activity slowly increase as tolerated  Cont current wound care  Check cbc/bmp 2-3 days at SNF  Strict fall/aspiration/pressure ulcer precautions  Return to ED or call 911 immediately if symptoms gets worse or re-occur

## 2022-07-28 NOTE — PROGRESS NOTES
Bedside shift change report given to [address] (oncoming nurse) by Cedrick Cabello LPN (offgoing nurse). Report included the following information SBAR, Kardex, and MAR.

## 2022-07-29 VITALS
OXYGEN SATURATION: 92 % | SYSTOLIC BLOOD PRESSURE: 126 MMHG | HEIGHT: 62 IN | HEART RATE: 53 BPM | RESPIRATION RATE: 18 BRPM | DIASTOLIC BLOOD PRESSURE: 81 MMHG | WEIGHT: 183.86 LBS | TEMPERATURE: 98.2 F | BODY MASS INDEX: 33.84 KG/M2

## 2022-07-29 PROCEDURE — 74011250637 HC RX REV CODE- 250/637: Performed by: INTERNAL MEDICINE

## 2022-07-29 PROCEDURE — 94760 N-INVAS EAR/PLS OXIMETRY 1: CPT

## 2022-07-29 PROCEDURE — 74011250637 HC RX REV CODE- 250/637: Performed by: NURSE PRACTITIONER

## 2022-07-29 PROCEDURE — 74011250636 HC RX REV CODE- 250/636: Performed by: INTERNAL MEDICINE

## 2022-07-29 PROCEDURE — 74011000250 HC RX REV CODE- 250: Performed by: INTERNAL MEDICINE

## 2022-07-29 PROCEDURE — 77010033678 HC OXYGEN DAILY

## 2022-07-29 RX ADMIN — CARVEDILOL 3.12 MG: 3.12 TABLET, FILM COATED ORAL at 08:04

## 2022-07-29 RX ADMIN — FUROSEMIDE 40 MG: 10 INJECTION, SOLUTION INTRAMUSCULAR; INTRAVENOUS at 08:04

## 2022-07-29 RX ADMIN — FERROUS SULFATE TAB 325 MG (65 MG ELEMENTAL FE) 325 MG: 325 (65 FE) TAB at 08:04

## 2022-07-29 RX ADMIN — MAGNESIUM OXIDE TAB 400 MG (241.3 MG ELEMENTAL MG) 400 MG: 400 (241.3 MG) TAB at 08:04

## 2022-07-29 RX ADMIN — SODIUM CHLORIDE, PRESERVATIVE FREE 10 ML: 5 INJECTION INTRAVENOUS at 05:38

## 2022-07-29 RX ADMIN — SUCRALFATE 1 G: 1 TABLET ORAL at 08:04

## 2022-07-29 RX ADMIN — POTASSIUM CHLORIDE 20 MEQ: 750 TABLET, FILM COATED, EXTENDED RELEASE ORAL at 08:04

## 2022-07-29 RX ADMIN — PANTOPRAZOLE SODIUM 40 MG: 40 TABLET, DELAYED RELEASE ORAL at 08:04

## 2022-07-29 NOTE — PROGRESS NOTES
Transition of Care Plan: [address]: 18% (moderate)  Disposition: [address] [facility]  Follow up appointments:defer to SNF  DME needed:She has a walker at home , [address] and wheelchair. Family in process of building a ramp. Transportation at Discharge: [facility] [place] [address] to [facility] and [address] [place] [address] room 410, nursing call report to [phone]. [place] or means to access home:   daughter     IM Medicare Letter: 2nd IMM delivered 7/28/2022. Is patient a BCPI-A Bundle:    N/A                  If yes, was Bundle Letter given?:    Is patient a  and connected with the [facility]? NO     Caregiver Contact: Libia Nieves daughter   Discharge Caregiver contacted prior to discharge? Daughter notified. Care Conference needed?:  daughter         [address] notified of patient's discharge at [place] [address].  Daughter aware of discharge and agreeable, no further questions or concerns for CM. Leave Smarter tool left outside door. Transition of Care Plan to SNF/Rehab     Communication to Patient/Family:  Met with patient and family and they are agreeable to the transition plan. The Plan for Transition of Care is related to the following treatment goals: PT/OT     The Patient and/or patient representative was provided with a choice of provider and agrees  with the discharge plan. Yes [x] No []     A Freedom of choice list was provided with basic dialogue that supports the patient's individualized plan of care/goals and shares the quality data associated with the providers. Yes [x] No []     SNF/Rehab Transition:  Patient has been accepted to [facility] SNF/Rehab and meets criteria for admission. Patient will transported by [facility] and expected to leave at 10 am.     Communication to SNF/Rehab:  Bedside RN has been notified to update the transition plan to the facility and call report (phone number).   Discharge information has been updated on the AVS and paper copy to be sent with patient. Nursing Please include all hard scripts for controlled substances, med rec and dc summary, and AVS in packet. Reviewed and confirmed with facility, Karlene, can manage the patient care needs for the following:     Karla Stout with (X) only those applicable:  Medication:  [x]Medications are available at the facility  []IV Antibiotics    []Controlled Substance - hard copies available sent. []Weekly Labs     Equipment:  []CPAP/BiPAP  []Wound Vacuum  [][place]uia or Urinary Device  []PICC/Central Line  []Nebulizer  []Ventilator     Treatment:  []Isolation (for MRSA, VRE, etc.)  []Surgical Drain Management  []Tracheostomy Care  []Dressing Changes  []Dialysis with transportation  []PEG Care  []Oxygen  []Daily Weights for Heart Failure     Dietary:  []Any diet limitations  []Tube Feedings  []Total Parenteral Management (TPN)     Financial Resources:  []Medicaid Application Completed     []UAI Completed and copy given to pt/family  and copy given to pt/family  []A screening has previously been completed. []Level II Completed     [] Private pay individual who will not become  financially eligible for Medicaid within 6 months from admission to a [address] [place] [address]. [] Individual refused to have screening conducted. []Medicaid Application Completed     []The screening denied because it was determined individual did not need/did not qualify for nursing facility level of care. [] Out of state residents seeking direct admission to a [address] facility.   [] Individuals who are inpatients of an out of state hospital, or in state or out of state veterans/ hospital and seek direct admission to a [address] facility  [] Individuals who are pateints or residents of a state owned/operated facility that is licensed by Related Content Database (RCDb) of [employer]) and seek direct admission to [address] [place] [address]  [] A screening not required for enrollment in [facility] services as set out in [address] [place] [address] 30-  [] [facility]) staff shall perform screenings of the [facility] clients. Advanced Care Plan:  []Surrogate Decision Maker of Care  [x]POA  [x]Communicated Code Status and copy sent.      Other:                  BUBBA HurdN, RN     Care Management  [phone]

## 2022-07-29 NOTE — DISCHARGE SUMMARY
Hospitalist Discharge Summary     Patient ID:  Curtis Marrufo  [id number]  37 y.o.  1940 7/20/2022    PCP on record: Antoinette Rasmussen MD    Admit date: 7/20/2022  Discharge date and time: 7/29/2022    DISCHARGE DIAGNOSIS:  UGI bleed  Anemia  JAE  CKD stg 4  Cirrhosis of liver  HTN  DMT2    CONSULTATIONS:  IP CONSULT TO GASTROENTEROLOGY    Excerpted HPI from H&P of Ericka Link MD:  12-year-old female with past medical history including hypertension, hyperlipidemia, diabetes type 2, history of CVA, history of GI bleed. Patient is referred from outside facility for further management of GI bleed. Earlier today she started experiencing nausea, vomiting and had melanotic stool. At the referring facility FOBT was positive, hemoglobin was 6.5 mg/dL. Patient was given a unit of PRBC. CT of the abdomen showed bilateral pleural effusion and consolidative opacity in the right lower lobe. There is large volume abdominal pelvic ascites and diffuse body wall edema. Of note patient was admitted in May for similar EGD showed bleeding antral gastritis which was cauterized. Evaluated patient at bedside, she is alert and awake however does not participate in a meaningful history. Does not appear to be in pain or in acute distress. However she appears to be edematous. ______________________________________________________________________  DISCHARGE SUMMARY/HOSPITAL COURSE:  for full details see H&P, daily progress notes, labs, consult notes.      Acute UGI bleeding POA-  cont PPI  Acute on chronic anemia -cont monitor hgb >8  Avoid NSAIDs  S/p EGD 7/21- Esophagitis at 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 6, uncomplicated ascites in cirrhosis, MELD 24  Cont lasix   40 mg daily    Workup pending for etiology? Of cirrhosis- OP GI followup  Ascitic Fluid Cx neg    DCd Empiric IV Abx           Possible right lower lobe pneumonia POA  -Consolidative process seen at the base of the right lung  S/p IV Zosyn. DCd as cultures remain negative  Blood Cx (7/22) neg     Other medical comorbidities  Hypertension  Hyperlipidemia  DM type II  History of CVA without residual deficit     DVT prophylaxis: SCD, chemical prophylaxis contraindicated       Code status: Full  Prophylaxis: SCD's  Recommended Disposition  [facility]          ___________________________________________________________________  Patient seen and examined by me on discharge day maximized inpatient stay and ready to go to SNF today. No other acute issues reported to me  by staff at this time. _______________________________________________________________________  DISCHARGE MEDICATIONS:   Current Discharge Medication List        START taking these medications    Details   pantoprazole (PROTONIX) 40 mg tablet Take 1 Tablet by mouth Before breakfast and dinner for 30 days. Qty: 30 Tablet, Refills: 0  Start date: 7/28/2022, End date: 8/27/2022      sucralfate (CARAFATE) 1 gram tablet Take 1 Tablet by mouth Before breakfast, lunch, dinner and at bedtime. Qty: 30 Tablet, Refills: 0  Start date: 7/28/2022      furosemide (Lasix) 40 mg tablet Take 1 Tablet by mouth every other day. Qty: 30 Tablet, Refills: 0  Start date: 7/28/2022           CONTINUE these medications which have NOT CHANGED    Details   folic acid (FOLVITE) 007 mcg tablet Take 800 mcg by mouth daily. magnesium 250 mg tab Take 250 mg by mouth daily (after dinner). multivitamin (ONE A DAY) tablet Take 1 Tab by mouth daily.       carvediloL (Coreg) 3.125 mg tablet Take 3.125 mg by mouth two (2) times daily (with meals). meclizine (ANTIVERT) 12.5 mg tablet Take 6.25 mg by mouth three (3) times daily as needed. ascorbic acid, vitamin C, (VITAMIN C) 500 mg tablet Take 500 mg by mouth daily. ferrous sulfate (IRON) 325 mg (65 mg iron) EC tablet Take 325 mg by mouth Daily (before breakfast). traZODone (DESYREL) 50 mg tablet Take 25 mg by mouth nightly. simvastatin (ZOCOR) 20 mg tablet Take 20 mg by mouth nightly. fenofibrate nanocrystallized (TRICOR) 145 mg tablet Take 145 mg by mouth nightly. citalopram (CELEXA) 20 mg tablet Take 20 mg by mouth every evening.            STOP taking these medications       pentoxifylline CR (TRENTAL) 400 mg CR tablet Comments:   Reason for Stopping:                 Patient Follow Up Instructions:     Diet soft/low salt  Activity slowly increase as tolerated  Cont current wound care  Check cbc/bmp 2-3 days at [facility]  Strict fall/aspiration/pressure ulcer precautions  Return to ED or call 911 immediately if symptoms gets worse or re-occur  Follow-up Information       Follow up With Specialties Details Why Cristino POSADAS [address]  Sampson Graham 53    Mary Kay Graves MD Gastroenterology Follow up in 2 week(s) for repeat EGD in 4-6 weeks [address] [facility] [address]  [facility]  [facility]      Berenice Umanzor MD Nephrology Follow up in 1 week(s) for kidney issues [address] [facility]  [phone]      Mahi Murray MD Hepatology Follow up in 4 week(s) for liver issues [facility]  [address] Riley Weiner MD Wound Care Follow up in 1 week(s) post hospital discharge f/u Blanche Mayen [address]  [phone] ________________________________________________________________    Risk of deterioration: High due to  age and multiple co-morbidirties    Condition at Discharge:  Stable  __________________________________________________________________    Disposition  SNF/LTC    ___________________________________________________________________    Code Status: Full Code  ___________________________________________________________________      Total time in minutes spent coordinating this discharge  36  minutes    Signed:  MD Liza Sin

## (undated) DEVICE — NEEDLE HYPO 18GA L1.5IN PNK S STL HUB POLYPR SHLD REG BVL

## (undated) DEVICE — STERILE POLYISOPRENE POWDER-FREE SURGICAL GLOVES: Brand: PROTEXIS

## (undated) DEVICE — SET ADMIN 16ML TBNG L100IN 2 Y INJ SITE IV PIGGY BK DISP

## (undated) DEVICE — CONTAINER SPEC 20 ML LID NEUT BUFF FORMALIN 10 % POLYPR STS

## (undated) DEVICE — TOWEL SURG W17XL27IN STD BLU COT NONFENESTRATED PREWASHED

## (undated) DEVICE — ADULT SPO2 SENSOR: Brand: NELLCOR

## (undated) DEVICE — SOLIDIFIER FLD 2OZ 1500CC N DISINF IN BTL DISP SAFESORB

## (undated) DEVICE — NEEDLE SPNL L4.75IN OD25GA QNCKE TYP SPINOCAN

## (undated) DEVICE — SYR 5ML 1/5 GRAD LL NSAF LF --

## (undated) DEVICE — FORCEPS BX L160CM DIA8MM GRSP DISECT CUP TIP NONLOCKING ROT

## (undated) DEVICE — HYPODERMIC SAFETY NEEDLE: Brand: MAGELLAN

## (undated) DEVICE — PREP SKN CHLRAPRP APL 26ML STR --

## (undated) DEVICE — NEEDLE HYPO 25GA L1.5IN BVL ORIENTED ECLIPSE

## (undated) DEVICE — FIAPC® PROBE W/ FILTER 2200 A OD 2.3MM/6.9FR; L 2.2M/7.2FT: Brand: ERBE

## (undated) DEVICE — SYR 3ML LL TIP 1/10ML GRAD --

## (undated) DEVICE — POLYLINED TOWEL: Brand: CONVERTORS

## (undated) DEVICE — TOWEL 4 PLY TISS 19X30 SUE WHT

## (undated) DEVICE — (D)PREP SKN CHLRAPRP APPL 26ML -- CONVERT TO ITEM 371833

## (undated) DEVICE — Z DISCONTINUED PER MEDLINE LINE GAS SAMPLING O2/CO2 LNG AD 13 FT NSL W/ TBNG FILTERLINE

## (undated) DEVICE — Device

## (undated) DEVICE — MARKER,SKIN,WI/RULER AND LABELS: Brand: MEDLINE

## (undated) DEVICE — BASIN EMSIS 16OZ GRAPHITE PLAS KID SHP MOLD GRAD FOR ORAL

## (undated) DEVICE — SYR 10ML LUER LOK 1/5ML GRAD --

## (undated) DEVICE — 1200 GUARD II KIT W/5MM TUBE W/O VAC TUBE: Brand: GUARDIAN

## (undated) DEVICE — SET IV EXTN L7IN 05ML PRIMING STD BOR MAXZERO CONN PINCH

## (undated) DEVICE — NEONATAL-ADULT SPO2 SENSOR: Brand: NELLCOR

## (undated) DEVICE — BAG SPEC BIOHZRD 10 X 10 IN --

## (undated) DEVICE — 7" (18 CM) APPX 0.45 ML, PRESSURE INFUSION (400PSIG) EXT SET W/ MICROCLAVE™ CLEAR, PURPLE CLAMP, ROTATING LUER: Brand: ICU MEDICAL

## (undated) DEVICE — YANKAUER,TAPERED BULBOUS TIP,W/O VENT: Brand: MEDLINE

## (undated) DEVICE — CATH IV AUTOGRD BC PNK 20GA 25 -- INSYTE

## (undated) DEVICE — SKIN MARKER,REGULAR TIP WITH RULER AND LABELS: Brand: DEVON

## (undated) DEVICE — SET EXTN PRIMING 0.59ML 8.5IN 1.55LB PRSS RATE MINIBOR PUR

## (undated) DEVICE — ELECTRODE,RADIOTRANSLUCENT,FOAM,5PK: Brand: MEDLINE

## (undated) DEVICE — (D)SYR 10ML 1/5ML GRAD NSAF -- PKGING CHANGE USE ITEM 338027

## (undated) DEVICE — BLOCK BITE ENDOSCP AD 21 MM W/ DIL BLU LF DISP

## (undated) DEVICE — REM POLYHESIVE ADULT PATIENT RETURN ELECTRODE: Brand: VALLEYLAB

## (undated) DEVICE — ENDOSCOPY PUMP TUBING/ CAP SET: Brand: ERBE